# Patient Record
Sex: MALE | Race: BLACK OR AFRICAN AMERICAN | NOT HISPANIC OR LATINO
[De-identification: names, ages, dates, MRNs, and addresses within clinical notes are randomized per-mention and may not be internally consistent; named-entity substitution may affect disease eponyms.]

---

## 2017-02-03 ENCOUNTER — APPOINTMENT (OUTPATIENT)
Dept: PODIATRY | Facility: CLINIC | Age: 47
End: 2017-02-03

## 2017-02-03 PROBLEM — Z00.00 ENCOUNTER FOR PREVENTIVE HEALTH EXAMINATION: Status: ACTIVE | Noted: 2017-02-03

## 2017-03-07 ENCOUNTER — APPOINTMENT (OUTPATIENT)
Dept: PODIATRY | Facility: CLINIC | Age: 47
End: 2017-03-07

## 2017-03-07 VITALS — HEIGHT: 77 IN | WEIGHT: 225 LBS | BODY MASS INDEX: 26.57 KG/M2

## 2017-03-23 ENCOUNTER — APPOINTMENT (OUTPATIENT)
Dept: PODIATRY | Facility: CLINIC | Age: 47
End: 2017-03-23

## 2017-03-23 VITALS — HEIGHT: 77 IN | WEIGHT: 130 LBS | BODY MASS INDEX: 15.35 KG/M2

## 2017-03-23 VITALS — HEART RATE: 72 BPM | DIASTOLIC BLOOD PRESSURE: 72 MMHG | SYSTOLIC BLOOD PRESSURE: 150 MMHG

## 2017-03-29 ENCOUNTER — APPOINTMENT (OUTPATIENT)
Dept: PODIATRY | Facility: CLINIC | Age: 47
End: 2017-03-29

## 2017-03-29 VITALS
BODY MASS INDEX: 27.75 KG/M2 | HEIGHT: 77 IN | HEART RATE: 88 BPM | SYSTOLIC BLOOD PRESSURE: 140 MMHG | DIASTOLIC BLOOD PRESSURE: 70 MMHG | WEIGHT: 235 LBS

## 2017-04-27 ENCOUNTER — APPOINTMENT (OUTPATIENT)
Dept: PODIATRY | Facility: CLINIC | Age: 47
End: 2017-04-27

## 2017-04-27 VITALS
HEART RATE: 78 BPM | DIASTOLIC BLOOD PRESSURE: 80 MMHG | WEIGHT: 230 LBS | SYSTOLIC BLOOD PRESSURE: 120 MMHG | HEIGHT: 77 IN | BODY MASS INDEX: 27.16 KG/M2

## 2017-05-04 ENCOUNTER — APPOINTMENT (OUTPATIENT)
Dept: PODIATRY | Facility: CLINIC | Age: 47
End: 2017-05-04

## 2017-05-04 VITALS
DIASTOLIC BLOOD PRESSURE: 80 MMHG | HEIGHT: 77 IN | BODY MASS INDEX: 27.16 KG/M2 | HEART RATE: 78 BPM | WEIGHT: 230 LBS | SYSTOLIC BLOOD PRESSURE: 128 MMHG

## 2017-05-11 ENCOUNTER — APPOINTMENT (OUTPATIENT)
Dept: PODIATRY | Facility: CLINIC | Age: 47
End: 2017-05-11

## 2017-05-18 ENCOUNTER — APPOINTMENT (OUTPATIENT)
Dept: PODIATRY | Facility: CLINIC | Age: 47
End: 2017-05-18

## 2017-05-18 VITALS
BODY MASS INDEX: 27.75 KG/M2 | SYSTOLIC BLOOD PRESSURE: 132 MMHG | DIASTOLIC BLOOD PRESSURE: 64 MMHG | HEART RATE: 70 BPM | WEIGHT: 235 LBS | HEIGHT: 77 IN

## 2017-05-25 ENCOUNTER — APPOINTMENT (OUTPATIENT)
Dept: PODIATRY | Facility: CLINIC | Age: 47
End: 2017-05-25

## 2017-06-01 ENCOUNTER — APPOINTMENT (OUTPATIENT)
Dept: PODIATRY | Facility: CLINIC | Age: 47
End: 2017-06-01

## 2017-06-08 ENCOUNTER — APPOINTMENT (OUTPATIENT)
Dept: PODIATRY | Facility: CLINIC | Age: 47
End: 2017-06-08

## 2017-06-15 ENCOUNTER — OUTPATIENT (OUTPATIENT)
Dept: OUTPATIENT SERVICES | Facility: HOSPITAL | Age: 47
LOS: 1 days | Discharge: HOME | End: 2017-06-15

## 2017-06-15 ENCOUNTER — APPOINTMENT (OUTPATIENT)
Dept: PODIATRY | Facility: CLINIC | Age: 47
End: 2017-06-15

## 2017-06-15 VITALS
BODY MASS INDEX: 27.16 KG/M2 | HEART RATE: 80 BPM | DIASTOLIC BLOOD PRESSURE: 84 MMHG | HEIGHT: 77 IN | WEIGHT: 230 LBS | SYSTOLIC BLOOD PRESSURE: 122 MMHG

## 2017-06-15 DIAGNOSIS — K80.21 CALCULUS OF GALLBLADDER WITHOUT CHOLECYSTITIS WITH OBSTRUCTION: ICD-10-CM

## 2017-06-22 ENCOUNTER — APPOINTMENT (OUTPATIENT)
Dept: PODIATRY | Facility: CLINIC | Age: 47
End: 2017-06-22

## 2017-06-22 ENCOUNTER — OUTPATIENT (OUTPATIENT)
Dept: OUTPATIENT SERVICES | Facility: HOSPITAL | Age: 47
LOS: 1 days | Discharge: HOME | End: 2017-06-22

## 2017-06-22 DIAGNOSIS — K80.21 CALCULUS OF GALLBLADDER WITHOUT CHOLECYSTITIS WITH OBSTRUCTION: ICD-10-CM

## 2017-07-06 ENCOUNTER — OUTPATIENT (OUTPATIENT)
Dept: OUTPATIENT SERVICES | Facility: HOSPITAL | Age: 47
LOS: 1 days | Discharge: HOME | End: 2017-07-06

## 2017-07-06 ENCOUNTER — APPOINTMENT (OUTPATIENT)
Dept: PODIATRY | Facility: CLINIC | Age: 47
End: 2017-07-06

## 2017-07-06 DIAGNOSIS — K80.21 CALCULUS OF GALLBLADDER WITHOUT CHOLECYSTITIS WITH OBSTRUCTION: ICD-10-CM

## 2017-07-12 DIAGNOSIS — E11.8 TYPE 2 DIABETES MELLITUS WITH UNSPECIFIED COMPLICATIONS: ICD-10-CM

## 2017-07-12 DIAGNOSIS — M79.671 PAIN IN RIGHT FOOT: ICD-10-CM

## 2017-07-12 DIAGNOSIS — L97.519 NON-PRESSURE CHRONIC ULCER OF OTHER PART OF RIGHT FOOT WITH UNSPECIFIED SEVERITY: ICD-10-CM

## 2017-07-20 ENCOUNTER — APPOINTMENT (OUTPATIENT)
Dept: PODIATRY | Facility: CLINIC | Age: 47
End: 2017-07-20

## 2017-07-20 ENCOUNTER — OUTPATIENT (OUTPATIENT)
Dept: OUTPATIENT SERVICES | Facility: HOSPITAL | Age: 47
LOS: 1 days | Discharge: HOME | End: 2017-07-20

## 2017-07-20 DIAGNOSIS — K80.21 CALCULUS OF GALLBLADDER WITHOUT CHOLECYSTITIS WITH OBSTRUCTION: ICD-10-CM

## 2017-07-31 DIAGNOSIS — M79.671 PAIN IN RIGHT FOOT: ICD-10-CM

## 2017-07-31 DIAGNOSIS — L97.519 NON-PRESSURE CHRONIC ULCER OF OTHER PART OF RIGHT FOOT WITH UNSPECIFIED SEVERITY: ICD-10-CM

## 2017-07-31 DIAGNOSIS — E11.8 TYPE 2 DIABETES MELLITUS WITH UNSPECIFIED COMPLICATIONS: ICD-10-CM

## 2017-08-03 ENCOUNTER — APPOINTMENT (OUTPATIENT)
Dept: PODIATRY | Facility: CLINIC | Age: 47
End: 2017-08-03

## 2017-08-03 ENCOUNTER — OUTPATIENT (OUTPATIENT)
Dept: OUTPATIENT SERVICES | Facility: HOSPITAL | Age: 47
LOS: 1 days | Discharge: HOME | End: 2017-08-03

## 2017-08-03 DIAGNOSIS — K80.21 CALCULUS OF GALLBLADDER WITHOUT CHOLECYSTITIS WITH OBSTRUCTION: ICD-10-CM

## 2017-08-10 ENCOUNTER — APPOINTMENT (OUTPATIENT)
Dept: PODIATRY | Facility: CLINIC | Age: 47
End: 2017-08-10

## 2017-08-10 ENCOUNTER — OUTPATIENT (OUTPATIENT)
Dept: OUTPATIENT SERVICES | Facility: HOSPITAL | Age: 47
LOS: 1 days | Discharge: HOME | End: 2017-08-10

## 2017-08-10 DIAGNOSIS — K80.21 CALCULUS OF GALLBLADDER WITHOUT CHOLECYSTITIS WITH OBSTRUCTION: ICD-10-CM

## 2017-08-11 DIAGNOSIS — M79.671 PAIN IN RIGHT FOOT: ICD-10-CM

## 2017-08-11 DIAGNOSIS — E11.8 TYPE 2 DIABETES MELLITUS WITH UNSPECIFIED COMPLICATIONS: ICD-10-CM

## 2017-08-11 DIAGNOSIS — L97.519 NON-PRESSURE CHRONIC ULCER OF OTHER PART OF RIGHT FOOT WITH UNSPECIFIED SEVERITY: ICD-10-CM

## 2017-08-24 ENCOUNTER — OUTPATIENT (OUTPATIENT)
Dept: OUTPATIENT SERVICES | Facility: HOSPITAL | Age: 47
LOS: 1 days | Discharge: HOME | End: 2017-08-24

## 2017-08-24 ENCOUNTER — APPOINTMENT (OUTPATIENT)
Dept: PODIATRY | Facility: CLINIC | Age: 47
End: 2017-08-24

## 2017-08-24 DIAGNOSIS — K80.21 CALCULUS OF GALLBLADDER WITHOUT CHOLECYSTITIS WITH OBSTRUCTION: ICD-10-CM

## 2017-08-25 DIAGNOSIS — M79.671 PAIN IN RIGHT FOOT: ICD-10-CM

## 2017-08-25 DIAGNOSIS — E11.8 TYPE 2 DIABETES MELLITUS WITH UNSPECIFIED COMPLICATIONS: ICD-10-CM

## 2017-08-25 DIAGNOSIS — L97.519 NON-PRESSURE CHRONIC ULCER OF OTHER PART OF RIGHT FOOT WITH UNSPECIFIED SEVERITY: ICD-10-CM

## 2017-09-07 ENCOUNTER — OUTPATIENT (OUTPATIENT)
Dept: OUTPATIENT SERVICES | Facility: HOSPITAL | Age: 47
LOS: 1 days | Discharge: HOME | End: 2017-09-07

## 2017-09-07 ENCOUNTER — APPOINTMENT (OUTPATIENT)
Dept: PODIATRY | Facility: CLINIC | Age: 47
End: 2017-09-07

## 2017-09-07 DIAGNOSIS — K80.21 CALCULUS OF GALLBLADDER WITHOUT CHOLECYSTITIS WITH OBSTRUCTION: ICD-10-CM

## 2017-09-21 ENCOUNTER — OUTPATIENT (OUTPATIENT)
Dept: OUTPATIENT SERVICES | Facility: HOSPITAL | Age: 47
LOS: 1 days | Discharge: HOME | End: 2017-09-21

## 2017-09-21 ENCOUNTER — APPOINTMENT (OUTPATIENT)
Dept: PODIATRY | Facility: CLINIC | Age: 47
End: 2017-09-21

## 2017-09-21 DIAGNOSIS — K80.21 CALCULUS OF GALLBLADDER WITHOUT CHOLECYSTITIS WITH OBSTRUCTION: ICD-10-CM

## 2017-09-28 ENCOUNTER — APPOINTMENT (OUTPATIENT)
Dept: PODIATRY | Facility: CLINIC | Age: 47
End: 2017-09-28

## 2017-12-21 ENCOUNTER — APPOINTMENT (OUTPATIENT)
Dept: PODIATRY | Facility: CLINIC | Age: 47
End: 2017-12-21

## 2017-12-21 ENCOUNTER — OUTPATIENT (OUTPATIENT)
Dept: OUTPATIENT SERVICES | Facility: HOSPITAL | Age: 47
LOS: 1 days | Discharge: HOME | End: 2017-12-21

## 2017-12-21 VITALS
DIASTOLIC BLOOD PRESSURE: 80 MMHG | WEIGHT: 220 LBS | SYSTOLIC BLOOD PRESSURE: 134 MMHG | HEART RATE: 82 BPM | HEIGHT: 77 IN | BODY MASS INDEX: 25.98 KG/M2

## 2017-12-21 DIAGNOSIS — K80.21 CALCULUS OF GALLBLADDER WITHOUT CHOLECYSTITIS WITH OBSTRUCTION: ICD-10-CM

## 2017-12-28 ENCOUNTER — APPOINTMENT (OUTPATIENT)
Dept: PODIATRY | Facility: CLINIC | Age: 47
End: 2017-12-28

## 2017-12-29 ENCOUNTER — OUTPATIENT (OUTPATIENT)
Dept: OUTPATIENT SERVICES | Facility: HOSPITAL | Age: 47
LOS: 1 days | Discharge: HOME | End: 2017-12-29

## 2017-12-29 DIAGNOSIS — K80.21 CALCULUS OF GALLBLADDER WITHOUT CHOLECYSTITIS WITH OBSTRUCTION: ICD-10-CM

## 2018-04-19 ENCOUNTER — OUTPATIENT (OUTPATIENT)
Dept: OUTPATIENT SERVICES | Facility: HOSPITAL | Age: 48
LOS: 1 days | Discharge: HOME | End: 2018-04-19

## 2018-04-19 ENCOUNTER — APPOINTMENT (OUTPATIENT)
Dept: PODIATRY | Facility: CLINIC | Age: 48
End: 2018-04-19
Payer: MEDICAID

## 2018-04-19 VITALS — HEART RATE: 94 BPM | SYSTOLIC BLOOD PRESSURE: 123 MMHG | DIASTOLIC BLOOD PRESSURE: 85 MMHG

## 2018-04-19 VITALS — WEIGHT: 220 LBS | HEIGHT: 77 IN | BODY MASS INDEX: 25.98 KG/M2

## 2018-04-19 PROCEDURE — 97597 DBRDMT OPN WND 1ST 20 CM/<: CPT

## 2018-04-19 PROCEDURE — 99212 OFFICE O/P EST SF 10 MIN: CPT | Mod: 25

## 2018-04-20 RX ORDER — METFORMIN HYDROCHLORIDE 1000 MG/1
1000 TABLET, COATED ORAL
Refills: 0 | Status: COMPLETED | COMMUNITY

## 2018-04-25 ENCOUNTER — OUTPATIENT (OUTPATIENT)
Dept: OUTPATIENT SERVICES | Facility: HOSPITAL | Age: 48
LOS: 1 days | Discharge: HOME | End: 2018-04-25

## 2018-04-25 ENCOUNTER — APPOINTMENT (OUTPATIENT)
Dept: PODIATRY | Facility: CLINIC | Age: 48
End: 2018-04-25
Payer: MEDICAID

## 2018-04-25 VITALS
BODY MASS INDEX: 26.33 KG/M2 | HEART RATE: 98 BPM | SYSTOLIC BLOOD PRESSURE: 124 MMHG | DIASTOLIC BLOOD PRESSURE: 89 MMHG | WEIGHT: 223 LBS | HEIGHT: 77 IN

## 2018-04-25 DIAGNOSIS — L97.519 NON-PRESSURE CHRONIC ULCER OF OTHER PART OF RIGHT FOOT WITH UNSPECIFIED SEVERITY: ICD-10-CM

## 2018-04-25 PROCEDURE — 11042 DBRDMT SUBQ TIS 1ST 20SQCM/<: CPT

## 2018-04-30 ENCOUNTER — INPATIENT (INPATIENT)
Facility: HOSPITAL | Age: 48
LOS: 3 days | Discharge: HOME HEALTH | End: 2018-05-04
Attending: INTERNAL MEDICINE | Admitting: INTERNAL MEDICINE
Payer: MEDICAID

## 2018-04-30 VITALS
HEART RATE: 89 BPM | TEMPERATURE: 96 F | SYSTOLIC BLOOD PRESSURE: 184 MMHG | OXYGEN SATURATION: 98 % | RESPIRATION RATE: 16 BRPM | DIASTOLIC BLOOD PRESSURE: 93 MMHG

## 2018-04-30 NOTE — ED PROVIDER NOTE - CARE PLAN
Principal Discharge DX:	Diabetic foot ulcer associated with diabetes mellitus due to underlying condition  Secondary Diagnosis:	Toe dislocation, right, sequela

## 2018-04-30 NOTE — ED PROVIDER NOTE - PROGRESS NOTE DETAILS
Dr. Carl Podiatry at bedside. Dr. Carl Podiatry at bedside recommends IV antibiotics and admission. Case signed out to TAI Cowart.

## 2018-04-30 NOTE — ED PROVIDER NOTE - ATTENDING CONTRIBUTION TO CARE
Patient with history of DM II, chronic foot/toe ulceration, here for atraumatic dislocation of PIP of 2nd toe with proximal phalynx protruding through ulcer. Will treat as open fracture, podiatry, IV abx and admission.

## 2018-04-30 NOTE — ED PROVIDER NOTE - OBJECTIVE STATEMENT
46 y/o male with hx DM, Foot ulcer presents to the ED c/o "I was at work all day standing and when I went home I noticed my right 2nd toe was deformed and I think it's broken." no hx trauma/ fever/ chills/ weakness. Patient states was been under care at Podiatry clinic.

## 2018-05-01 LAB
ANION GAP SERPL CALC-SCNC: 11 MMOL/L — SIGNIFICANT CHANGE UP (ref 7–14)
ANION GAP SERPL CALC-SCNC: 11 MMOL/L — SIGNIFICANT CHANGE UP (ref 7–14)
BUN SERPL-MCNC: 19 MG/DL — SIGNIFICANT CHANGE UP (ref 10–20)
BUN SERPL-MCNC: 22 MG/DL — HIGH (ref 10–20)
CALCIUM SERPL-MCNC: 9 MG/DL — SIGNIFICANT CHANGE UP (ref 8.5–10.1)
CALCIUM SERPL-MCNC: 9.7 MG/DL — SIGNIFICANT CHANGE UP (ref 8.5–10.1)
CHLORIDE SERPL-SCNC: 101 MMOL/L — SIGNIFICANT CHANGE UP (ref 98–110)
CHLORIDE SERPL-SCNC: 99 MMOL/L — SIGNIFICANT CHANGE UP (ref 98–110)
CO2 SERPL-SCNC: 29 MMOL/L — SIGNIFICANT CHANGE UP (ref 17–32)
CO2 SERPL-SCNC: 29 MMOL/L — SIGNIFICANT CHANGE UP (ref 17–32)
CREAT SERPL-MCNC: 1 MG/DL — SIGNIFICANT CHANGE UP (ref 0.7–1.5)
CREAT SERPL-MCNC: 1.1 MG/DL — SIGNIFICANT CHANGE UP (ref 0.7–1.5)
ESTIMATED AVERAGE GLUCOSE: 192 MG/DL — HIGH (ref 68–114)
ESTIMATED AVERAGE GLUCOSE: 194 MG/DL — HIGH (ref 68–114)
GLUCOSE SERPL-MCNC: 169 MG/DL — HIGH (ref 70–99)
GLUCOSE SERPL-MCNC: 200 MG/DL — HIGH (ref 70–99)
HBA1C BLD-MCNC: 8.3 % — HIGH (ref 4–5.6)
HBA1C BLD-MCNC: 8.4 % — HIGH (ref 4–5.6)
HCT VFR BLD CALC: 39.6 % — LOW (ref 42–52)
HGB BLD-MCNC: 12.1 G/DL — LOW (ref 14–18)
INR BLD: 1.12 RATIO — SIGNIFICANT CHANGE UP (ref 0.65–1.3)
LACTATE SERPL-SCNC: 1.2 MMOL/L — SIGNIFICANT CHANGE UP (ref 0.5–2.2)
MCHC RBC-ENTMCNC: 23 PG — LOW (ref 27–31)
MCHC RBC-ENTMCNC: 30.6 G/DL — LOW (ref 32–37)
MCV RBC AUTO: 75.4 FL — LOW (ref 80–94)
NRBC # BLD: 0 /100 WBCS — SIGNIFICANT CHANGE UP (ref 0–0)
PLATELET # BLD AUTO: 238 K/UL — SIGNIFICANT CHANGE UP (ref 130–400)
POTASSIUM SERPL-MCNC: 4.1 MMOL/L — SIGNIFICANT CHANGE UP (ref 3.5–5)
POTASSIUM SERPL-MCNC: 4.2 MMOL/L — SIGNIFICANT CHANGE UP (ref 3.5–5)
POTASSIUM SERPL-SCNC: 4.1 MMOL/L — SIGNIFICANT CHANGE UP (ref 3.5–5)
POTASSIUM SERPL-SCNC: 4.2 MMOL/L — SIGNIFICANT CHANGE UP (ref 3.5–5)
PROTHROM AB SERPL-ACNC: 12.1 SEC — SIGNIFICANT CHANGE UP (ref 9.95–12.87)
RBC # BLD: 5.25 M/UL — SIGNIFICANT CHANGE UP (ref 4.7–6.1)
RBC # FLD: 12.6 % — SIGNIFICANT CHANGE UP (ref 11.5–14.5)
SODIUM SERPL-SCNC: 139 MMOL/L — SIGNIFICANT CHANGE UP (ref 135–146)
SODIUM SERPL-SCNC: 141 MMOL/L — SIGNIFICANT CHANGE UP (ref 135–146)
WBC # BLD: 6.08 K/UL — SIGNIFICANT CHANGE UP (ref 4.8–10.8)
WBC # FLD AUTO: 6.08 K/UL — SIGNIFICANT CHANGE UP (ref 4.8–10.8)

## 2018-05-01 RX ORDER — ACETAMINOPHEN 500 MG
650 TABLET ORAL EVERY 6 HOURS
Qty: 0 | Refills: 0 | Status: DISCONTINUED | OUTPATIENT
Start: 2018-05-01 | End: 2018-05-04

## 2018-05-01 RX ORDER — SODIUM CHLORIDE 9 MG/ML
3 INJECTION INTRAMUSCULAR; INTRAVENOUS; SUBCUTANEOUS EVERY 8 HOURS
Qty: 0 | Refills: 0 | Status: DISCONTINUED | OUTPATIENT
Start: 2018-05-01 | End: 2018-05-04

## 2018-05-01 RX ORDER — VANCOMYCIN HCL 1 G
1500 VIAL (EA) INTRAVENOUS EVERY 8 HOURS
Qty: 0 | Refills: 0 | Status: DISCONTINUED | OUTPATIENT
Start: 2018-05-01 | End: 2018-05-03

## 2018-05-01 RX ORDER — VANCOMYCIN HCL 1 G
1000 VIAL (EA) INTRAVENOUS ONCE
Qty: 0 | Refills: 0 | Status: COMPLETED | OUTPATIENT
Start: 2018-05-01 | End: 2018-05-01

## 2018-05-01 RX ORDER — LANOLIN ALCOHOL/MO/W.PET/CERES
5 CREAM (GRAM) TOPICAL AT BEDTIME
Qty: 0 | Refills: 0 | Status: DISCONTINUED | OUTPATIENT
Start: 2018-05-01 | End: 2018-05-04

## 2018-05-01 RX ORDER — ENOXAPARIN SODIUM 100 MG/ML
40 INJECTION SUBCUTANEOUS DAILY
Qty: 0 | Refills: 0 | Status: DISCONTINUED | OUTPATIENT
Start: 2018-05-01 | End: 2018-05-04

## 2018-05-01 RX ORDER — INSULIN LISPRO 100/ML
10 VIAL (ML) SUBCUTANEOUS ONCE
Qty: 0 | Refills: 0 | Status: COMPLETED | OUTPATIENT
Start: 2018-05-01 | End: 2018-05-01

## 2018-05-01 RX ADMIN — Medication 10 UNIT(S): at 22:19

## 2018-05-01 RX ADMIN — Medication 300 MILLIGRAM(S): at 22:19

## 2018-05-01 RX ADMIN — Medication 650 MILLIGRAM(S): at 22:19

## 2018-05-01 RX ADMIN — Medication 300 MILLIGRAM(S): at 06:42

## 2018-05-01 RX ADMIN — Medication 5 MILLIGRAM(S): at 22:19

## 2018-05-01 RX ADMIN — SODIUM CHLORIDE 3 MILLILITER(S): 9 INJECTION INTRAMUSCULAR; INTRAVENOUS; SUBCUTANEOUS at 22:20

## 2018-05-01 RX ADMIN — Medication 650 MILLIGRAM(S): at 22:49

## 2018-05-01 RX ADMIN — SODIUM CHLORIDE 3 MILLILITER(S): 9 INJECTION INTRAMUSCULAR; INTRAVENOUS; SUBCUTANEOUS at 05:39

## 2018-05-01 RX ADMIN — SODIUM CHLORIDE 3 MILLILITER(S): 9 INJECTION INTRAMUSCULAR; INTRAVENOUS; SUBCUTANEOUS at 15:37

## 2018-05-01 RX ADMIN — Medication 300 MILLIGRAM(S): at 18:41

## 2018-05-01 RX ADMIN — Medication 250 MILLIGRAM(S): at 00:51

## 2018-05-01 NOTE — CONSULT NOTE ADULT - SUBJECTIVE AND OBJECTIVE BOX
PROGRESS NOTE   Patient is a 47y old  Male who presents with a chief complaint of septic arthritis. osteomyelitis (01 May 2018 01:55). Podiatry was consulted for left 2nd digit ulceration.      HPI:  47 M with PMH of DM presents with 2 week history of open ulcer over medial aspect of right second toe, he has been following with podiatry every week and was seen last thursday in the clinic and dressing was applied.   he reports that today he over exerted and ran also, works as , he noticed that his toe was deformed and saw bone protruding from ulcer, came to ED, got xray which showed -There is erosion across the second PIP joint consistent with  septic arthritis and adjacent osteomyelitis. There is second toe swelling with overlap between the first and second toe soft tissues which limits  assessment.     denies fever/n/v/c/d/chills/CP/SOB or any other symptoms,  reports chronic numbness in base of feet and toes.    non compliant with metformin, has not taken in many days, reports that he watches his diet. (01 May 2018 01:55)      Vital Signs Last 24 Hrs  T(C): 36.1 (01 May 2018 07:40), Max: 36.1 (01 May 2018 01:48)  T(F): 97 (01 May 2018 07:40), Max: 97 (01 May 2018 01:48)  HR: 88 (01 May 2018 07:40) (81 - 89)  BP: 160/105 (01 May 2018 07:40) (135/92 - 184/93)  BP(mean): --  RR: 18 (01 May 2018 07:40) (16 - 18)  SpO2: 99% (01 May 2018 07:40) (98% - 99%)                          12.1   6.08  )-----------( 238      ( 01 May 2018 00:21 )             39.6               05-01    141  |  101  |  22<H>  ----------------------------<  200<H>  4.1   |  29  |  1.1    Ca    9.7      01 May 2018 00:21        PHYSICAL EXAM  GEN: MAUREEN CASTAÑEDA is a pleasant well-nourished, well developed 47y Male in no acute distress, alert awake, and oriented to person, place and time.         LE Focused:      Vasc:   - DP/PT pulses 1/4 b/l  - CFT < 3 sec b/l  - skin temp warm to warm, proximal to distal b/l    Neuro:  - gross sensation diminished b/l    Derm:  - left lateral 2nd digit ulceration - malodor, - purulence, - drainage    MSK:  - muscular strength 4/5 in all quadrants b/l    Assessment:  - left lateral 2nd digit ulceration/dislocation    Plan:  - pt seen/evaluated @ bedside  - xray reviewed and showed displacement of medial and distal phalanx of left 2nd digit prior to reduction in the ED   - dressed w/betadine/DSD  - Consult ID  - continue IV abx and LWC  - findings discussed w/attending   - podiatry will f/u

## 2018-05-01 NOTE — H&P ADULT - HISTORY OF PRESENT ILLNESS
47 M with PMH of DM presents with 2 week history of open ulcer over medial aspect of right second toe, he has been following with podiatry every week and was seen last thursday in the clinic and dressing was applied.   he reports that today he over exerted and ran also, works as , he noticed that his toe was deformed and saw bone protruding from ulcer, came to ED, got xray which showed -There is erosion across the second PIP joint consistent with  septic arthritis and adjacent osteomyelitis. There is second toe swelling with overlap between the first and second toe soft tissues which limits  assessment.     denies fever/n/v/c/d/chills/CP/SOB or any other symptoms,  reports chronic numbness in base of feet and toes.    non compliant with metformin, has not taken in many days, reports that he watches his diet. 47 M with PMH of DM presents with 2 week history of open ulcer over medial aspect of right second toe, he has been following with podiatry every week and was seen last thursday in the clinic and dressing was applied.   he reports that today he over exerted and ran also, works as , he noticed that his toe was deformed and saw bone protruding from ulcer, came to ED, got xray which showed -There is erosion across the second PIP joint consistent with septic arthritis and adjacent osteomyelitis. There is second toe swelling with overlap between the first and second toe soft tissues which limits  assessment.     denies fever/n/v/c/d/chills/CP/SOB or any other symptoms,  reports chronic numbness in base of feet and toes.    non compliant with metformin, has not taken in many days, reports that he watches his diet.

## 2018-05-01 NOTE — H&P ADULT - ATTENDING COMMENTS
Patient seen and examined at the bed side. not in distress.   Agree with above note.   Necessary correction made   Podiatry evaluation and follow ups  Infectious disease evaluation. to adjust the antibiotics

## 2018-05-01 NOTE — H&P ADULT - ASSESSMENT
47 M with h/o DM admitted for osteomyelitis/septic arthritis    1- OM right second toe  patient had dislocation of proximal phalynx of 2nd R toe which was repositioned in ED, and betadiene dressing was applied  received vancomycin in ED  c/w vanco for MRSA coverage and added levoquin for g-ve, pseudomonas and MSSA 47 M with h/o DM admitted for osteomyelitis/septic arthritis    1- OM right second toe  patient had dislocation of proximal phalynx of 2nd R toe which was repositioned in ED, and betadiene dressing was applied  received vancomycin 1 gm in ED  c/w vanco 1500 q12 for MRSA coverage and added levoquin for g-ve, pseudomonas and MSSA  will need PICC line for long term abx  consider ID eval after discussing with PMD  will place podiatry consult    2- DM2- get hba1c  fs ac hs  insulin as needed  hold meformin    3- DVT ppx- lovenox 40 q24    ambulate as tolerated   full code 47 M with h/o DM admitted for osteomyelitis/septic arthritis    1- OM right second toe  patient had dislocation of proximal phalynx of 2nd R toe which was repositioned in ED, and betadiene dressing was applied  received vancomycin 1 gm in ED  c/w vanco 1500 q8h for MRSA coverage and added levoquin for g-ve, pseudomonas and MSSA  will need PICC line for long term abx  consider ID eval after discussing with PMD  will place podiatry consult    2- DM2- get hba1c  fs ac hs  insulin as needed  hold meformin    3- DVT ppx- lovenox 40 q24    ambulate as tolerated   full code

## 2018-05-01 NOTE — H&P ADULT - NSHPPHYSICALEXAM_GEN_ALL_CORE
PHYSICAL EXAM:  GENERAL: NAD, speaks in full sentences, no signs of respiratory distress  HEAD:  Atraumatic, Normocephalic  EYES: conjunctiva and sclera clear  NECK: Supple, No JVD  CHEST/LUNG: Clear to auscultation bilaterally; No wheeze; No crackles; No accessory muscles used  HEART: Regular rate and rhythm; No murmurs;   ABDOMEN: Soft, Nontender, Nondistended; Bowel sounds present; No guarding  EXTREMITIES:  2+ Peripheral Pulses, No cyanosis or edema    right foot- second toe- medial side 2 cm ulcer with visible bone , no pus,   dry scaly skin over toes and base of foot, hyperpigmented skin over dorsum of foot    left foot- dry scaly skin, old scar from ulcer over lateral part of first toe    PSYCH: AAOx3  NEUROLOGY: non-focal PHYSICAL EXAM:  GENERAL: NAD, speaks in full sentences, no signs of respiratory distress  HEAD:  Atraumatic, Normocephalic  EYES: conjunctiva and sclera clear  NECK / HEENT: Supple, No JVD  CHEST/LUNG: Clear to auscultation bilaterally; No wheeze; No crackles; No accessory muscles used  HEART / CVS : Regular rate and rhythm; No murmurs;   ABDOMEN: Soft, Nontender, Nondistended; Bowel sounds present; No guarding  EXTREMITIES:  2+ Peripheral Pulses, No cyanosis or edema    right foot- second toe- medial side 2 cm ulcer with visible bone , no pus,   dry scaly skin over toes and base of foot, hyperpigmented skin over dorsum of foot    left foot- dry scaly skin, old scar from ulcer over lateral part of first toe    PSYCH: AAOx3  NEUROLOGY: non-focal

## 2018-05-01 NOTE — ED ADULT NURSE NOTE - OBJECTIVE STATEMENT
Pt reports right 2nd toe pain. Pt presents with ulceration to medial aspect of right second toe with obvious deformity. Denies trauma or fever.

## 2018-05-02 DIAGNOSIS — E08.621 DIABETES MELLITUS DUE TO UNDERLYING CONDITION WITH FOOT ULCER: ICD-10-CM

## 2018-05-02 PROCEDURE — 93925 LOWER EXTREMITY STUDY: CPT | Mod: 26

## 2018-05-02 PROCEDURE — 99222 1ST HOSP IP/OBS MODERATE 55: CPT

## 2018-05-02 RX ORDER — INSULIN GLARGINE 100 [IU]/ML
8 INJECTION, SOLUTION SUBCUTANEOUS EVERY MORNING
Qty: 0 | Refills: 0 | Status: DISCONTINUED | OUTPATIENT
Start: 2018-05-02 | End: 2018-05-04

## 2018-05-02 RX ORDER — SODIUM CHLORIDE 9 MG/ML
1000 INJECTION, SOLUTION INTRAVENOUS
Qty: 0 | Refills: 0 | Status: DISCONTINUED | OUTPATIENT
Start: 2018-05-02 | End: 2018-05-04

## 2018-05-02 RX ORDER — DEXTROSE 50 % IN WATER 50 %
12.5 SYRINGE (ML) INTRAVENOUS ONCE
Qty: 0 | Refills: 0 | Status: DISCONTINUED | OUTPATIENT
Start: 2018-05-02 | End: 2018-05-04

## 2018-05-02 RX ORDER — DEXTROSE 50 % IN WATER 50 %
25 SYRINGE (ML) INTRAVENOUS ONCE
Qty: 0 | Refills: 0 | Status: DISCONTINUED | OUTPATIENT
Start: 2018-05-02 | End: 2018-05-04

## 2018-05-02 RX ORDER — INSULIN LISPRO 100/ML
4 VIAL (ML) SUBCUTANEOUS
Qty: 0 | Refills: 0 | Status: DISCONTINUED | OUTPATIENT
Start: 2018-05-02 | End: 2018-05-04

## 2018-05-02 RX ORDER — DEXTROSE 50 % IN WATER 50 %
1 SYRINGE (ML) INTRAVENOUS ONCE
Qty: 0 | Refills: 0 | Status: DISCONTINUED | OUTPATIENT
Start: 2018-05-02 | End: 2018-05-04

## 2018-05-02 RX ORDER — INSULIN LISPRO 100/ML
VIAL (ML) SUBCUTANEOUS
Qty: 0 | Refills: 0 | Status: DISCONTINUED | OUTPATIENT
Start: 2018-05-02 | End: 2018-05-04

## 2018-05-02 RX ORDER — GLUCAGON INJECTION, SOLUTION 0.5 MG/.1ML
1 INJECTION, SOLUTION SUBCUTANEOUS ONCE
Qty: 0 | Refills: 0 | Status: DISCONTINUED | OUTPATIENT
Start: 2018-05-02 | End: 2018-05-04

## 2018-05-02 RX ADMIN — Medication 2: at 12:28

## 2018-05-02 RX ADMIN — SODIUM CHLORIDE 3 MILLILITER(S): 9 INJECTION INTRAMUSCULAR; INTRAVENOUS; SUBCUTANEOUS at 13:01

## 2018-05-02 RX ADMIN — SODIUM CHLORIDE 3 MILLILITER(S): 9 INJECTION INTRAMUSCULAR; INTRAVENOUS; SUBCUTANEOUS at 06:17

## 2018-05-02 RX ADMIN — SODIUM CHLORIDE 3 MILLILITER(S): 9 INJECTION INTRAMUSCULAR; INTRAVENOUS; SUBCUTANEOUS at 21:44

## 2018-05-02 RX ADMIN — Medication 4 UNIT(S): at 12:28

## 2018-05-02 RX ADMIN — INSULIN GLARGINE 8 UNIT(S): 100 INJECTION, SOLUTION SUBCUTANEOUS at 12:28

## 2018-05-02 RX ADMIN — Medication 300 MILLIGRAM(S): at 13:01

## 2018-05-02 RX ADMIN — Medication 4 UNIT(S): at 17:35

## 2018-05-02 RX ADMIN — Medication 300 MILLIGRAM(S): at 21:51

## 2018-05-02 RX ADMIN — Medication 300 MILLIGRAM(S): at 06:36

## 2018-05-02 NOTE — PROGRESS NOTE ADULT - PROBLEM SELECTOR PLAN 1
Patient examined and evaluated.   Patient dressed with betadine/DSD/Kerlix  MRI ordered  A Duplex Ordered  Recommend vascular and ID consults  Patient will nee medical clearance with stratification for possible OR procedure pending results of MRI  Will continue to follow Patient examined and evaluated.   Patient dressed with betadine/DSD/Kerlix  MRI ordered  A Duplex Ordered  Recommend vascular and ID consults  Patient will need medical clearance with stratification for possible OR procedure pending results of MRI  Will continue to follow

## 2018-05-02 NOTE — PROGRESS NOTE ADULT - ATTENDING COMMENTS
Pt is well known to Podiatry. I d/w patient the possible need for surgery. Please obtain MRI to evaluate proximal extension of osteomyelitis.     Thank you for allowing me to participate in your patient care.

## 2018-05-03 ENCOUNTER — APPOINTMENT (OUTPATIENT)
Dept: PODIATRY | Facility: CLINIC | Age: 48
End: 2018-05-03

## 2018-05-03 ENCOUNTER — RESULT REVIEW (OUTPATIENT)
Age: 48
End: 2018-05-03

## 2018-05-03 LAB
24R-OH-CALCIDIOL SERPL-MCNC: 15 NG/ML — LOW (ref 30–80)
ANION GAP SERPL CALC-SCNC: 12 MMOL/L — SIGNIFICANT CHANGE UP (ref 7–14)
APPEARANCE UR: CLEAR — SIGNIFICANT CHANGE UP
BILIRUB UR-MCNC: NEGATIVE — SIGNIFICANT CHANGE UP
BUN SERPL-MCNC: 14 MG/DL — SIGNIFICANT CHANGE UP (ref 10–20)
CALCIUM SERPL-MCNC: 8.4 MG/DL — LOW (ref 8.5–10.1)
CHLORIDE SERPL-SCNC: 105 MMOL/L — SIGNIFICANT CHANGE UP (ref 98–110)
CO2 SERPL-SCNC: 24 MMOL/L — SIGNIFICANT CHANGE UP (ref 17–32)
COLOR SPEC: YELLOW — SIGNIFICANT CHANGE UP
CREAT SERPL-MCNC: 0.9 MG/DL — SIGNIFICANT CHANGE UP (ref 0.7–1.5)
DIFF PNL FLD: NEGATIVE — SIGNIFICANT CHANGE UP
EPI CELLS # UR: (no result) /HPF
ESTIMATED AVERAGE GLUCOSE: 186 MG/DL — HIGH (ref 68–114)
GLUCOSE SERPL-MCNC: 157 MG/DL — HIGH (ref 70–99)
GLUCOSE UR QL: NEGATIVE MG/DL — SIGNIFICANT CHANGE UP
HBA1C BLD-MCNC: 8.1 % — HIGH (ref 4–5.6)
HCT VFR BLD CALC: 37.6 % — LOW (ref 42–52)
HGB BLD-MCNC: 11.4 G/DL — LOW (ref 14–18)
KETONES UR-MCNC: NEGATIVE — SIGNIFICANT CHANGE UP
LEUKOCYTE ESTERASE UR-ACNC: NEGATIVE — SIGNIFICANT CHANGE UP
MCHC RBC-ENTMCNC: 22.8 PG — LOW (ref 27–31)
MCHC RBC-ENTMCNC: 30.3 G/DL — LOW (ref 32–37)
MCV RBC AUTO: 75.4 FL — LOW (ref 80–94)
NITRITE UR-MCNC: NEGATIVE — SIGNIFICANT CHANGE UP
NRBC # BLD: 0 /100 WBCS — SIGNIFICANT CHANGE UP (ref 0–0)
PH UR: 5.5 — SIGNIFICANT CHANGE UP (ref 5–8)
PLATELET # BLD AUTO: 215 K/UL — SIGNIFICANT CHANGE UP (ref 130–400)
POTASSIUM SERPL-MCNC: 4.3 MMOL/L — SIGNIFICANT CHANGE UP (ref 3.5–5)
POTASSIUM SERPL-SCNC: 4.3 MMOL/L — SIGNIFICANT CHANGE UP (ref 3.5–5)
PROT UR-MCNC: 30 MG/DL
RBC # BLD: 4.99 M/UL — SIGNIFICANT CHANGE UP (ref 4.7–6.1)
RBC # FLD: 12.5 % — SIGNIFICANT CHANGE UP (ref 11.5–14.5)
SODIUM SERPL-SCNC: 141 MMOL/L — SIGNIFICANT CHANGE UP (ref 135–146)
SP GR SPEC: 1.02 — SIGNIFICANT CHANGE UP (ref 1.01–1.03)
UROBILINOGEN FLD QL: 1 MG/DL (ref 0.2–0.2)
VIT D25+D1,25 OH+D1,25 PNL SERPL-MCNC: 33.8 PG/ML — SIGNIFICANT CHANGE UP (ref 19.9–79.3)
WBC # BLD: 5.47 K/UL — SIGNIFICANT CHANGE UP (ref 4.8–10.8)
WBC # FLD AUTO: 5.47 K/UL — SIGNIFICANT CHANGE UP (ref 4.8–10.8)

## 2018-05-03 PROCEDURE — 20240 BONE BIOPSY OPEN SUPERFICIAL: CPT | Mod: 59

## 2018-05-03 PROCEDURE — 28124 PARTIAL REMOVAL OF TOE: CPT | Mod: T6

## 2018-05-03 RX ORDER — CEFOTETAN DISODIUM 1 G
1 VIAL (EA) INJECTION ONCE
Qty: 0 | Refills: 0 | Status: COMPLETED | OUTPATIENT
Start: 2018-05-03 | End: 2018-05-03

## 2018-05-03 RX ORDER — CEFOTETAN DISODIUM 1 G
1 VIAL (EA) INJECTION EVERY 12 HOURS
Qty: 0 | Refills: 0 | Status: DISCONTINUED | OUTPATIENT
Start: 2018-05-03 | End: 2018-05-03

## 2018-05-03 RX ORDER — CEFOTETAN DISODIUM 1 G
VIAL (EA) INJECTION
Qty: 0 | Refills: 0 | Status: DISCONTINUED | OUTPATIENT
Start: 2018-05-03 | End: 2018-05-03

## 2018-05-03 RX ORDER — CEFOTETAN DISODIUM 1 G
2 VIAL (EA) INJECTION EVERY 12 HOURS
Qty: 0 | Refills: 0 | Status: DISCONTINUED | OUTPATIENT
Start: 2018-05-03 | End: 2018-05-04

## 2018-05-03 RX ADMIN — INSULIN GLARGINE 8 UNIT(S): 100 INJECTION, SOLUTION SUBCUTANEOUS at 09:48

## 2018-05-03 RX ADMIN — Medication 1: at 11:54

## 2018-05-03 RX ADMIN — Medication 4 UNIT(S): at 11:54

## 2018-05-03 RX ADMIN — SODIUM CHLORIDE 3 MILLILITER(S): 9 INJECTION INTRAMUSCULAR; INTRAVENOUS; SUBCUTANEOUS at 05:28

## 2018-05-03 RX ADMIN — Medication 100 GRAM(S): at 17:27

## 2018-05-03 RX ADMIN — SODIUM CHLORIDE 3 MILLILITER(S): 9 INJECTION INTRAMUSCULAR; INTRAVENOUS; SUBCUTANEOUS at 11:01

## 2018-05-03 RX ADMIN — Medication 4 UNIT(S): at 17:26

## 2018-05-03 RX ADMIN — SODIUM CHLORIDE 3 MILLILITER(S): 9 INJECTION INTRAMUSCULAR; INTRAVENOUS; SUBCUTANEOUS at 22:26

## 2018-05-03 RX ADMIN — Medication 4 UNIT(S): at 09:47

## 2018-05-03 RX ADMIN — Medication 100 GRAM(S): at 10:20

## 2018-05-03 RX ADMIN — Medication 2: at 17:26

## 2018-05-03 RX ADMIN — Medication 300 MILLIGRAM(S): at 05:21

## 2018-05-03 NOTE — PROCEDURE NOTE - NSPROCDETAILS_GEN_ALL_CORE
supine position/location identified, draped/prepped, sterile technique used/sterile technique, catheter placed/sterile dressing applied
incision left open, packing placed

## 2018-05-03 NOTE — PROCEDURE NOTE - PROCEDURE
<<-----Click on this checkbox to enter Procedure Debridement, bone, foot  05/03/2018  Excisional debridement of Soft tissue and bone right foot  Active  [id number]

## 2018-05-03 NOTE — CONSULT NOTE ADULT - ASSESSMENT
IMPRESSION  Pt with DM, septic arthritis & osteomyelitis    Right foot xray:  There is erosion across the second PIP joint consistent with   septic arthritis and adjacent osteomyelitis. There is second toe swelling   with overlap between the first and second toe soft tissues which limits   assessment.   Otherwise there is hallux valgus deformity with bipartite enlarged tibial   sesamoid and moderate degenerative changes. Chronic cortical thickening   present along the second third and fourth metatarsals.     On: levoFLOXacin 750 mg IV Intermittent every 24 hours  vancomycin 1500 mg IV Intermittent every 8 hours    NKDA; No hx MRSA      SUGGESTIONs  ESR, CRP  Switch to IMPRESSION  Pt with DM, septic arthritis & osteomyelitis    Right foot xray:  There is erosion across the second PIP joint consistent with   septic arthritis and adjacent osteomyelitis. There is second toe swelling   with overlap between the first and second toe soft tissues which limits   assessment.   Otherwise there is hallux valgus deformity with bipartite enlarged tibial   sesamoid and moderate degenerative changes. Chronic cortical thickening   present along the second third and fourth metatarsals.     On: levoFLOXacin 750 mg IV Intermittent every 24 hours  vancomycin 1500 mg IV Intermittent every 8 hours    NKDA; No hx MRSA      SUGGESTIONs  ESR, CRP  Wound C&S  Vascular W/U  Await MRI  Podiatry F/U  D/C Vancomycin  Start Cefotetan 2gm q12h IVPB IMPRESSION  Pt with DM, septic arthritis & osteomyelitis    Right foot xray:  There is erosion across the second PIP joint consistent with   septic arthritis and adjacent osteomyelitis. There is second toe swelling   with overlap between the first and second toe soft tissues which limits   assessment.   Otherwise there is hallux valgus deformity with bipartite enlarged tibial   sesamoid and moderate degenerative changes. Chronic cortical thickening   present along the second third and fourth metatarsals.     On: levoFLOXacin 750 mg IV Intermittent every 24 hours  vancomycin 1500 mg IV Intermittent every 8 hours  NKDA; No hx MRSA    SUGGESTIONs  ESR, CRP  Wound C&S  Vascular W/U  Await MRI  Podiatry F/U  D/C Vancomycin  Start Cefotetan 2gm q12h IVPB   Ultimate PICC line

## 2018-05-03 NOTE — PROCEDURE NOTE - NSEQUIPUSED_SKIN_A_CORE
forceps/antiseptic cleaning solution/suture scissors/gloves/gauze pads/normal saline solution/povidone-iodine pads/adhesive tape

## 2018-05-03 NOTE — CONSULT NOTE ADULT - SUBJECTIVE AND OBJECTIVE BOX
MAUREEN CASTAÑEDA 47yMalePatient is a 47y old  Male who presents with a chief complaint of septic arthritis. osteomyelitis  Chief complaint - Pain foot (01 May 2018 01:55)      Patient has history of:  No Known Allergies      DIABETIC FOOT ULCER ASSOCIATED WITH DIABETES MELLITUS;TOE DISLOCATION,RIGH  ^R FOOT TOE PAIN  No h/o HF  No pertinent family history in first degree relatives  Handoff  MEWS Score  Diabetes  Diabetic foot ulcer associated with diabetes mellitus due to underlying condition  Diabetic foot ulcer associated with diabetes mellitus due to underlying condition  No significant past surgical history  R FOOT TOE PAIN  Toe dislocation, right, sequela        Patient treated with:  levoFLOXacin IVPB 750 milliGRAM(s) IV Intermittent every 24 hours  vancomycin  IVPB 1500 milliGRAM(s) IV Intermittent every 8 hours        PHYSICAL EXAM  T(F): 97.2 (18 @ 05:50), Max: 98.9 (18 @ 20:58)  HR: 82 (18 @ 05:50) (82 - 95)  BP: 132/88 (18 @ 05:50) (132/88 - 149/86)  RR: 17 (18 @ 05:50) (17 - 19)  SpO2: --  Daily     Daily   HEENT: normal, no nuchal rigidity  Cor: RSR Nl S1 S2  Lungs: clear  Decreased breath sounds at bases    Abdomen: Nontender, Nl BS,     Ext: No clubbing,cyanosis or edema    LAB & RADIOLOGIC RESULTS:            139  |  99  |  19  ----------------------------<  169<H>  4.2   |  29  |  1.0        Urinalysis Basic - ( 02 May 2018 22:50 )    Color: Yellow / Appearance: Clear / S.025 / pH: x  Gluc: x / Ketone: Negative  / Bili: Negative / Urobili: 1.0 mg/dL   Blood: x / Protein: 30 mg/dL / Nitrite: Negative   Leuk Esterase: Negative / RBC: x / WBC x   Sq Epi: x / Non Sq Epi: Occasional /HPF / Bacteria: x          Culture - Blood (collected 01 May 2018 00:21)  Source: .Blood Blood  Preliminary Report (02 May 2018 06:01):    No growth to date.

## 2018-05-04 ENCOUNTER — TRANSCRIPTION ENCOUNTER (OUTPATIENT)
Age: 48
End: 2018-05-04

## 2018-05-04 VITALS
RESPIRATION RATE: 19 BRPM | TEMPERATURE: 96 F | DIASTOLIC BLOOD PRESSURE: 86 MMHG | HEART RATE: 83 BPM | SYSTOLIC BLOOD PRESSURE: 151 MMHG

## 2018-05-04 LAB
GRAM STN FLD: SIGNIFICANT CHANGE UP
SPECIMEN SOURCE: SIGNIFICANT CHANGE UP

## 2018-05-04 PROCEDURE — 13160 SEC CLSR SURG WND/DEHSN XTN: CPT | Mod: 58

## 2018-05-04 RX ORDER — CEFOTETAN DISODIUM 1 G
2 VIAL (EA) INJECTION
Qty: 0 | Refills: 0 | DISCHARGE
Start: 2018-05-04

## 2018-05-04 RX ADMIN — Medication 4 UNIT(S): at 08:46

## 2018-05-04 RX ADMIN — Medication 1: at 12:23

## 2018-05-04 RX ADMIN — Medication 4 UNIT(S): at 12:23

## 2018-05-04 RX ADMIN — Medication 100 GRAM(S): at 06:30

## 2018-05-04 RX ADMIN — INSULIN GLARGINE 8 UNIT(S): 100 INJECTION, SOLUTION SUBCUTANEOUS at 08:45

## 2018-05-04 RX ADMIN — SODIUM CHLORIDE 3 MILLILITER(S): 9 INJECTION INTRAMUSCULAR; INTRAVENOUS; SUBCUTANEOUS at 13:35

## 2018-05-04 RX ADMIN — SODIUM CHLORIDE 3 MILLILITER(S): 9 INJECTION INTRAMUSCULAR; INTRAVENOUS; SUBCUTANEOUS at 06:30

## 2018-05-04 NOTE — DISCHARGE NOTE ADULT - MEDICATION SUMMARY - MEDICATIONS TO TAKE
I will START or STAY ON the medications listed below when I get home from the hospital:    metFORMIN  -- Non-compliant  -- Indication: For Diabetes    cefoTEtan 2 g intravenous injection  -- 2 gram(s) intravenous every 12 hours  -- Indication: For Septic arthritis

## 2018-05-04 NOTE — PROGRESS NOTE ADULT - SUBJECTIVE AND OBJECTIVE BOX
MAUREEN CASTAÑEDA  47y  Male      Patient is a 47y old  Male who presents with a chief complaint of   foot pain. (01 May 2018 01:55)      INTERVAL HPI/OVERNIGHT EVENTS:      ******************************* REVIEW OF SYSTEMS:**********************************************      All other review of systems negative    *********************** VITALS ******************************************    T(F): 97.7 (05-02-18 @ 12:33)  HR: 95 (05-02-18 @ 12:33) (73 - 95)  BP: 149/86 (05-02-18 @ 12:33) (123/69 - 149/86)  RR: 19 (05-02-18 @ 12:33) (18 - 19)  SpO2: --            ******************************** PHYSICAL EXAM:**************************************************  GENERAL: NAD    PSYCH:   HEENT:     NERVOUS SYSTEM:  Alert & Oriented X3, MS  5/5 B/L  UE and LE ; Sensory intact    PULMONARY: SOTO, CTA    CARDIOVASCULAR: S1S2 RRR    GI: Soft, NT, ND; BS present.    EXTREMITIES: right 2nd toe wound with dressing.    LYMPH: No lymphadenopathy noted    SKIN: No rashes or lesions    ******************************************************************************************    Consultant(s) Notes Reviewed:  [x ] YES  [ ] NO    Discussed with Consultants/Other Providers [ x] YES     **************************** LABS *******************************************************                          12.1   6.08  )-----------( 238      ( 01 May 2018 00:21 )             39.6     05-01    139  |  99  |  19  ----------------------------<  169<H>  4.2   |  29  |  1.0    Ca    9.0      01 May 2018 08:41          PT/INR - ( 01 May 2018 00:21 )   PT: 12.10 sec;   INR: 1.12 ratio           Lactate Trend  05-01 @ 00:21 Lactate:1.2         CAPILLARY BLOOD GLUCOSE  126 (02 May 2018 16:58)              **************************Active Medications *******************************************  No Known Allergies      acetaminophen   Tablet. 650 milliGRAM(s) Oral every 6 hours PRN  dextrose 5%. 1000 milliLiter(s) IV Continuous <Continuous>  dextrose 50% Injectable 12.5 Gram(s) IV Push once  dextrose 50% Injectable 25 Gram(s) IV Push once  dextrose 50% Injectable 25 Gram(s) IV Push once  dextrose Gel 1 Dose(s) Oral once PRN  enoxaparin Injectable 40 milliGRAM(s) SubCutaneous daily  glucagon  Injectable 1 milliGRAM(s) IntraMuscular once PRN  insulin glargine Injectable (LANTUS) 8 Unit(s) SubCutaneous every morning  insulin lispro (HumaLOG) corrective regimen sliding scale   SubCutaneous three times a day before meals  insulin lispro Injectable (HumaLOG) 4 Unit(s) SubCutaneous three times a day before meals  levoFLOXacin IVPB 750 milliGRAM(s) IV Intermittent every 24 hours  melatonin 5 milliGRAM(s) Oral at bedtime PRN  sodium chloride 0.9% lock flush 3 milliLiter(s) IV Push every 8 hours  vancomycin  IVPB 1500 milliGRAM(s) IV Intermittent every 8 hours      ***************************************************  RADIOLOGY & ADDITIONAL TESTS:    Imaging Personally Reviewed:  [ ] YES  [ ] NO    HEALTH ISSUES - PROBLEM Dx:  Diabetic foot ulcer associated with diabetes mellitus due to underlying condition: Diabetic foot ulcer associated with diabetes mellitus due to underlying condition
CASTAÑEDA MAUREEN  47y  Male      Patient is a 47y old  Male who presents with a chief complaint of septic arthritis. osteomyelitis  Chief complaint - Pain foot (01 May 2018 01:55)      INTERVAL HPI/OVERNIGHT EVENTS:      ******************************* REVIEW OF SYSTEMS:**********************************************      All other review of systems negative    *********************** VITALS ******************************************    T(F): 96.9 (18 @ 05:54)  HR: 75 (18 @ 05:54) (75 - 86)  BP: 156/85 (18 @ 05:54) (122/75 - 156/85)  RR: 17 (18 @ 05:54) (17 - 20)  SpO2: 100% (18 @ 21:18) (100% - 100%)    18 @ 07:01  -  18 @ 07:00  --------------------------------------------------------  IN: 0 mL / OUT: 900 mL / NET: -900 mL            18 @ 07:01  -  18 @ 07:00  --------------------------------------------------------  IN: 0 mL / OUT: 900 mL / NET: -900 mL        ******************************** PHYSICAL EXAM:**************************************************  GENERAL: NAD    PSYCH:   HEENT:     NERVOUS SYSTEM:  Alert & Oriented X3, MS  5/5 B/L  UE and LE ; Sensory intact    PULMONARY: SOTO, CTA    CARDIOVASCULAR: S1S2 RRR    GI: Soft, NT, ND; BS present.    EXTREMITIES:  Right foot wound with dressing.    LYMPH: No lymphadenopathy noted    SKIN: No rashes or lesions    ******************************************************************************************    Consultant(s) Notes Reviewed:  [x ] YES  [ ] NO    Discussed with Consultants/Other Providers [ x] YES     **************************** LABS *******************************************************                          11.4   5.47  )-----------( 215      ( 03 May 2018 05:59 )             37.6     -    141  |  105  |  14  ----------------------------<  157<H>  4.3   |  24  |  0.9    Ca    8.4<L>      03 May 2018 05:59        Urinalysis Basic - ( 02 May 2018 22:50 )    Color: Yellow / Appearance: Clear / S.025 / pH: x  Gluc: x / Ketone: Negative  / Bili: Negative / Urobili: 1.0 mg/dL   Blood: x / Protein: 30 mg/dL / Nitrite: Negative   Leuk Esterase: Negative / RBC: x / WBC x   Sq Epi: x / Non Sq Epi: Occasional /HPF / Bacteria: x        Lactate Trend   @ 00:21 Lactate:1.2         CAPILLARY BLOOD GLUCOSE  167 (04 May 2018 11:05)          Culture - Tissue with Gram Stain (collected 18 @ 13:31)  Source: .Tissue Other, right 2nd digit middle phalanx  Gram Stain (18 @ 06:41):    No polymorphonuclear cells seen    No organisms seen        Culture - Tissue with Gram Stain (collected 18 @ 13:31)  Source: .Tissue Other, right 2nd digit middle phalanx  Gram Stain (18 @ 06:41):    No polymorphonuclear cells seen    No organisms seen        **************************Active Medications *******************************************  No Known Allergies      acetaminophen   Tablet. 650 milliGRAM(s) Oral every 6 hours PRN  cefoTEtan  IVPB 2 Gram(s) IV Intermittent every 12 hours  dextrose 5%. 1000 milliLiter(s) IV Continuous <Continuous>  dextrose 50% Injectable 12.5 Gram(s) IV Push once  dextrose 50% Injectable 25 Gram(s) IV Push once  dextrose 50% Injectable 25 Gram(s) IV Push once  dextrose Gel 1 Dose(s) Oral once PRN  enoxaparin Injectable 40 milliGRAM(s) SubCutaneous daily  glucagon  Injectable 1 milliGRAM(s) IntraMuscular once PRN  insulin glargine Injectable (LANTUS) 8 Unit(s) SubCutaneous every morning  insulin lispro (HumaLOG) corrective regimen sliding scale   SubCutaneous three times a day before meals  insulin lispro Injectable (HumaLOG) 4 Unit(s) SubCutaneous three times a day before meals  melatonin 5 milliGRAM(s) Oral at bedtime PRN  sodium chloride 0.9% lock flush 3 milliLiter(s) IV Push every 8 hours      ***************************************************  RADIOLOGY & ADDITIONAL TESTS:    Imaging Personally Reviewed:  [ ] YES  [ ] NO    HEALTH ISSUES - PROBLEM Dx:  Diabetic foot ulcer associated with diabetes mellitus due to underlying condition: Diabetic foot ulcer associated with diabetes mellitus due to underlying condition
CASTAÑEDAMAUREEN SHINE  47y  Male      Patient is a 47y old  Male who presents with a chief complaint of septic arthritis. osteomyelitis  Chief complaint - Pain foot (01 May 2018 01:55)      INTERVAL HPI/OVERNIGHT EVENTS:      ******************************* REVIEW OF SYSTEMS:**********************************************      All other review of systems negative    *********************** VITALS ******************************************    T(F): 96.8 (18 @ 12:39)  HR: 81 (18 @ 12:39) (81 - 83)  BP: 150/95 (18 @ 12:39) (132/88 - 150/95)  RR: 20 (18 @ 12:39) (17 - 20)  SpO2: --    18 @ 07:  -  18 @ 13:14  --------------------------------------------------------  IN: 0 mL / OUT: 900 mL / NET: -900 mL            18 @ 07:  -  18 @ 13:14  --------------------------------------------------------  IN: 0 mL / OUT: 900 mL / NET: -900 mL        ******************************** PHYSICAL EXAM:**************************************************  GENERAL: NAD    PSYCH:   HEENT:     NERVOUS SYSTEM:  Alert & Oriented X3, MS  5/5 B/L  UE and LE ; Sensory intact    PULMONARY: SOTO, CTA    CARDIOVASCULAR: S1S2 RRR    GI: Soft, NT, ND; BS present.    EXTREMITIES:  Right foot wound with dressing    LYMPH: No lymphadenopathy noted    SKIN: No rashes or lesions    ******************************************************************************************    Consultant(s) Notes Reviewed:  [x ] YES  [ ] NO    Discussed with Consultants/Other Providers [ x] YES     **************************** LABS *******************************************************                          11.4   5.47  )-----------( 215      ( 03 May 2018 05:59 )             37.6         141  |  105  |  14  ----------------------------<  157<H>  4.3   |  24  |  0.9    Ca    8.4<L>      03 May 2018 05:59        Urinalysis Basic - ( 02 May 2018 22:50 )    Color: Yellow / Appearance: Clear / S.025 / pH: x  Gluc: x / Ketone: Negative  / Bili: Negative / Urobili: 1.0 mg/dL   Blood: x / Protein: 30 mg/dL / Nitrite: Negative   Leuk Esterase: Negative / RBC: x / WBC x   Sq Epi: x / Non Sq Epi: Occasional /HPF / Bacteria: x        Lactate Trend  05-01 @ 00:21 Lactate:1.2         CAPILLARY BLOOD GLUCOSE  172 (03 May 2018 11:04)              **************************Active Medications *******************************************  No Known Allergies      acetaminophen   Tablet. 650 milliGRAM(s) Oral every 6 hours PRN  cefoTEtan  IVPB 2 Gram(s) IV Intermittent every 12 hours  dextrose 5%. 1000 milliLiter(s) IV Continuous <Continuous>  dextrose 50% Injectable 12.5 Gram(s) IV Push once  dextrose 50% Injectable 25 Gram(s) IV Push once  dextrose 50% Injectable 25 Gram(s) IV Push once  dextrose Gel 1 Dose(s) Oral once PRN  enoxaparin Injectable 40 milliGRAM(s) SubCutaneous daily  glucagon  Injectable 1 milliGRAM(s) IntraMuscular once PRN  insulin glargine Injectable (LANTUS) 8 Unit(s) SubCutaneous every morning  insulin lispro (HumaLOG) corrective regimen sliding scale   SubCutaneous three times a day before meals  insulin lispro Injectable (HumaLOG) 4 Unit(s) SubCutaneous three times a day before meals  melatonin 5 milliGRAM(s) Oral at bedtime PRN  sodium chloride 0.9% lock flush 3 milliLiter(s) IV Push every 8 hours      ***************************************************  RADIOLOGY & ADDITIONAL TESTS:    Imaging Personally Reviewed:  [ ] YES  [ ] NO    HEALTH ISSUES - PROBLEM Dx:  Diabetic foot ulcer associated with diabetes mellitus due to underlying condition: Diabetic foot ulcer associated with diabetes mellitus due to underlying condition
PROGRESS NOTE   Patient is a 47y old  Male who presents with a chief complaint of septic arthritis. osteomyelitis  Chief complaint - Pain foot (01 May 2018 01:55)    Patient seen at bedside with attending. Patient relates that he is worried about his toe but wishes to avoid amputation. Patient informed that amputation may be a possibility but also just removing affected bone is another option. Patient has no other complaints at this time.     HPI:  47 M with PMH of DM presents with 2 week history of open ulcer over medial aspect of right second toe, he has been following with podiatry every week and was seen last thursday in the clinic and dressing was applied.   he reports that today he over exerted and ran also, works as , he noticed that his toe was deformed and saw bone protruding from ulcer, came to ED, got xray which showed -There is erosion across the second PIP joint consistent with septic arthritis and adjacent osteomyelitis. There is second toe swelling with overlap between the first and second toe soft tissues which limits  assessment.     denies fever/n/v/c/d/chills/CP/SOB or any other symptoms,  reports chronic numbness in base of feet and toes.    non compliant with metformin, has not taken in many days, reports that he watches his diet. (01 May 2018 01:55)      Vital Signs Last 24 Hrs  T(C): 36.3 (02 May 2018 04:48), Max: 36.6 (01 May 2018 20:14)  T(F): 97.3 (02 May 2018 04:48), Max: 97.8 (01 May 2018 20:14)  HR: 73 (02 May 2018 04:48) (71 - 77)  BP: 139/71 (02 May 2018 04:48) (123/69 - 139/71)  BP(mean): --  RR: 18 (02 May 2018 04:48) (18 - 18)  SpO2: --                          12.1   6.08  )-----------( 238      ( 01 May 2018 00:21 )             39.6               05-01    139  |  99  |  19  ----------------------------<  169<H>  4.2   |  29  |  1.0    Ca    9.0      01 May 2018 08:41        PHYSICAL EXAM  GEN: MAUREEN CASTAÑEDA is a pleasant well-nourished, well developed 47y Male in no acute distress, alert awake, and oriented to person, place and time.   LE Focused:  right 2nd digit medial aspect ulceration +PTB
SUBJECTIVE:    Patient is a 47y old Male who presents with a chief complaint of septic arthritis. osteomyelitis  Chief complaint - Pain foot (01 May 2018 01:55)    Currently admitted to medicine with the primary diagnosis of Diabetic foot ulcer  Today is hospital day 1d. No events overnight.     PAST MEDICAL & SURGICAL HISTORY  Diabetes  No significant past surgical history      ALLERGIES:  No Known Allergies    MEDICATIONS:  STANDING MEDICATIONS  dextrose 5%. 1000 milliLiter(s) IV Continuous <Continuous>  dextrose 50% Injectable 12.5 Gram(s) IV Push once  dextrose 50% Injectable 25 Gram(s) IV Push once  dextrose 50% Injectable 25 Gram(s) IV Push once  enoxaparin Injectable 40 milliGRAM(s) SubCutaneous daily  insulin glargine Injectable (LANTUS) 8 Unit(s) SubCutaneous every morning  insulin lispro (HumaLOG) corrective regimen sliding scale   SubCutaneous three times a day before meals  insulin lispro Injectable (HumaLOG) 4 Unit(s) SubCutaneous three times a day before meals  levoFLOXacin IVPB 750 milliGRAM(s) IV Intermittent every 24 hours  sodium chloride 0.9% lock flush 3 milliLiter(s) IV Push every 8 hours  vancomycin  IVPB 1500 milliGRAM(s) IV Intermittent every 8 hours    PRN MEDICATIONS  acetaminophen   Tablet. 650 milliGRAM(s) Oral every 6 hours PRN  dextrose Gel 1 Dose(s) Oral once PRN  glucagon  Injectable 1 milliGRAM(s) IntraMuscular once PRN  melatonin 5 milliGRAM(s) Oral at bedtime PRN    VITALS:   T(F): 97.3  HR: 73  BP: 139/71  RR: 18  SpO2: --    LABS:                        12.1   6.08  )-----------( 238      ( 01 May 2018 00:21 )             39.6     05-01    139  |  99  |  19  ----------------------------<  169<H>  4.2   |  29  |  1.0    Ca    9.0      01 May 2018 08:41      PT/INR - ( 01 May 2018 00:21 )   PT: 12.10 sec;   INR: 1.12 ratio      Culture - Blood (collected 01 May 2018 00:21)  Source: .Blood Blood  Preliminary Report (02 May 2018 06:01):    No growth to date.    RADIOLOGY:    PHYSICAL EXAM:  GEN: No acute distress  LUNGS: Clear to auscultation bilaterally   HEART: S1/S2 present. RRR.   ABD: Soft, non-tender, non-distended. Bowel sounds present  EXT: r4ight second toe ulcer covered with dressing   NEURO: AAOX3
SUBJECTIVE:    Patient is a 47y old Male who presents with a chief complaint of septic arthritis. osteomyelitis  Chief complaint - Pain foot (01 May 2018 01:55)    Currently admitted to medicine with the primary diagnosis of Diabetic foot ulcer associated with diabetes mellitus due to underlying condition     Today is hospital day 2d. No events overnight. S/p I&D at bedside.     PAST MEDICAL & SURGICAL HISTORY  Diabetes  No significant past surgical history      ALLERGIES:  No Known Allergies    MEDICATIONS:  STANDING MEDICATIONS  cefoTEtan  IVPB 2 Gram(s) IV Intermittent every 12 hours  dextrose 5%. 1000 milliLiter(s) IV Continuous <Continuous>  dextrose 50% Injectable 12.5 Gram(s) IV Push once  dextrose 50% Injectable 25 Gram(s) IV Push once  dextrose 50% Injectable 25 Gram(s) IV Push once  enoxaparin Injectable 40 milliGRAM(s) SubCutaneous daily  insulin glargine Injectable (LANTUS) 8 Unit(s) SubCutaneous every morning  insulin lispro (HumaLOG) corrective regimen sliding scale   SubCutaneous three times a day before meals  insulin lispro Injectable (HumaLOG) 4 Unit(s) SubCutaneous three times a day before meals  sodium chloride 0.9% lock flush 3 milliLiter(s) IV Push every 8 hours    PRN MEDICATIONS  acetaminophen   Tablet. 650 milliGRAM(s) Oral every 6 hours PRN  dextrose Gel 1 Dose(s) Oral once PRN  glucagon  Injectable 1 milliGRAM(s) IntraMuscular once PRN  melatonin 5 milliGRAM(s) Oral at bedtime PRN    VITALS:   T(F): 96.8  HR: 81  BP: 150/95  RR: 20  SpO2: --    LABS:                        11.4   5.47  )-----------( 215      ( 03 May 2018 05:59 )             37.6     05-03    141  |  105  |  14  ----------------------------<  157<H>  4.3   |  24  |  0.9    Ca    8.4<L>      03 May 2018 05:59        Urinalysis Basic - ( 02 May 2018 22:50 )    Color: Yellow / Appearance: Clear / S.025 / pH: x  Gluc: x / Ketone: Negative  / Bili: Negative / Urobili: 1.0 mg/dL   Blood: x / Protein: 30 mg/dL / Nitrite: Negative   Leuk Esterase: Negative / RBC: x / WBC x   Sq Epi: x / Non Sq Epi: Occasional /HPF / Bacteria: x            Culture - Blood (collected 01 May 2018 00:21)  Source: .Blood Blood  Preliminary Report (02 May 2018 06:01):    No growth to date.          RADIOLOGY:  < from: MR Foot No Cont, Right (18 @ 16:29) >  Impression:    Medial second toe ulcer at the level of the second PIP, with septic   arthritis/osteomyelitis of the second PIP, and pathologic dorsolateral   dislocation of the joint, demonstrating 6 mm bony overlap.      < end of copied text >    PHYSICAL EXAM:  GEN: No acute distress  LUNGS: Clear to auscultation bilaterally   HEART: S1/S2 present. RRR.   ABD: Soft, non-tender, non-distended. Bowel sounds present  EXT: Wound over the right foot  NEURO: AAOX3

## 2018-05-04 NOTE — DISCHARGE NOTE ADULT - CARE PLAN
Principal Discharge DX:	Diabetic foot ulcer associated with diabetes mellitus due to underlying condition  Goal:	Medical management  Assessment and plan of treatment:	Take antibiotics as tolerated and follow up with podiatry; Wound care  Secondary Diagnosis:	Diabetes  Goal:	Control blood sugars  Assessment and plan of treatment:	Diet and medications,

## 2018-05-04 NOTE — DISCHARGE NOTE ADULT - PLAN OF CARE
Medical management Take antibiotics as tolerated and follow up with podiatry; Wound care Control blood sugars Diet and medications,

## 2018-05-04 NOTE — PROCEDURE NOTE - ADDITIONAL PROCEDURE DETAILS
Patient to f/u in podiatry clinic next Thursday 5/10/18
Tip in SVC, 43cm, ready to use
Plan for delayed primary closure in 24 hours

## 2018-05-04 NOTE — PROCEDURE NOTE - GENERAL PROCEDURE DETAILS
Wound margins were refreshed by excising small amount of ST using #15 blade. Wound edges were reapproximated with 2-0 nylon using simple suture technique

## 2018-05-04 NOTE — DISCHARGE NOTE ADULT - PATIENT PORTAL LINK FT
You can access the [facility] Patient Portal, offered by [facility], by registering with the following website: [email or web address]

## 2018-05-04 NOTE — DISCHARGE NOTE ADULT - HOSPITAL COURSE
47 M with h/o DM admitted for osteomyelitis/ septic arthritis. Patietn had bedside debridement and wound closure by podiatry. patient needs 6 week of cefotetan via picc line. Patient needs outpatient podiatry follow up.

## 2018-05-04 NOTE — PROCEDURE NOTE - NSPOSTCAREGUIDE_GEN_A_CORE
Verbal/written post procedure instructions were given to patient/caregiver
Care for catheter as per unit/ICU protocols
Keep the cast/splint/dressing clean and dry

## 2018-05-04 NOTE — DISCHARGE NOTE ADULT - CARE PROVIDER_API CALL
Ethan Diallo (MODE), Podiatric Medicine  [address] [facility]  1st [address]  [place] [address]  Phone: [phone]  Fax: [phone]    Jocelynn Norman), Internal Medicine  [address] [place] [id number]  Phone: [phone]  Fax: [phone]

## 2018-05-04 NOTE — PROGRESS NOTE ADULT - ASSESSMENT
47 M with h/o DM admitted for osteomyelitis    1. Right second toe DFU, r/o acute OM  patient had dislocation of proximal phalynx of 2nd R toe which was repositioned in ED  - Currently on IV vancomycin and levoquin  - ID consult pending .  - MRI pending     2- DM2, uncontrolled.  HbA1C 8.3   - Started on insulin sliding scale   hold meformin     3- DVT ppx- lovenox 40 q24    ambulate as tolerated   full code    WILL FOLLOW.
47 M with h/o DM admitted for osteomyelitis    1- OM right second toe  patient had dislocation of proximal phalynx of 2nd R toe which was repositioned in ED  - Currently on IV vancomycin and levoquin  - ID consult pending   will need PICC line for long term abx  - Podiatry follow up requires medical clearance for possible OR   - MRI pending   2- DM2  HbA1C 8.3   - Started on insulin sliding scale   hold meformin     3- DVT ppx- lovenox 40 q24    ambulate as tolerated   full code
47 M with h/o DM admitted for osteomyelitis    1- OM/ Septic arthritis right second toe s/p debridement   - PICC line and Cefotetan for 6 weeks as recommended by ID   - Wound care   - Podiatry following the patient     2- DM2  HbA1C 8.3   - Insulin sliding scale   hold metformin     3- DVT ppx- lovenox 40 q24    ambulate as tolerated   full code
47 M with h/o DM admitted for osteomyelitis    1. Right second toe DFU, complicated by  acute OM/ septic arthritis  s/p I&D at bedside today.  - Currently on IV vancomycin and levoquin  - awaiting PICC line.    2- DM2, uncontrolled.  HbA1C 8.3   - Started on insulin sliding scale   hold meformin     3- DVT ppx- lovenox 40 q24    ambulate as tolerated   full code    D/C planning after PICC LINE.
47 M with h/o DM admitted for osteomyelitis    1. Right second toe DFU, complicated by  acute OM/ septic arthritis  s/p I&D at bedside yesterday.  - Currently on IV vancomycin and levoquin  - s/p PICC line.    2- DM2, uncontrolled.  HbA1C 8.3   - Started on insulin sliding scale   - can resume Metformin at home.  - would  f/u with Endocrine as outpt.    3- DVT ppx- lovenox 40 q24    ambulate as tolerated   full code    D/C planning with PICC LINE.
right 2nd toe DFU

## 2018-05-04 NOTE — PROCEDURE NOTE - NSSITEPREP_SKIN_A_CORE
chlorhexidine
povidone iodine (if allergic to chlorhexidine)
povidone iodine (if allergic to chlorhexidine)

## 2018-05-06 LAB
CULTURE RESULTS: SIGNIFICANT CHANGE UP
SPECIMEN SOURCE: SIGNIFICANT CHANGE UP

## 2018-05-07 LAB
-  AMPICILLIN/SULBACTAM: SIGNIFICANT CHANGE UP
-  CEFAZOLIN: SIGNIFICANT CHANGE UP
-  CIPROFLOXACIN: SIGNIFICANT CHANGE UP
-  CLINDAMYCIN: SIGNIFICANT CHANGE UP
-  ERYTHROMYCIN: SIGNIFICANT CHANGE UP
-  GENTAMICIN: SIGNIFICANT CHANGE UP
-  LEVOFLOXACIN: SIGNIFICANT CHANGE UP
-  MOXIFLOXACIN(AEROBIC): SIGNIFICANT CHANGE UP
-  OXACILLIN: SIGNIFICANT CHANGE UP
-  PENICILLIN: SIGNIFICANT CHANGE UP
-  RIFAMPIN: SIGNIFICANT CHANGE UP
-  TETRACYCLINE: SIGNIFICANT CHANGE UP
-  TRIMETHOPRIM/SULFAMETHOXAZOLE: SIGNIFICANT CHANGE UP
-  VANCOMYCIN: SIGNIFICANT CHANGE UP
METHOD TYPE: SIGNIFICANT CHANGE UP

## 2018-05-08 DIAGNOSIS — E11.69 TYPE 2 DIABETES MELLITUS WITH OTHER SPECIFIED COMPLICATION: ICD-10-CM

## 2018-05-08 DIAGNOSIS — E11.65 TYPE 2 DIABETES MELLITUS WITH HYPERGLYCEMIA: ICD-10-CM

## 2018-05-08 DIAGNOSIS — L97.516 NON-PRESSURE CHRONIC ULCER OF OTHER PART OF RIGHT FOOT WITH BONE INVOLVEMENT WITHOUT EVIDENCE OF NECROSIS: ICD-10-CM

## 2018-05-08 DIAGNOSIS — M86.171 OTHER ACUTE OSTEOMYELITIS, RIGHT ANKLE AND FOOT: ICD-10-CM

## 2018-05-08 DIAGNOSIS — M21.071 VALGUS DEFORMITY, NOT ELSEWHERE CLASSIFIED, RIGHT ANKLE: ICD-10-CM

## 2018-05-08 DIAGNOSIS — T38.3X5A ADVERSE EFFECT OF INSULIN AND ORAL HYPOGLYCEMIC [ANTIDIABETIC] DRUGS, INITIAL ENCOUNTER: ICD-10-CM

## 2018-05-08 DIAGNOSIS — Z79.84 LONG TERM (CURRENT) USE OF ORAL HYPOGLYCEMIC DRUGS: ICD-10-CM

## 2018-05-08 DIAGNOSIS — E11.621 TYPE 2 DIABETES MELLITUS WITH FOOT ULCER: ICD-10-CM

## 2018-05-08 DIAGNOSIS — M00.871 ARTHRITIS DUE TO OTHER BACTERIA, RIGHT ANKLE AND FOOT: ICD-10-CM

## 2018-05-08 PROBLEM — E11.9 TYPE 2 DIABETES MELLITUS WITHOUT COMPLICATIONS: Chronic | Status: ACTIVE | Noted: 2018-04-30

## 2018-05-08 LAB
CULTURE RESULTS: SIGNIFICANT CHANGE UP
ORGANISM # SPEC MICROSCOPIC CNT: SIGNIFICANT CHANGE UP
ORGANISM # SPEC MICROSCOPIC CNT: SIGNIFICANT CHANGE UP
SPECIMEN SOURCE: SIGNIFICANT CHANGE UP
SURGICAL PATHOLOGY STUDY: SIGNIFICANT CHANGE UP

## 2018-05-10 ENCOUNTER — APPOINTMENT (OUTPATIENT)
Dept: PODIATRY | Facility: CLINIC | Age: 48
End: 2018-05-10
Payer: MEDICAID

## 2018-05-10 ENCOUNTER — OUTPATIENT (OUTPATIENT)
Dept: OUTPATIENT SERVICES | Facility: HOSPITAL | Age: 48
LOS: 1 days | Discharge: HOME | End: 2018-05-10

## 2018-05-10 VITALS
DIASTOLIC BLOOD PRESSURE: 85 MMHG | HEIGHT: 77 IN | WEIGHT: 223 LBS | BODY MASS INDEX: 26.33 KG/M2 | SYSTOLIC BLOOD PRESSURE: 135 MMHG | HEART RATE: 86 BPM

## 2018-05-10 PROCEDURE — 99024 POSTOP FOLLOW-UP VISIT: CPT

## 2018-05-17 ENCOUNTER — OUTPATIENT (OUTPATIENT)
Dept: OUTPATIENT SERVICES | Facility: HOSPITAL | Age: 48
LOS: 1 days | Discharge: HOME | End: 2018-05-17

## 2018-05-17 ENCOUNTER — APPOINTMENT (OUTPATIENT)
Dept: PODIATRY | Facility: CLINIC | Age: 48
End: 2018-05-17
Payer: MEDICAID

## 2018-05-17 VITALS — SYSTOLIC BLOOD PRESSURE: 154 MMHG | DIASTOLIC BLOOD PRESSURE: 92 MMHG | HEART RATE: 98 BPM

## 2018-05-17 VITALS — BODY MASS INDEX: 26.33 KG/M2 | WEIGHT: 223 LBS | HEIGHT: 77 IN

## 2018-05-17 PROCEDURE — 99024 POSTOP FOLLOW-UP VISIT: CPT

## 2018-05-24 ENCOUNTER — OUTPATIENT (OUTPATIENT)
Dept: OUTPATIENT SERVICES | Facility: HOSPITAL | Age: 48
LOS: 1 days | Discharge: HOME | End: 2018-05-24

## 2018-05-24 ENCOUNTER — APPOINTMENT (OUTPATIENT)
Dept: PODIATRY | Facility: CLINIC | Age: 48
End: 2018-05-24
Payer: MEDICAID

## 2018-05-24 VITALS
HEIGHT: 77 IN | SYSTOLIC BLOOD PRESSURE: 145 MMHG | HEART RATE: 98 BPM | DIASTOLIC BLOOD PRESSURE: 96 MMHG | BODY MASS INDEX: 26.21 KG/M2 | WEIGHT: 222 LBS

## 2018-05-24 PROCEDURE — 99024 POSTOP FOLLOW-UP VISIT: CPT

## 2018-05-31 ENCOUNTER — OUTPATIENT (OUTPATIENT)
Dept: OUTPATIENT SERVICES | Facility: HOSPITAL | Age: 48
LOS: 1 days | Discharge: HOME | End: 2018-05-31

## 2018-05-31 ENCOUNTER — APPOINTMENT (OUTPATIENT)
Dept: PODIATRY | Facility: CLINIC | Age: 48
End: 2018-05-31
Payer: MEDICAID

## 2018-05-31 VITALS
DIASTOLIC BLOOD PRESSURE: 95 MMHG | HEIGHT: 77 IN | WEIGHT: 222 LBS | BODY MASS INDEX: 26.21 KG/M2 | HEART RATE: 90 BPM | SYSTOLIC BLOOD PRESSURE: 135 MMHG

## 2018-05-31 PROCEDURE — 99024 POSTOP FOLLOW-UP VISIT: CPT

## 2018-06-05 DIAGNOSIS — E11.42 TYPE 2 DIABETES MELLITUS WITH DIABETIC POLYNEUROPATHY: ICD-10-CM

## 2018-06-05 DIAGNOSIS — L97.519 NON-PRESSURE CHRONIC ULCER OF OTHER PART OF RIGHT FOOT WITH UNSPECIFIED SEVERITY: ICD-10-CM

## 2018-06-05 DIAGNOSIS — M79.671 PAIN IN RIGHT FOOT: ICD-10-CM

## 2018-06-07 ENCOUNTER — OUTPATIENT (OUTPATIENT)
Dept: OUTPATIENT SERVICES | Facility: HOSPITAL | Age: 48
LOS: 1 days | Discharge: HOME | End: 2018-06-07

## 2018-06-07 ENCOUNTER — APPOINTMENT (OUTPATIENT)
Dept: PODIATRY | Facility: CLINIC | Age: 48
End: 2018-06-07
Payer: MEDICAID

## 2018-06-07 VITALS
WEIGHT: 220 LBS | SYSTOLIC BLOOD PRESSURE: 137 MMHG | DIASTOLIC BLOOD PRESSURE: 93 MMHG | BODY MASS INDEX: 25.98 KG/M2 | HEART RATE: 88 BPM | HEIGHT: 77 IN

## 2018-06-07 PROCEDURE — 99213 OFFICE O/P EST LOW 20 MIN: CPT | Mod: 24

## 2018-06-20 ENCOUNTER — OUTPATIENT (OUTPATIENT)
Dept: OUTPATIENT SERVICES | Facility: HOSPITAL | Age: 48
LOS: 1 days | Discharge: HOME | End: 2018-06-20

## 2018-06-20 DIAGNOSIS — M06.9 RHEUMATOID ARTHRITIS, UNSPECIFIED: ICD-10-CM

## 2018-06-20 DIAGNOSIS — I10 ESSENTIAL (PRIMARY) HYPERTENSION: ICD-10-CM

## 2018-10-02 ENCOUNTER — EMERGENCY (EMERGENCY)
Facility: HOSPITAL | Age: 48
LOS: 0 days | Discharge: HOME | End: 2018-10-02
Admitting: PHYSICIAN ASSISTANT

## 2018-10-02 VITALS
SYSTOLIC BLOOD PRESSURE: 165 MMHG | HEART RATE: 100 BPM | DIASTOLIC BLOOD PRESSURE: 95 MMHG | RESPIRATION RATE: 18 BRPM | OXYGEN SATURATION: 100 % | TEMPERATURE: 97 F

## 2018-10-02 DIAGNOSIS — Z79.899 OTHER LONG TERM (CURRENT) DRUG THERAPY: ICD-10-CM

## 2018-10-02 DIAGNOSIS — J34.0 ABSCESS, FURUNCLE AND CARBUNCLE OF NOSE: ICD-10-CM

## 2018-10-02 DIAGNOSIS — J34.89 OTHER SPECIFIED DISORDERS OF NOSE AND NASAL SINUSES: ICD-10-CM

## 2018-10-02 RX ORDER — MUPIROCIN 20 MG/G
1 OINTMENT TOPICAL
Qty: 15 | Refills: 0 | OUTPATIENT
Start: 2018-10-02 | End: 2018-10-08

## 2018-10-02 RX ORDER — MUPIROCIN 20 MG/G
1 OINTMENT TOPICAL
Qty: 30 | Refills: 0
Start: 2018-10-02 | End: 2019-08-25

## 2018-10-02 RX ORDER — MUPIROCIN 20 MG/G
1 OINTMENT TOPICAL
Qty: 30 | Refills: 0
Start: 2018-10-02 | End: 2018-10-08

## 2018-10-02 NOTE — ED PROVIDER NOTE - PHYSICAL EXAMINATION
CONST: Well appearing in NAD  EYES: PERRL, EOMI, Sclera and conjunctiva clear. No proptosis  ENT: No nasal discharge. There is inflamed tissue to right inner nare with swelling and tenderness to lateral nasal wall and tip of nose. No prespetal swelling.  NECK: Non-tender, no meningeal signs  SKIN: Warm, dry, no acute rashes. Good turgor  NEURO: A&Ox3, No focal deficits. Strength 5/5 with no sensory deficits. Steady gait

## 2018-11-01 ENCOUNTER — OUTPATIENT (OUTPATIENT)
Dept: OUTPATIENT SERVICES | Facility: HOSPITAL | Age: 48
LOS: 1 days | End: 2018-11-01
Payer: COMMERCIAL

## 2018-11-01 PROCEDURE — G9001: CPT

## 2018-11-19 ENCOUNTER — EMERGENCY (EMERGENCY)
Facility: HOSPITAL | Age: 48
LOS: 0 days | Discharge: HOME | End: 2018-11-19
Attending: STUDENT IN AN ORGANIZED HEALTH CARE EDUCATION/TRAINING PROGRAM | Admitting: STUDENT IN AN ORGANIZED HEALTH CARE EDUCATION/TRAINING PROGRAM

## 2018-11-19 VITALS
DIASTOLIC BLOOD PRESSURE: 100 MMHG | TEMPERATURE: 97 F | OXYGEN SATURATION: 100 % | RESPIRATION RATE: 18 BRPM | SYSTOLIC BLOOD PRESSURE: 185 MMHG | HEART RATE: 104 BPM

## 2018-11-19 DIAGNOSIS — E11.9 TYPE 2 DIABETES MELLITUS WITHOUT COMPLICATIONS: ICD-10-CM

## 2018-11-19 DIAGNOSIS — Z98.890 OTHER SPECIFIED POSTPROCEDURAL STATES: Chronic | ICD-10-CM

## 2018-11-19 DIAGNOSIS — Z79.84 LONG TERM (CURRENT) USE OF ORAL HYPOGLYCEMIC DRUGS: ICD-10-CM

## 2018-11-19 DIAGNOSIS — Z79.2 LONG TERM (CURRENT) USE OF ANTIBIOTICS: ICD-10-CM

## 2018-11-19 DIAGNOSIS — H57.10 OCULAR PAIN, UNSPECIFIED EYE: ICD-10-CM

## 2018-11-19 DIAGNOSIS — Z79.899 OTHER LONG TERM (CURRENT) DRUG THERAPY: ICD-10-CM

## 2018-11-19 DIAGNOSIS — H57.13 OCULAR PAIN, BILATERAL: ICD-10-CM

## 2018-11-19 RX ORDER — PREDNISOLONE SODIUM PHOSPHATE 1 %
1 DROPS OPHTHALMIC (EYE)
Qty: 5 | Refills: 0
Start: 2018-11-19 | End: 2018-11-23

## 2018-11-19 RX ORDER — FLUORESCEIN SODIUM 9 MG
2 STRIP OPHTHALMIC (EYE) ONCE
Qty: 0 | Refills: 0 | Status: COMPLETED | OUTPATIENT
Start: 2018-11-19 | End: 2018-11-19

## 2018-11-19 RX ORDER — CYCLOPENTOLATE HYDROCHLORIDE 10 MG/ML
1 SOLUTION/ DROPS OPHTHALMIC
Qty: 5 | Refills: 0
Start: 2018-11-19 | End: 2018-11-23

## 2018-11-19 RX ADMIN — Medication 1 DROP(S): at 14:09

## 2018-11-19 RX ADMIN — Medication 2 APPLICATION(S): at 14:09

## 2018-11-19 NOTE — ED PROVIDER NOTE - NS ED ROS FT
Constitutional: (-) fever  Eyes: +photophobia, conj injection  Cardiovascular: (-) syncope  Integumentary: (-) rash  Neurological: (-) altered mental status

## 2018-11-19 NOTE — CONSULT NOTE ADULT - ASSESSMENT
1) Bilateral Acute Anterior Uveitis OU  - START prednisolone acetate 1% oph susp q6H OU  - Pt advised to f/u with current eye doctors at [facility] in 1 day  - if unable, Pt to f/u at [facility] eye clinic in 1 day. 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 HERNANDO Rios  - Pt advised to schedule f/u with current eye care providers at [facility] for longterm follow up

## 2018-11-19 NOTE — ED PROVIDER NOTE - OBJECTIVE STATEMENT
49 yo m hx dm here for b/l eye pain. pt had laser eye procedure 1 month ago. pt had been on topical nsaids and steroids. this morning, pt endorses b/l moderate eye pain w/ photophobia. no trauma. no discharge. eyes watery. no change in vision, however, light irritating eyes. no fever, chills.

## 2018-11-19 NOTE — CONSULT NOTE ADULT - SUBJECTIVE AND OBJECTIVE BOX
MAUREEN CASTAÑEDA  [id number]  48y Male      Patient is a 48y old  Male who presents with a chief complaint of eye pain OU  HEALTH ISSUES - R/O PROBLEM Dx:    HPI: Pt reports sudden eye pain in both eyes for the last 24 hours. Pt reports that he is very sensitive to light. Pt reports watery discharge, denies eye pain. Denies blurry vision  (-)burning, itchingOD/OS  (-)flashes, floaters OD/OS    PAST MEDICAL & SURGICAL HISTORY:  Diabetes  History of eye surgery    MEDICATIONS  (STANDING):    MEDICATIONS  (PRN):    Allergies    No Known Allergies    Intolerances        POHx:  CORI: 1 month c [facility]  Diabetic Retinopathy OU    (-)injuries/surgeries    Ocular Medications: none    FOHx: Non-contributory    VISUAL ACUITY  OD: 20/40 PH: 20/30-2  OS 20/50  PH: 20/30+2    NEURO TESTING  Pupils: 	PERRL, (-) APD OD/OS  EOMS: 	FULL OD/OS  CVF: 	Full to red light OD/OS    ANTERIOR SEGMENT EVALUATION:   lids/lashes: 	clear OD/OS  conjunctiva: 	1+ diffuse injection OD/OS  cornea: 	clear OD/OS  anterior chamber: OD: trace flare; OS: 1+ flare  iris: 	flat and even OD/OS    INTRAOCULAR PRESSURE  Goldmann Applanation Tonometry  OD: 16mmHg  OS: 16 mmHg  @4:01pm    POSTERIOR SEGMENT EVALUATION  Dilated: Tropicamide 1%, Phenylephrine 2.5% OD/OS  Lens: 		clear OD/OS  Vitreous: 	clear OD/OS  Optic nerve:	distinct, pink and healthy OD/OS  Macula: 	flat, even OD/OS  Vessels: 	2:3 OD/OS  Periphery: 	flat, (-)holes/breaks/tears 360 OD/OS    SUPPLEMENTARY TESTING: MAUREEN CASTAÑEDA  [id number]  48y Male      Patient is a 48y old  Male who presents with a chief complaint of eye pain OU    HPI: Pt reports sudden eye pain in both eyes for the last 24 hours. Pt reports that he is very sensitive to light. Pt reports watery discharge, denies eye pain. Denies blurry vision. Pt denies recent cold/flu or sick contacts. Pt reports history of having retinal lasers for patches of blood in both retinas - was performed at [facility] 1 month ago. Pt has follow up scheduled with them in 2 months. Pt denies having these symptoms previously.   (-)burning, itching OD/OS  (-)flashes, floaters OD/OS    PAST MEDICAL & SURGICAL HISTORY:  Diabetes  History of eye surgery    MEDICATIONS  (STANDING):  metformin  trulicity    Allergies  No Known Allergies    Intolerances  None      POHx:  CORI: 1 month c [facility]  Diabetic Retinopathy OU    (-)injuries/surgeries    Ocular Medications:  ketorolac q12H OU  prednisolone acetate 1% oph susp q12H OU    FOHx: Non-contributory    VISUAL ACUITY  OD: 20/40 PH: 20/30-2  OS 20/50  PH: 20/30+2    NEURO TESTING  Pupils: 	PERRL, (-) APD OD/OS, miotic pupils   EOMS: 	FULL OD/OS  CVF: 	Full to red light OD/OS    ANTERIOR SEGMENT EVALUATION:   lids/lashes: 	clear OD/OS  conjunctiva: 	1+ diffuse injection OD/OS  cornea: 	clear OD/OS  anterior chamber: OD: trace flare; OS: 1+ flare  iris: 	flat and even OD/OS    INTRAOCULAR PRESSURE  Goldmann Applanation Tonometry  OD: 16mmHg  OS: 16 mmHg  @4:01pm    POSTERIOR SEGMENT EVALUATION - very poor dilation with 2 rounds of gtts  Dilated: Tropicamide 1%, Phenylephrine 2.5% OD/OS  Lens: 		clear OD/OS  Vitreous: 	clear OD/OS  Optic nerve:	distinct, 0.4/0.4 pink and healthy OD/OS  Macula: 	flat, even OD/OS  Vessels: 	OD: weiss spot along superior arcades, hemorrhage, sheathed vessels inferior; OS: hemes, sheathed vessels superior   Periphery: 	flat, (-)holes/breaks/tears 360 OD/OS

## 2018-11-19 NOTE — ED PROVIDER NOTE - NSFOLLOWUPCLINICS_GEN_ALL_ED_FT
[facility]  Ophthalmolgy  [address], [address]  [place] [address]  Phone: [phone]  Fax:   Follow Up Time:

## 2018-11-19 NOTE — ED PROVIDER NOTE - PROGRESS NOTE DETAILS
d/w Optho ALEKS Godoy who discussed case w/ her attending. pt can be d/c from ED and transferred to eye clinic in [facility], 2nd floor discussed with patient conversation w/ ophto. pt comfortable w/ dc and transfer to [facility].  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

## 2018-11-19 NOTE — ED PROVIDER NOTE - NSFOLLOWUPINSTRUCTIONS_ED_ALL_ED_FT
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 HERNANDO Rios

## 2018-11-19 NOTE — ED ADULT NURSE NOTE - NSIMPLEMENTINTERV_GEN_ALL_ED
Implemented All Universal Safety Interventions:  [place] to call system. Call bell, personal items and telephone within reach. Instruct patient to call for assistance. Room bathroom lighting operational. Non-slip footwear when patient is off stretcher. Physically safe environment: no spills, clutter or unnecessary equipment. Stretcher in lowest position, wheels locked, appropriate side rails in place.

## 2018-11-19 NOTE — ED PROVIDER NOTE - PHYSICAL EXAMINATION
PHYSICAL EXAM:    Constitutional: awake, alert, NAD  Eyes: EOMI, + b/l conj injection to limbus,   OD: 20/50, IOP 11, fluorescein w/o uptake, pupils 4mm and reactive  OS: 20/50, IOP 11, fluorescein w/o uptake, pupils 4mm and reactive  Back: no c/t/l spine ttp  Respiratory: no respiratory distress, breath sounds equal b/l, no wheezing, rhonchi or stridor.   Cardiovascular: RRR nml S1S2  Neurological: AAOx3, CN II-XII grossly intact, no focal numbness or weakness

## 2018-11-20 DIAGNOSIS — Z71.89 OTHER SPECIFIED COUNSELING: ICD-10-CM

## 2018-12-04 ENCOUNTER — OUTPATIENT (OUTPATIENT)
Dept: OUTPATIENT SERVICES | Facility: HOSPITAL | Age: 48
LOS: 1 days | Discharge: HOME | End: 2018-12-04

## 2018-12-04 DIAGNOSIS — Z98.890 OTHER SPECIFIED POSTPROCEDURAL STATES: Chronic | ICD-10-CM

## 2018-12-12 ENCOUNTER — OUTPATIENT (OUTPATIENT)
Dept: OUTPATIENT SERVICES | Facility: HOSPITAL | Age: 48
LOS: 1 days | Discharge: HOME | End: 2018-12-12

## 2018-12-12 DIAGNOSIS — Z01.21 ENCOUNTER FOR DENTAL EXAMINATION AND CLEANING WITH ABNORMAL FINDINGS: ICD-10-CM

## 2018-12-12 DIAGNOSIS — Z98.890 OTHER SPECIFIED POSTPROCEDURAL STATES: Chronic | ICD-10-CM

## 2018-12-20 ENCOUNTER — APPOINTMENT (OUTPATIENT)
Dept: PODIATRY | Facility: CLINIC | Age: 48
End: 2018-12-20

## 2019-01-04 ENCOUNTER — OUTPATIENT (OUTPATIENT)
Dept: OUTPATIENT SERVICES | Facility: HOSPITAL | Age: 49
LOS: 1 days | Discharge: HOME | End: 2019-01-04

## 2019-01-04 DIAGNOSIS — Z98.890 OTHER SPECIFIED POSTPROCEDURAL STATES: Chronic | ICD-10-CM

## 2019-01-04 DIAGNOSIS — K02.62 DENTAL CARIES ON SMOOTH SURFACE PENETRATING INTO DENTIN: ICD-10-CM

## 2019-01-11 ENCOUNTER — OUTPATIENT (OUTPATIENT)
Dept: OUTPATIENT SERVICES | Facility: HOSPITAL | Age: 49
LOS: 1 days | Discharge: HOME | End: 2019-01-11

## 2019-01-11 DIAGNOSIS — Z98.890 OTHER SPECIFIED POSTPROCEDURAL STATES: Chronic | ICD-10-CM

## 2019-01-18 ENCOUNTER — OUTPATIENT (OUTPATIENT)
Dept: OUTPATIENT SERVICES | Facility: HOSPITAL | Age: 49
LOS: 1 days | Discharge: HOME | End: 2019-01-18

## 2019-01-18 DIAGNOSIS — Z98.890 OTHER SPECIFIED POSTPROCEDURAL STATES: Chronic | ICD-10-CM

## 2019-01-22 DIAGNOSIS — K02.62 DENTAL CARIES ON SMOOTH SURFACE PENETRATING INTO DENTIN: ICD-10-CM

## 2019-01-25 ENCOUNTER — OUTPATIENT (OUTPATIENT)
Dept: OUTPATIENT SERVICES | Facility: HOSPITAL | Age: 49
LOS: 1 days | Discharge: HOME | End: 2019-01-25

## 2019-01-25 DIAGNOSIS — Z98.890 OTHER SPECIFIED POSTPROCEDURAL STATES: Chronic | ICD-10-CM

## 2019-01-29 DIAGNOSIS — K02.62 DENTAL CARIES ON SMOOTH SURFACE PENETRATING INTO DENTIN: ICD-10-CM

## 2019-02-15 ENCOUNTER — OUTPATIENT (OUTPATIENT)
Dept: OUTPATIENT SERVICES | Facility: HOSPITAL | Age: 49
LOS: 1 days | Discharge: HOME | End: 2019-02-15

## 2019-02-15 DIAGNOSIS — Z98.890 OTHER SPECIFIED POSTPROCEDURAL STATES: Chronic | ICD-10-CM

## 2019-02-15 DIAGNOSIS — K02.62 DENTAL CARIES ON SMOOTH SURFACE PENETRATING INTO DENTIN: ICD-10-CM

## 2019-06-19 NOTE — ED PROVIDER NOTE - OBJECTIVE STATEMENT
Pt with DM c/o one week of right nostril pain and swelling. No fever. no relief with neosporin. Denies trauma, vision change
no distress/obese/well-developed/well-nourished/well-groomed

## 2019-08-19 ENCOUNTER — EMERGENCY (EMERGENCY)
Facility: HOSPITAL | Age: 49
LOS: 0 days | Discharge: HOME | End: 2019-08-19
Admitting: EMERGENCY MEDICINE
Payer: MEDICAID

## 2019-08-19 VITALS
DIASTOLIC BLOOD PRESSURE: 100 MMHG | HEART RATE: 100 BPM | SYSTOLIC BLOOD PRESSURE: 174 MMHG | RESPIRATION RATE: 18 BRPM | OXYGEN SATURATION: 100 % | TEMPERATURE: 98 F

## 2019-08-19 DIAGNOSIS — S89.90XA UNSPECIFIED INJURY OF UNSPECIFIED LOWER LEG, INITIAL ENCOUNTER: ICD-10-CM

## 2019-08-19 DIAGNOSIS — Z23 ENCOUNTER FOR IMMUNIZATION: ICD-10-CM

## 2019-08-19 DIAGNOSIS — Z98.890 OTHER SPECIFIED POSTPROCEDURAL STATES: Chronic | ICD-10-CM
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
## 2019-08-19 DIAGNOSIS — S91.312A LACERATION WITHOUT FOREIGN BODY, LEFT FOOT, INITIAL ENCOUNTER: ICD-10-CM
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
## 2019-08-19 DIAGNOSIS — Z98.890 OTHER SPECIFIED POSTPROCEDURAL STATES: ICD-10-CM
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
## 2019-08-19 PROCEDURE — 99283 EMERGENCY DEPT VISIT LOW MDM: CPT

## 2019-08-19 PROCEDURE — 73630 X-RAY EXAM OF FOOT: CPT | Mod: 26,LT

## 2019-08-19 RX ORDER — CEPHALEXIN 500 MG
1 CAPSULE ORAL
Qty: 28 | Refills: 0
Start: 2019-08-19 | End: 2019-08-25

## 2019-08-19 RX ORDER — BACITRACIN ZINC 500 UNIT/G
1 OINTMENT IN PACKET (EA) TOPICAL
Qty: 30 | Refills: 0
Start: 2019-08-19 | End: 2019-08-28

## 2019-08-19 RX ORDER — TETANUS TOXOID, REDUCED DIPHTHERIA TOXOID AND ACELLULAR PERTUSSIS VACCINE, ADSORBED 5; 2.5; 8; 8; 2.5 [IU]/.5ML; [IU]/.5ML; UG/.5ML; UG/.5ML; UG/.5ML
0.5 SUSPENSION INTRAMUSCULAR ONCE
Refills: 0 | Status: COMPLETED | OUTPATIENT
Start: 2019-08-19 | End: 2019-08-19

## 2019-08-19 RX ADMIN — TETANUS TOXOID, REDUCED DIPHTHERIA TOXOID AND ACELLULAR PERTUSSIS VACCINE, ADSORBED 0.5 MILLILITER(S): 5; 2.5; 8; 8; 2.5 SUSPENSION INTRAMUSCULAR at 09:38

## 2019-08-19 NOTE — ED PROVIDER NOTE - PROGRESS NOTE DETAILS
Wound open >24 hours will close by 2nd intent. Xray no fb. Wound dressed. Tdap and Keflex. F/U Podiatry. Patient BP high hasn't taken medication will take when he gets home. no HA/ dizziness/ nausea/ vomit/ weakness

## 2019-08-19 NOTE — ED PROVIDER NOTE - OBJECTIVE STATEMENT
50 y/o male with hx DM, HTN presents to the ED c/o "I cut my left heel on rocks in the [facility] 3 days ago. I want to get it checked out -I'm worried about getting an infection." no fever/ chills/ weakness

## 2019-08-19 NOTE — ED ADULT NURSE NOTE - OBJECTIVE STATEMENT
pt was in [facility] 3 days ago, stepped on rock and cut left heel. pt has history of DM c/o discomfort.

## 2019-08-19 NOTE — ED ADULT NURSE NOTE - NSIMPLEMENTINTERV_GEN_ALL_ED
Implemented All Universal Safety Interventions:  [place] to call system. Call bell, personal items and telephone within reach. Instruct patient to call for assistance. Room bathroom lighting operational. Non-slip footwear when patient is off stretcher. Physically safe environment: no spills, clutter or unnecessary equipment. Stretcher in lowest position, wheels locked, appropriate side rails in place.

## 2019-12-06 PROBLEM — I10 ESSENTIAL (PRIMARY) HYPERTENSION: Chronic | Status: ACTIVE | Noted: 2019-08-19

## 2019-12-18 ENCOUNTER — APPOINTMENT (OUTPATIENT)
Dept: UROLOGY | Facility: CLINIC | Age: 49
End: 2019-12-18

## 2020-01-28 ENCOUNTER — APPOINTMENT (OUTPATIENT)
Dept: UROLOGY | Facility: CLINIC | Age: 50
End: 2020-01-28
Payer: MEDICAID

## 2020-01-28 PROCEDURE — 99204 OFFICE O/P NEW MOD 45 MIN: CPT

## 2020-01-28 NOTE — HISTORY OF PRESENT ILLNESS
[FreeTextEntry1] : MAUREEN CASTAÑEDA is a 49 year old male who presents with erectile dysfunction. Previously had poorly controlled DM w DM ulcer; now states it is controlled.  Has been having ED for the last year. Tried cialis to no avail.\par Good libido. Denies LUTS.\par Exercises, can walk > 2 flights of stairs no CP. No cardiac hx\par Denies gross hematuria, dysuria or associated symptoms. Denies pelvic pain.\par \par Denies  PMH including previous kidney stones, recurrent UTIs. \par Family History: No  malignancies\par Soc hx on disability \par

## 2020-01-28 NOTE — ASSESSMENT
[FreeTextEntry1] : MAUREEN CASTAÑEAD is a 49 year old male who presents with erectile dysfunction. \par Pt would like to try viagra even though low likelihood of success given prior failure.\par Refer to Dr Young for ICI vs IPP\par Testosterone\par PSA\par UA\par \par \par Based on the patient's history, he was prescribed a PDE5 inhibior. The patient understands that the risks of this medication include facial redness, flushing, nasal congestion, GERD, back pain, priapism, chest pain/MI, dizziness, drop in BP, changes in vision. ER precautions were given. The patient has been screened for potential medication interactions. He is not on any nitrates, po antifungals or HIV meds. He is not on alpha blockers.\par  I recommended that the patient take the first dose by himself. He knows that the medication requires approximately 45 minutes to have effect and works best on an empty stomach. He is aware sexual stimulation is required.\par \par \par

## 2020-01-28 NOTE — PHYSICAL EXAM
[General Appearance - Well Nourished] : well nourished [General Appearance - Well Developed] : well developed [Well Groomed] : well groomed [Normal Appearance] : normal appearance [Edema] : no peripheral edema [General Appearance - In No Acute Distress] : no acute distress [Exaggerated Use Of Accessory Muscles For Inspiration] : no accessory muscle use [Respiration, Rhythm And Depth] : normal respiratory rhythm and effort [Costovertebral Angle Tenderness] : no ~M costovertebral angle tenderness [Abdomen Soft] : soft [Abdomen Tenderness] : non-tender [Urethral Meatus] : meatus normal [Urinary Bladder Findings] : the bladder was normal on palpation [Scrotum] : the scrotum was normal [Testes Mass (___cm)] : there were no testicular masses [] : no rash [Normal Station and Gait] : the gait and station were normal for the patient's age [No Focal Deficits] : no focal deficits [Oriented To Time, Place, And Person] : oriented to person, place, and time [Affect] : the affect was normal [Mood] : the mood was normal [Not Anxious] : not anxious [No Palpable Adenopathy] : no palpable adenopathy

## 2020-01-30 ENCOUNTER — LABORATORY RESULT (OUTPATIENT)
Age: 50
End: 2020-01-30

## 2020-01-31 LAB
PSA FREE FLD-MCNC: 17 %
PSA FREE SERPL-MCNC: 0.2 NG/ML
PSA SERPL-MCNC: 1.15 NG/ML

## 2020-02-03 LAB
APPEARANCE: CLEAR
BILIRUBIN URINE: NEGATIVE
BLOOD URINE: NEGATIVE
COLOR: YELLOW
GLUCOSE QUALITATIVE U: NEGATIVE
KETONES URINE: NEGATIVE
LEUKOCYTE ESTERASE URINE: NEGATIVE
NITRITE URINE: NEGATIVE
PH URINE: 6.5
PROTEIN URINE: ABNORMAL
SPECIFIC GRAVITY URINE: 1.03
UROBILINOGEN URINE: NORMAL

## 2020-02-06 LAB
TESTOST BND SERPL-MCNC: 9.39 NG/DL
TESTOSTERONE BIOAVAILABLE: 97 NG/DL
TESTOSTERONE TOTAL S: 361 NG/DL

## 2020-02-24 ENCOUNTER — APPOINTMENT (OUTPATIENT)
Dept: UROLOGY | Facility: CLINIC | Age: 50
End: 2020-02-24
Payer: MEDICAID

## 2020-02-24 PROCEDURE — 99214 OFFICE O/P EST MOD 30 MIN: CPT

## 2020-02-24 NOTE — PHYSICAL EXAM
[Normal Appearance] : normal appearance [General Appearance - Well Nourished] : well nourished [General Appearance - Well Developed] : well developed [General Appearance - In No Acute Distress] : no acute distress [Well Groomed] : well groomed [Edema] : no peripheral edema [] : no rash [Respiration, Rhythm And Depth] : normal respiratory rhythm and effort [Exaggerated Use Of Accessory Muscles For Inspiration] : no accessory muscle use [Mood] : the mood was normal [Oriented To Time, Place, And Person] : oriented to person, place, and time [Affect] : the affect was normal [Not Anxious] : not anxious [Normal Station and Gait] : the gait and station were normal for the patient's age [No Focal Deficits] : no focal deficits [No Palpable Adenopathy] : no palpable adenopathy

## 2020-02-24 NOTE — HISTORY OF PRESENT ILLNESS
[FreeTextEntry1] : MAUREEN CASTAÑEDA is a 49 year old male hx DM who presents with erectile dysfunction. \par \par Last visit we prescribed sildenafil and pt has not taken the medicine.\par Testosterone 360\par UA neg for microheme or pyuria\par PSA 1.15\par \par Previously had poorly controlled DM w DM ulcer; now states it is controlled.  Has been having ED for the last year. Tried cialis to no avail.\par Good libido. Denies LUTS.\par Exercises, can walk > 2 flights of stairs no CP. No cardiac hx\par Denies gross hematuria, dysuria or associated symptoms. Denies pelvic pain.\par \par Denies  PMH including previous kidney stones, recurrent UTIs. \par Family History: No  malignancies\par Soc hx on disability \par

## 2020-02-24 NOTE — ASSESSMENT
[FreeTextEntry1] : MAUREEN CASTAÑEDA is a 49 year old male hx DM who presents with erectile dysfunction. \par Refer to Dr Young for ICI vs IPP\par

## 2020-03-01 ENCOUNTER — OUTPATIENT (OUTPATIENT)
Dept: OUTPATIENT SERVICES | Facility: HOSPITAL | Age: 50
LOS: 1 days | End: 2020-03-01
Payer: COMMERCIAL

## 2020-03-01 DIAGNOSIS — Z98.890 OTHER SPECIFIED POSTPROCEDURAL STATES: Chronic | ICD-10-CM

## 2020-03-06 ENCOUNTER — APPOINTMENT (OUTPATIENT)
Dept: UROLOGY | Facility: CLINIC | Age: 50
End: 2020-03-06
Payer: MEDICAID

## 2020-03-06 DIAGNOSIS — Z78.9 OTHER SPECIFIED HEALTH STATUS: ICD-10-CM

## 2020-03-06 PROCEDURE — 99214 OFFICE O/P EST MOD 30 MIN: CPT

## 2020-03-10 NOTE — HISTORY OF PRESENT ILLNESS
[FreeTextEntry1] : MAUREEN CASTAÑEDA is a 49 year old male hx DM who presents with erectile dysfunction. Referred by Dr Jimenez\par \par was prescribed sidlenafil recently \par Testosterone 360\par UA neg for microheme or pyuria\par PSA 1.15\par \par Previously had poorly controlled DM w DM ulcer; now states it is controlled. Has been having ED for the last year. Tried cialis to no avail.\par Good libido. Denies LUTS.\par Exercises, can walk > 2 flights of stairs no CP. No cardiac hx\par Denies gross hematuria, dysuria or associated symptoms. Denies pelvic pain.\par \par Denies  PMH including previous kidney stones, recurrent UTIs. \par Family History: No  malignancies\par Soc hx on disability

## 2020-03-26 DIAGNOSIS — Z71.89 OTHER SPECIFIED COUNSELING: ICD-10-CM

## 2020-06-01 ENCOUNTER — APPOINTMENT (OUTPATIENT)
Dept: UROLOGY | Facility: CLINIC | Age: 50
End: 2020-06-01
Payer: MEDICAID

## 2020-06-01 PROCEDURE — 99213 OFFICE O/P EST LOW 20 MIN: CPT

## 2020-06-01 NOTE — PHYSICAL EXAM
[General Appearance - Well Developed] : well developed [Normal Appearance] : normal appearance [General Appearance - Well Nourished] : well nourished [General Appearance - In No Acute Distress] : no acute distress [Well Groomed] : well groomed [Abdomen Soft] : soft [Abdomen Tenderness] : non-tender [Costovertebral Angle Tenderness] : no ~M costovertebral angle tenderness [Edema] : no peripheral edema [] : no respiratory distress [Respiration, Rhythm And Depth] : normal respiratory rhythm and effort [Exaggerated Use Of Accessory Muscles For Inspiration] : no accessory muscle use [Oriented To Time, Place, And Person] : oriented to person, place, and time [Affect] : the affect was normal [Mood] : the mood was normal [Not Anxious] : not anxious [No Focal Deficits] : no focal deficits [Normal Station and Gait] : the gait and station were normal for the patient's age

## 2020-06-01 NOTE — HISTORY OF PRESENT ILLNESS
[Currently Experiencing ___] :  [unfilled] [Erectile Dysfunction] : Erectile Dysfunction [FreeTextEntry1] : MAUREEN CASTAÑEDA is a 50 year old male with a past medical history of DM and ED. Presents to the office today for a follow up, last seen on 3/6/2020. He has tried Sildenafil with no good results and Cialis with minimal results. Good libido. He is here for Trimix trial how ever just took cialis so will be best to wait\par \par since our last visit tried to vacuum device but was not able to maintain a satisfactory erection\par \par 1/2020  PSA 1.15 ng/ml\par

## 2020-06-01 NOTE — ASSESSMENT
[FreeTextEntry1] : MAUREEN CASTAÑEDA is a 50 year old male with a past medical history of DM and ED. Presents to the office today for a follow up, last seen on 3/6/2020. He has tried Sildenafil with no good results and Cialis with minimal results. Good libido. He is here for Trimix trial how ever just took cialis so will be best to wait\par \par since our last visit tried to vacuum device but was not able to maintain a satisfactory erection\par \par 1/2020  PSA 1.15 ng/ml\par

## 2020-06-05 ENCOUNTER — APPOINTMENT (OUTPATIENT)
Dept: UROLOGY | Facility: CLINIC | Age: 50
End: 2020-06-05
Payer: MEDICAID

## 2020-06-05 VITALS
SYSTOLIC BLOOD PRESSURE: 140 MMHG | HEIGHT: 77 IN | HEART RATE: 70 BPM | WEIGHT: 220 LBS | BODY MASS INDEX: 25.98 KG/M2 | DIASTOLIC BLOOD PRESSURE: 85 MMHG | TEMPERATURE: 95.7 F

## 2020-06-05 PROCEDURE — 99215 OFFICE O/P EST HI 40 MIN: CPT

## 2020-06-05 NOTE — HISTORY OF PRESENT ILLNESS
[FreeTextEntry1] : MAUREEN CASTAÑEDA is a 50 year old male with a past medical history of DM and ED. Presents to the office today for a follow up, last seen on 3/6/2020. He has tried Sildenafil with no good results and Cialis with minimal results. Good libido. He is here for Trimix trial \par \par since our last visit tried to vacuum device but was not able to maintain a satisfactory erection\par \par 1/2020 PSA 1.15 ng/ml\par trimix inj with 0.2ml #5 today showed 20% erection\par

## 2020-07-01 ENCOUNTER — APPOINTMENT (OUTPATIENT)
Dept: UROLOGY | Facility: CLINIC | Age: 50
End: 2020-07-01
Payer: MEDICAID

## 2020-07-01 VITALS — WEIGHT: 220 LBS | TEMPERATURE: 98.3 F | BODY MASS INDEX: 25.98 KG/M2 | HEIGHT: 77 IN

## 2020-07-01 PROCEDURE — 99213 OFFICE O/P EST LOW 20 MIN: CPT

## 2020-07-01 RX ORDER — SILDENAFIL CITRATE 100 MG/1
100 TABLET, FILM COATED ORAL
Qty: 3 | Refills: 3 | Status: DISCONTINUED | COMMUNITY
Start: 2020-03-06 | End: 2020-07-01

## 2020-07-01 RX ORDER — SILDENAFIL 100 MG/1
100 TABLET, FILM COATED ORAL
Qty: 30 | Refills: 11 | Status: DISCONTINUED | COMMUNITY
Start: 2020-01-28 | End: 2020-07-01

## 2020-07-01 NOTE — HISTORY OF PRESENT ILLNESS
[FreeTextEntry1] : MAUREEN CASTAÑEDA is a 50 year old male with a past medical history of DM and ED.\par \par  He has tried Sildenafil with no good results and Cialis with minimal results. Good libido. \par \par since our last visit tried to vacuum device but was not able to maintain a satisfactory erection\par \par 1/2020 PSA 1.15 ng/ml\par trimix inj with 0.2ml #5 last visit showed 20% erection\par \par has increase dose to 0.7ml and is able to have 70% erection -- still wishes for firmer erection

## 2020-07-01 NOTE — PHYSICAL EXAM
[General Appearance - Well Nourished] : well nourished [General Appearance - Well Developed] : well developed [Well Groomed] : well groomed [Normal Appearance] : normal appearance [Abdomen Soft] : soft [General Appearance - In No Acute Distress] : no acute distress [Abdomen Tenderness] : non-tender [Costovertebral Angle Tenderness] : no ~M costovertebral angle tenderness [Skin Color & Pigmentation] : normal skin color and pigmentation [Edema] : no peripheral edema [] : no respiratory distress [Respiration, Rhythm And Depth] : normal respiratory rhythm and effort [Exaggerated Use Of Accessory Muscles For Inspiration] : no accessory muscle use [Oriented To Time, Place, And Person] : oriented to person, place, and time [Affect] : the affect was normal [Mood] : the mood was normal [Not Anxious] : not anxious [Normal Station and Gait] : the gait and station were normal for the patient's age [No Focal Deficits] : no focal deficits

## 2020-07-19 ENCOUNTER — APPOINTMENT (OUTPATIENT)
Dept: UROLOGY | Facility: CLINIC | Age: 50
End: 2020-07-19
Payer: MEDICAID

## 2020-07-19 PROCEDURE — 99212 OFFICE O/P EST SF 10 MIN: CPT | Mod: 95

## 2020-07-19 NOTE — ASSESSMENT
[FreeTextEntry1] : \par MAUREEN CASTAÑEDA is a 50 year old male with a past medical history of DM and ED.\par \par  He has tried Sildenafil with no good results and Cialis with minimal results. Good libido. \par \par since our last visit tried to vacuum device but was not able to maintain a satisfactory erection\par \par 1/2020 PSA 1.15 ng/ml\par trimix inj with 0.2ml #5 last visit showed 20% erection\par \par has increase dose to 0.7ml and is able to have 70% erection -- still wishes for firmer erection. \par was supposed to increase the dosage but has not gotten a chance to

## 2020-07-19 NOTE — HISTORY OF PRESENT ILLNESS
[Home] : at home, [unfilled] , at the time of the visit. [Other Location: e.g. Home (Enter Location, City,State)___] : at [unfilled] [Verbal consent obtained from patient] : the patient, [unfilled] [FreeTextEntry1] : TELEMEDICINE -- HPI FOR FOLLOW UP APPOINTMENT\par \par The patient-doctor relationship has been established in a face to face fashion via real time video/audio HIPAA compliant communication using telemedicine software. The patient's identity has been confirmed. The patient was previously emailed a copy of the telemedicine consent. They have had a chance to review and has now given verbal consent and has requested care to be assessed and treated through telemedicine and understands there maybe limitations in this process and they may need further follow up care in the office and or hospital settings.\par \par The patient denies fevers, chills, nausea and or vomiting and no unexplained weight loss.\par \par All pertinent parts of the patient PFSH (past medical, family and social histories), laboratory, radiological studies and physician notes were reviewed prior to starting the face to face portion of the telemedicine visit. Questionnaire results were discussed with patient.\par \par ==================================================================================================== \par \par MAUREEN CASTAÑEDA is a 50 year old male with a past medical history of DM and ED.\par \par  He has tried Sildenafil with no good results and Cialis with minimal results. Good libido. \par \par since our last visit tried to vacuum device but was not able to maintain a satisfactory erection\par \par 1/2020 PSA 1.15 ng/ml\par trimix inj with 0.2ml #5 last visit showed 20% erection\par \par has increase dose to 0.7ml and is able to have 70% erection -- still wishes for firmer erection. \par was supposed to increase the dosage but has not gotten a chance to\par \par

## 2020-07-19 NOTE — PHYSICAL EXAM
[General Appearance - Well Developed] : well developed [General Appearance - Well Nourished] : well nourished [Normal Appearance] : normal appearance [General Appearance - In No Acute Distress] : no acute distress [Well Groomed] : well groomed [Skin Color & Pigmentation] : normal skin color and pigmentation [] : no respiratory distress [Oriented To Time, Place, And Person] : oriented to person, place, and time [Exaggerated Use Of Accessory Muscles For Inspiration] : no accessory muscle use [Not Anxious] : not anxious [Mood] : the mood was normal [Affect] : the affect was normal

## 2020-10-12 ENCOUNTER — APPOINTMENT (OUTPATIENT)
Dept: UROLOGY | Facility: CLINIC | Age: 50
End: 2020-10-12
Payer: MEDICARE

## 2020-10-12 PROCEDURE — 99214 OFFICE O/P EST MOD 30 MIN: CPT

## 2020-10-12 RX ORDER — CEFADROXIL 500 MG/1
500 CAPSULE ORAL TWICE DAILY
Qty: 14 | Refills: 0 | Status: COMPLETED | COMMUNITY
Start: 2018-04-19 | End: 2020-10-12

## 2020-10-12 RX ORDER — MUPIROCIN 20 MG/G
2 OINTMENT TOPICAL
Qty: 60 | Refills: 6 | Status: COMPLETED | COMMUNITY
Start: 2017-06-15 | End: 2020-10-12

## 2020-10-12 RX ORDER — MUPIROCIN 20 MG/G
2 OINTMENT TOPICAL
Qty: 60 | Refills: 6 | Status: COMPLETED | COMMUNITY
Start: 2017-06-22 | End: 2020-10-12

## 2020-10-12 RX ORDER — PAPAVER/PHENTOLAMINE/ALPROSTAD 150-5-50
VIAL (EA) INTRACAVERNOSAL
Refills: 0 | Status: ACTIVE | COMMUNITY

## 2020-10-12 RX ORDER — SILVER SULFADIAZINE 10 MG/G
1 CREAM TOPICAL DAILY
Qty: 1 | Refills: 1 | Status: COMPLETED | COMMUNITY
Start: 2018-04-19 | End: 2020-10-12

## 2020-10-12 RX ORDER — TADALAFIL 20 MG/1
20 TABLET ORAL
Qty: 30 | Refills: 0 | Status: DISCONTINUED | COMMUNITY
Start: 2020-09-21

## 2020-10-12 RX ORDER — MUPIROCIN 20 MG/G
2 OINTMENT TOPICAL
Qty: 60 | Refills: 6 | Status: COMPLETED | COMMUNITY
Start: 2017-06-08 | End: 2020-10-12

## 2020-10-12 NOTE — ASSESSMENT
[FreeTextEntry1] : MAUREEN CASTAÑEDA is a 50 year old male with a past medical history of DM and ED.\par \par He has tried and failed both sildenafil and Cialis, as well as a vacuum device.\par \par He is currently managed on tri-mix formula #5. He is taken up to 1 cc which enabled him to obtain an erection with about 60% rigidity. He is currently not satisfied at this current dose.

## 2020-10-30 ENCOUNTER — APPOINTMENT (OUTPATIENT)
Dept: UROLOGY | Facility: CLINIC | Age: 50
End: 2020-10-30
Payer: MEDICARE

## 2020-10-30 VITALS — HEIGHT: 77 IN | TEMPERATURE: 94.4 F | BODY MASS INDEX: 25.98 KG/M2 | WEIGHT: 220 LBS

## 2020-10-30 PROCEDURE — 99213 OFFICE O/P EST LOW 20 MIN: CPT

## 2020-10-30 NOTE — ASSESSMENT
[FreeTextEntry1] : MAUREEN CASTAÑEDA is a 50 year old male with a past medical history of DM and ED.\par \par He has tried and failed both sildenafil and Cialis, as well as a vacuum device.\par \par He is currently managed on tri-mix formula #9. up to 40 units he is having a 80% erection which is better but he still wants to achieve better so will continue to increase the dosage\par \par We discussed priapism in detail and he understands present to the emergency room and/or contact the office it occurs. If he is not satisfied with the results he will consider IPP

## 2020-10-30 NOTE — PHYSICAL EXAM
[General Appearance - Well Developed] : well developed [General Appearance - Well Nourished] : well nourished [Normal Appearance] : normal appearance [Well Groomed] : well groomed [General Appearance - In No Acute Distress] : no acute distress [Skin Color & Pigmentation] : normal skin color and pigmentation [] : no respiratory distress [Exaggerated Use Of Accessory Muscles For Inspiration] : no accessory muscle use [Oriented To Time, Place, And Person] : oriented to person, place, and time [Affect] : the affect was normal [Mood] : the mood was normal [Not Anxious] : not anxious

## 2020-10-30 NOTE — HISTORY OF PRESENT ILLNESS
[FreeTextEntry1] : MAUREEN CASTAÑEDA is a 50 year old male with a past medical history of DM and ED.\par \par He has tried and failed both sildenafil and Cialis, as well as a vacuum device.\par \par He is currently managed on tri-mix formula #9. up to 40 units he is having a 80% erection which is better but he still wants to achieve better so will continue to increase the dosage\par \par We discussed priapism in detail and he understands present to the emergency room and/or contact the office it occurs. If he is not satisfied with the results he will consider IPP\par

## 2020-11-27 ENCOUNTER — APPOINTMENT (OUTPATIENT)
Dept: UROLOGY | Facility: CLINIC | Age: 50
End: 2020-11-27
Payer: MEDICARE

## 2020-11-27 PROCEDURE — 99441: CPT | Mod: 95

## 2020-11-27 NOTE — HISTORY OF PRESENT ILLNESS
[Medical Office: ([facility])___] : at the medical office located in  [Home] : at home, [unfilled] , at the time of the visit. [Other Location: e.g. Home (Enter Location, City,State)___] : at [unfilled] [Verbal consent obtained from patient] : the patient, [unfilled] [FreeTextEntry1] : Telephonic visit -- [facility] FOR FOLLOW UP APPOINTMENT\par \par The patient-doctor relationship has been established in an audio HIPAA compliant communication using telemedicine software. The patient's identity has been confirmed. The patient was previously emailed a copy of the consent. They have had a chance to review and has now given verbal consent and has requested care to be assessed and treated through telephone and understands there maybe limitations in this process and they may need further follow up care in the office and or hospital settings.\par \par The patient denies fevers, chills, nausea and or vomiting and no unexplained weight loss.\par \par All pertinent parts of the patient PFSH (past medical, family and social histories), laboratory, radiological studies and physician notes were reviewed prior to starting the visit. Questionnaire results were discussed with patient.\par \par ==================================================================================================== \par \par MAUREEN CASTAÑEDA is a 50 year old male with a past medical history of DM and ED.\par \par He has tried and failed both sildenafil and Cialis, as well as a vacuum device.\par \par He is currently managed on tri-mix formula #9. up to 40 units he is having a 80% erection which is better but he still wants to achieve better so will continue to increase the dosage\par \par We discussed priapism in detail and he understands present to the emergency room and/or contact the office it occurs. If he is not satisfied with the results he will consider IPP. \par \par \par phone number [phone]\par email of [email or web address]

## 2020-11-27 NOTE — ASSESSMENT
[FreeTextEntry1] : MAUREEN CASTAÑEDA is a 50 year old male with a past medical history of DM and ED.\par \par He has tried and failed both sildenafil and Cialis, as well as a vacuum device.\par \par He is currently managed on tri-mix formula #9. up to 40 units he is having a 80% erection which is better but he still wants to achieve better so will continue to increase the dosage\par \par We discussed priapism in detail and he understands present to the emergency room and/or contact the office it occurs. If he is not satisfied with the results he will consider IPP.

## 2020-12-01 PROCEDURE — G9005: CPT

## 2020-12-21 ENCOUNTER — EMERGENCY (EMERGENCY)
Facility: HOSPITAL | Age: 50
LOS: 0 days | Discharge: HOME | End: 2020-12-21
Attending: EMERGENCY MEDICINE | Admitting: EMERGENCY MEDICINE
Payer: MEDICARE

## 2020-12-21 VITALS
SYSTOLIC BLOOD PRESSURE: 205 MMHG | WEIGHT: 240.08 LBS | DIASTOLIC BLOOD PRESSURE: 108 MMHG | HEIGHT: 77 IN | RESPIRATION RATE: 18 BRPM | TEMPERATURE: 99 F | HEART RATE: 78 BPM | OXYGEN SATURATION: 100 %

## 2020-12-21 DIAGNOSIS — Z98.890 OTHER SPECIFIED POSTPROCEDURAL STATES: Chronic | ICD-10-CM

## 2020-12-21 DIAGNOSIS — E11.9 TYPE 2 DIABETES MELLITUS WITHOUT COMPLICATIONS: ICD-10-CM

## 2020-12-21 DIAGNOSIS — M25.521 PAIN IN RIGHT ELBOW: ICD-10-CM

## 2020-12-21 DIAGNOSIS — I10 ESSENTIAL (PRIMARY) HYPERTENSION: ICD-10-CM

## 2020-12-21 DIAGNOSIS — M25.529 PAIN IN UNSPECIFIED ELBOW: ICD-10-CM

## 2020-12-21 PROCEDURE — 99283 EMERGENCY DEPT VISIT LOW MDM: CPT

## 2020-12-21 PROCEDURE — 73080 X-RAY EXAM OF ELBOW: CPT | Mod: 26,RT

## 2020-12-21 RX ORDER — KETOROLAC TROMETHAMINE 30 MG/ML
30 SYRINGE (ML) INJECTION ONCE
Refills: 0 | Status: DISCONTINUED | OUTPATIENT
Start: 2020-12-21 | End: 2020-12-21

## 2020-12-21 NOTE — ED PROVIDER NOTE - PHYSICAL EXAMINATION
Physical Exam    Vital Signs: I have reviewed the initial vital signs.  Constitutional: well-nourished, appears stated age, no acute distress  Eyes: Conjunctiva pink, Sclera clear,  Cardiovascular: S1 and S2, regular rate, regular rhythm, well-perfused extremities, radial pulses equal and 2+  Musculoskeletal: supple neck, no lower extremity edema, no midline tenderness, right elbow ttp mild rom intact  Integumentary: warm, dry, no rash  Neurologic: awake, alert, nvi

## 2020-12-21 NOTE — ED PROVIDER NOTE - OBJECTIVE STATEMENT
49 yo male, pmh of htn and dm not complient with meds, presents to ed for right elbow pain, x 4 weeks, mild, aching, no radiation, hurt while in gym. denies fever chills, cp, numbness., tingling.

## 2020-12-21 NOTE — ED PROVIDER NOTE - CARE PROVIDER_API CALL
Serjio Cortes  ORTHOPAEDIC SURGERY  [address] [place] [address]  Phone: [phone]  Fax: [phone]  Follow Up Time: 1-3 Days

## 2020-12-21 NOTE — ED PROVIDER NOTE - PATIENT PORTAL LINK FT
You can access the FollowMyHealth Patient Portal offered by [facility] by registering at the following website: [email or web address]. By joining [employer]’s FollowMyHealth portal, you will also be able to view your health information using other applications (apps) compatible with our system.

## 2020-12-21 NOTE — ED PROVIDER NOTE - NSFOLLOWUPINSTRUCTIONS_ED_ALL_ED_FT
Elbow Sprain    WHAT YOU NEED TO KNOW:    An elbow sprain is caused by a stretched or torn ligament in the elbow joint. Ligaments are the strong tissues that connect bones.    DISCHARGE INSTRUCTIONS:    Return to the emergency department if:     The skin of your injured arm looks bluish or pale (less color than normal).      You have new or increased numbness in your injured arm.    Contact your healthcare provider if:     You have increased swelling and pain in your elbow.      You have new or increased stiffness or trouble moving your injured arm.      You have questions or concerns about your condition or care.    Medicines:     Prescription pain medicine may be given. Ask how to take this medicine safely.      Take your medicine as directed. Contact your healthcare provider if you think your medicine is not helping or if you have side effects. Tell him or her if you are allergic to any medicine. Keep a list of the medicines, vitamins, and herbs you take. Include the amounts, and when and why you take them. Bring the list or the pill bottles to follow-up visits. Carry your medicine list with you in case of an emergency.    Rest your elbow: You will need to rest your elbow for 1 to 2 days after your injury. This will help decrease the risk of more damage to your elbow.    Ice your elbow: Apply ice on your elbow for 15 to 20 minutes every hour or as directed. Use an ice pack, or put crushed ice in a plastic bag. Cover it with a towel. Ice helps prevent tissue damage and decreases swelling and pain.    Compress your elbow: Compression provides support and helps decrease swelling and movement so your elbow can heal. You may be told to keep your elbow wrapped with a tight elastic bandage. Follow instructions about how to apply your bandage.    Elevate your elbow: Elevate your elbow above the level of your heart as often as you can. This will help decrease swelling and pain. Prop your elbow on pillows or blankets to keep it elevated comfortably.     Exercise your elbow: You should begin to exercise your arm in a few days, once you are able to move your elbow without pain. Exercises will help decrease stiffness and improve the strength of your arm. Ask your healthcare provider what kind of exercises you should do.    Prevent another elbow sprain:     Make sure you warm up and stretch before you exercise.      Do not exercise when you feel pain or you are tired.      Wear equipment to protect yourself when you play sports.      Stop exercising and playing sports if your symptoms from a past injury return.    Follow up with your healthcare provider within 1 week: Write down any questions you have so you remember to ask them in your follow-up visits.

## 2020-12-21 NOTE — ED ADULT NURSE NOTE - NSIMPLEMENTINTERV_GEN_ALL_ED
Implemented All Universal Safety Interventions:  [place] to call system. Call bell, personal items and telephone within reach. Instruct patient to call for assistance. Room bathroom lighting operational. Non-slip footwear when patient is off stretcher. Physically safe environment: no spills, clutter or unnecessary equipment. Stretcher in lowest position, wheels locked, appropriate side rails in place.

## 2020-12-21 NOTE — ED PROVIDER NOTE - ATTENDING CONTRIBUTION TO CARE
57 y/o male h/o HTN, DM c/o rt elbow pain x approx 1 month, sx are constant, worse with certain movements and position, better with rest, denies fever, tactile temp, chills, paresthesias, focal weakness, or other associated complaints at present. Pt denies recent heavy lifting or trauma.     Old chart reviewed.  I have reviewed and agree with the nursing note, except as documented in my note.    VSS, awake, alert, in NAD, no skin lacerations or abrasions. RUE; no shoulder, wrist or hand tenderness, elbow; mild swelling over lateral elbow with ttp same region, otherwise appears symmetrical, no gross deformity, crepitus, tenderness or snuffbox tenderness, no warmth, erythema, induration, fluctuance, lymphangitis or purulence, able to actively fully flex, extend supinate and pronate, no pain elicited over the lateral epicondyle with wrist extension against resistance or pain elicited over the medial epicondyle with wrist flexion against resistance, strength 5/5 on flexion and extension, distal biceps tendon palpable, no joint instability appreciated, subjective normal median, ulnar, radial and axillary sensation, 2+ radial pulse. 55 y/o male h/o HTN (non-compliant with diet / medication), DM c/o rt elbow pain x approx 1 month, sx are constant, worse with certain movements and position, better with rest, denies fever, tactile temp, chills, paresthesias, focal weakness, or other associated complaints at present. Pt denies recent heavy lifting or trauma.     Old chart reviewed.  I have reviewed and agree with the nursing note, except as documented in my note.    VSS, awake, alert, in NAD, no skin lacerations or abrasions. RUE; no shoulder, wrist or hand tenderness, elbow; mild swelling over lateral elbow with ttp same region, otherwise appears symmetrical, no gross deformity, crepitus, tenderness or snuffbox tenderness, no warmth, erythema, induration, fluctuance, lymphangitis or purulence, able to actively fully flex, extend supinate and pronate, no pain elicited over the lateral epicondyle with wrist extension against resistance or pain elicited over the medial epicondyle with wrist flexion against resistance, strength 5/5 on flexion and extension, distal biceps tendon palpable, no joint instability appreciated, subjective normal median, ulnar, radial and axillary sensation, 2+ radial pulse.    IMP: XR neg for fx, unlikely infectious or vascular etiology, recommend supportive care measures including RICE, outpt f/u next available appt for further evaluation and management. Elevated BP, asymptomatic, will f/u PMD.    The patient was given detailed return precautions and advised to return to the emergency department if any new symptoms developed, symptoms worsened or for any concerns. The patient was offered the opportunity to ask questions and verbalized that they understand the diagnosis and discharge instructions.

## 2021-01-01 ENCOUNTER — OUTPATIENT (OUTPATIENT)
Dept: OUTPATIENT SERVICES | Facility: HOSPITAL | Age: 51
LOS: 1 days | End: 2021-01-01
Payer: MEDICARE

## 2021-01-01 DIAGNOSIS — Z98.890 OTHER SPECIFIED POSTPROCEDURAL STATES: Chronic | ICD-10-CM

## 2021-01-04 ENCOUNTER — NON-APPOINTMENT (OUTPATIENT)
Age: 51
End: 2021-01-04

## 2021-02-05 LAB
ESTIMATED AVERAGE GLUCOSE: 120 MG/DL
HBA1C MFR BLD HPLC: 5.8 %

## 2021-02-14 DIAGNOSIS — Z71.89 OTHER SPECIFIED COUNSELING: ICD-10-CM

## 2021-02-17 ENCOUNTER — OUTPATIENT (OUTPATIENT)
Dept: OUTPATIENT SERVICES | Facility: HOSPITAL | Age: 51
LOS: 1 days | Discharge: HOME | End: 2021-02-17
Payer: MEDICARE

## 2021-02-17 VITALS
HEIGHT: 77 IN | HEART RATE: 90 BPM | TEMPERATURE: 98 F | SYSTOLIC BLOOD PRESSURE: 194 MMHG | DIASTOLIC BLOOD PRESSURE: 113 MMHG | WEIGHT: 240.08 LBS | RESPIRATION RATE: 16 BRPM | OXYGEN SATURATION: 100 %

## 2021-02-17 DIAGNOSIS — Z01.818 ENCOUNTER FOR OTHER PREPROCEDURAL EXAMINATION: ICD-10-CM

## 2021-02-17 DIAGNOSIS — Z98.890 OTHER SPECIFIED POSTPROCEDURAL STATES: Chronic | ICD-10-CM

## 2021-02-17 DIAGNOSIS — N52.01 ERECTILE DYSFUNCTION DUE TO ARTERIAL INSUFFICIENCY: ICD-10-CM

## 2021-02-17 LAB
A1C WITH ESTIMATED AVERAGE GLUCOSE RESULT: 6 % — HIGH (ref 4–5.6)
ALBUMIN SERPL ELPH-MCNC: 4.7 G/DL — SIGNIFICANT CHANGE UP (ref 3.5–5.2)
ALP SERPL-CCNC: 83 U/L — SIGNIFICANT CHANGE UP (ref 30–115)
ALT FLD-CCNC: 24 U/L — SIGNIFICANT CHANGE UP (ref 0–41)
ANION GAP SERPL CALC-SCNC: 11 MMOL/L — SIGNIFICANT CHANGE UP (ref 7–14)
APPEARANCE UR: CLEAR — SIGNIFICANT CHANGE UP
APTT BLD: 36 SEC — SIGNIFICANT CHANGE UP (ref 27–39.2)
AST SERPL-CCNC: 20 U/L — SIGNIFICANT CHANGE UP (ref 0–41)
BACTERIA # UR AUTO: NEGATIVE — SIGNIFICANT CHANGE UP
BASOPHILS # BLD AUTO: 0.04 K/UL — SIGNIFICANT CHANGE UP (ref 0–0.2)
BASOPHILS NFR BLD AUTO: 0.7 % — SIGNIFICANT CHANGE UP (ref 0–1)
BILIRUB SERPL-MCNC: 0.4 MG/DL — SIGNIFICANT CHANGE UP (ref 0.2–1.2)
BILIRUB UR-MCNC: NEGATIVE — SIGNIFICANT CHANGE UP
BUN SERPL-MCNC: 22 MG/DL — HIGH (ref 10–20)
CALCIUM SERPL-MCNC: 9.3 MG/DL — SIGNIFICANT CHANGE UP (ref 8.5–10.1)
CHLORIDE SERPL-SCNC: 104 MMOL/L — SIGNIFICANT CHANGE UP (ref 98–110)
CO2 SERPL-SCNC: 26 MMOL/L — SIGNIFICANT CHANGE UP (ref 17–32)
COLOR SPEC: YELLOW — SIGNIFICANT CHANGE UP
CREAT SERPL-MCNC: 1.3 MG/DL — SIGNIFICANT CHANGE UP (ref 0.7–1.5)
DIFF PNL FLD: SIGNIFICANT CHANGE UP
EOSINOPHIL # BLD AUTO: 0.06 K/UL — SIGNIFICANT CHANGE UP (ref 0–0.7)
EOSINOPHIL NFR BLD AUTO: 1 % — SIGNIFICANT CHANGE UP (ref 0–8)
EPI CELLS # UR: 0 /HPF — SIGNIFICANT CHANGE UP (ref 0–5)
ESTIMATED AVERAGE GLUCOSE: 126 MG/DL — HIGH (ref 68–114)
GLUCOSE SERPL-MCNC: 99 MG/DL — SIGNIFICANT CHANGE UP (ref 70–99)
GLUCOSE UR QL: NEGATIVE — SIGNIFICANT CHANGE UP
HCT VFR BLD CALC: 42.6 % — SIGNIFICANT CHANGE UP (ref 42–52)
HGB BLD-MCNC: 12.7 G/DL — LOW (ref 14–18)
HYALINE CASTS # UR AUTO: 2 /LPF — SIGNIFICANT CHANGE UP (ref 0–7)
IMM GRANULOCYTES NFR BLD AUTO: 0.2 % — SIGNIFICANT CHANGE UP (ref 0.1–0.3)
INR BLD: 1.03 RATIO — SIGNIFICANT CHANGE UP (ref 0.65–1.3)
KETONES UR-MCNC: SIGNIFICANT CHANGE UP
LEUKOCYTE ESTERASE UR-ACNC: NEGATIVE — SIGNIFICANT CHANGE UP
LYMPHOCYTES # BLD AUTO: 2.22 K/UL — SIGNIFICANT CHANGE UP (ref 1.2–3.4)
LYMPHOCYTES # BLD AUTO: 38.5 % — SIGNIFICANT CHANGE UP (ref 20.5–51.1)
MCHC RBC-ENTMCNC: 23.1 PG — LOW (ref 27–31)
MCHC RBC-ENTMCNC: 29.8 G/DL — LOW (ref 32–37)
MCV RBC AUTO: 77.6 FL — LOW (ref 80–94)
MONOCYTES # BLD AUTO: 0.62 K/UL — HIGH (ref 0.1–0.6)
MONOCYTES NFR BLD AUTO: 10.7 % — HIGH (ref 1.7–9.3)
NEUTROPHILS # BLD AUTO: 2.82 K/UL — SIGNIFICANT CHANGE UP (ref 1.4–6.5)
NEUTROPHILS NFR BLD AUTO: 48.9 % — SIGNIFICANT CHANGE UP (ref 42.2–75.2)
NITRITE UR-MCNC: NEGATIVE — SIGNIFICANT CHANGE UP
NRBC # BLD: 0 /100 WBCS — SIGNIFICANT CHANGE UP (ref 0–0)
PH UR: 6 — SIGNIFICANT CHANGE UP (ref 5–8)
PLATELET # BLD AUTO: 243 K/UL — SIGNIFICANT CHANGE UP (ref 130–400)
POTASSIUM SERPL-MCNC: 4 MMOL/L — SIGNIFICANT CHANGE UP (ref 3.5–5)
POTASSIUM SERPL-SCNC: 4 MMOL/L — SIGNIFICANT CHANGE UP (ref 3.5–5)
PROT SERPL-MCNC: 7.3 G/DL — SIGNIFICANT CHANGE UP (ref 6–8)
PROT UR-MCNC: ABNORMAL
PROTHROM AB SERPL-ACNC: 11.8 SEC — SIGNIFICANT CHANGE UP (ref 9.95–12.87)
RBC # BLD: 5.49 M/UL — SIGNIFICANT CHANGE UP (ref 4.7–6.1)
RBC # FLD: 13.1 % — SIGNIFICANT CHANGE UP (ref 11.5–14.5)
RBC CASTS # UR COMP ASSIST: 6 /HPF — HIGH (ref 0–4)
SODIUM SERPL-SCNC: 141 MMOL/L — SIGNIFICANT CHANGE UP (ref 135–146)
SP GR SPEC: 1.03 — SIGNIFICANT CHANGE UP (ref 1.01–1.03)
UROBILINOGEN FLD QL: SIGNIFICANT CHANGE UP
WBC # BLD: 5.77 K/UL — SIGNIFICANT CHANGE UP (ref 4.8–10.8)
WBC # FLD AUTO: 5.77 K/UL — SIGNIFICANT CHANGE UP (ref 4.8–10.8)
WBC UR QL: 1 /HPF — SIGNIFICANT CHANGE UP (ref 0–5)

## 2021-02-17 PROCEDURE — 93010 ELECTROCARDIOGRAM REPORT: CPT

## 2021-02-17 RX ORDER — METFORMIN HYDROCHLORIDE 850 MG/1
0 TABLET ORAL
Qty: 0 | Refills: 0 | DISCHARGE

## 2021-02-17 NOTE — H&P PST ADULT - NSANTHOSAYNRD_GEN_A_CORE
No. MOLLY screening performed.  STOP BANG Legend: 0-2 = LOW Risk; 3-4 = INTERMEDIATE Risk; 5-8 = HIGH Risk

## 2021-02-17 NOTE — H&P PST ADULT - CENTRAL VENOUS CATHETER
Called pt back and she will wear short sleeves and bring her epi-pen to appt.  
Patient called would like to speak with the nurse regarding her challenge she has this afternoon, she is wondering where the injections will take place and does she need to wear special clothing for it. She would like a call back at [phone]  
no

## 2021-02-17 NOTE — H&P PST ADULT - NSICDXPASTMEDICALHX_GEN_ALL_CORE_FT
PAST MEDICAL HISTORY:  Diabetes     Hypertension      PAST MEDICAL HISTORY:  Diabetes     Gallstones     History of penile disorder     Hypertension

## 2021-02-17 NOTE — H&P PST ADULT - HISTORY OF PRESENT ILLNESS
Pt complains of    Denies any chest pain, difficulty breathing, SOB, palpitations, dysuria, URI, or any other infections in the last 2 weeks/1 month. Denies any recent travel, contact, or exposure to any persons with known or suspected COVID-19. Pt also denies COVID testing within the last 2 weeks. Pt advised to self quarantine until day of procedure. Exercise tolerance of 2-3 flights of stairs without dyspnea. MOLLY reviewed with patient.    Anesthesia Alert  NO--Difficult Airway  NO--History of neck surgery or radiation  NO--Limited ROM of neck  NO--History of Malignant hyperthermia  NO--Personal or family history of Pseudocholinesterase deficiency  NO--Prior Anesthesia Complication  NO--Latex Allergy  NO--Loose teeth  NO--History of Rheumatoid Arthritis  NO--MOLLY  NO--Other     written and verbal instructions with teach back on chlorhexidine shampoo provided,  pt verbalized understanding with returned demonstration   Pt complains of erectal dysfunction for three years.    Denies any chest pain, difficulty breathing, SOB, palpitations, dysuria, URI, or any other infections in the last 2 weeks/1 month. Denies any recent travel, contact, or exposure to any persons with known or suspected COVID-19. Pt also denies COVID testing within the last 2 weeks. Pt advised to self quarantine until day of procedure. Exercise tolerance of 2-3 flights of stairs without dyspnea. MOLLY reviewed with patient.    Anesthesia Alert  NO--Difficult Airway  NO--History of neck surgery or radiation  NO--Limited ROM of neck  NO--History of Malignant hyperthermia  NO--Personal or family history of Pseudocholinesterase deficiency  NO--Prior Anesthesia Complication  NO--Latex Allergy  NO--Loose teeth  NO--History of Rheumatoid Arthritis  NO--MOLLY  NO--Other     written and verbal instructions with teach back on chlorhexidine shampoo provided,  pt verbalized understanding with returned demonstration    Please note: Pt's Blood pressure in PAST was 189/105 (left arm)  194/113 (right arm), Pt advised to call dr Mcgill for follow up. Diet, exercise, medications discussed during visit. Pt denies any symptoms

## 2021-02-17 NOTE — H&P PST ADULT - ASSESSMENT
GEN: NAD  Neuro: A&Ox3.  No focal deficits.  Moving all extremities.   HEENT: No obvious abnormalities  CV: S1S2, regular, no murmurs appreciated.  No carotid bruits.  No JVD  Lungs: Clear B/L.  No wheezing, rales or rhonchi  ABD: Soft, non-tender, + distended.  +Bowel sounds  EXT: Warm and well perfused.  No peripheral edema noted  PV: Pedal pulses palpable

## 2021-02-19 LAB
CULTURE RESULTS: SIGNIFICANT CHANGE UP
SPECIMEN SOURCE: SIGNIFICANT CHANGE UP

## 2021-02-22 ENCOUNTER — OUTPATIENT (OUTPATIENT)
Dept: OUTPATIENT SERVICES | Facility: HOSPITAL | Age: 51
LOS: 1 days | Discharge: HOME | End: 2021-02-22

## 2021-02-22 ENCOUNTER — LABORATORY RESULT (OUTPATIENT)
Age: 51
End: 2021-02-22

## 2021-02-22 DIAGNOSIS — Z11.59 ENCOUNTER FOR SCREENING FOR OTHER VIRAL DISEASES: ICD-10-CM

## 2021-02-22 DIAGNOSIS — Z98.890 OTHER SPECIFIED POSTPROCEDURAL STATES: Chronic | ICD-10-CM

## 2021-02-22 PROBLEM — Z87.438 PERSONAL HISTORY OF OTHER DISEASES OF MALE GENITAL ORGANS: Chronic | Status: ACTIVE | Noted: 2021-02-17

## 2021-02-22 PROBLEM — K80.20 CALCULUS OF GALLBLADDER WITHOUT CHOLECYSTITIS WITHOUT OBSTRUCTION: Chronic | Status: ACTIVE | Noted: 2021-02-17

## 2021-02-25 ENCOUNTER — OUTPATIENT (OUTPATIENT)
Dept: OUTPATIENT SERVICES | Facility: HOSPITAL | Age: 51
LOS: 1 days | Discharge: HOME | End: 2021-02-25
Payer: MEDICARE

## 2021-02-25 ENCOUNTER — APPOINTMENT (OUTPATIENT)
Dept: UROLOGY | Facility: HOSPITAL | Age: 51
End: 2021-02-25

## 2021-02-25 VITALS — DIASTOLIC BLOOD PRESSURE: 117 MMHG | HEART RATE: 124 BPM | OXYGEN SATURATION: 99 % | SYSTOLIC BLOOD PRESSURE: 198 MMHG

## 2021-02-25 VITALS
SYSTOLIC BLOOD PRESSURE: 169 MMHG | DIASTOLIC BLOOD PRESSURE: 84 MMHG | HEART RATE: 98 BPM | TEMPERATURE: 98 F | RESPIRATION RATE: 17 BRPM | OXYGEN SATURATION: 98 %

## 2021-02-25 DIAGNOSIS — Z98.890 OTHER SPECIFIED POSTPROCEDURAL STATES: Chronic | ICD-10-CM

## 2021-02-25 LAB — GLUCOSE BLDC GLUCOMTR-MCNC: 159 MG/DL — HIGH (ref 70–99)

## 2021-02-25 PROCEDURE — 54405 INSERT MULTI-COMP PENIS PROS: CPT

## 2021-02-25 RX ORDER — ACETAMINOPHEN 325 MG/1
325 TABLET ORAL EVERY 6 HOURS
Qty: 168 | Refills: 0 | Status: ACTIVE | COMMUNITY
Start: 2021-02-25 | End: 1900-01-01

## 2021-02-25 RX ORDER — VANCOMYCIN HCL 1 G
1000 VIAL (EA) INTRAVENOUS ONCE
Refills: 0 | Status: COMPLETED | OUTPATIENT
Start: 2021-02-25 | End: 2021-02-25

## 2021-02-25 RX ORDER — CELECOXIB 200 MG/1
200 CAPSULE ORAL DAILY
Qty: 10 | Refills: 0 | Status: ACTIVE | COMMUNITY
Start: 2021-02-25 | End: 1900-01-01

## 2021-02-25 RX ORDER — HYDROCHLOROTHIAZIDE 25 MG
12.5 TABLET ORAL ONCE
Refills: 0 | Status: COMPLETED | OUTPATIENT
Start: 2021-02-25 | End: 2021-02-25

## 2021-02-25 RX ORDER — OXYCODONE AND ACETAMINOPHEN 5; 325 MG/1; MG/1
2 TABLET ORAL ONCE
Refills: 0 | Status: DISCONTINUED | OUTPATIENT
Start: 2021-02-25 | End: 2021-02-25

## 2021-02-25 RX ORDER — SULFAMETHOXAZOLE AND TRIMETHOPRIM 800; 160 MG/1; MG/1
800-160 TABLET ORAL
Qty: 14 | Refills: 0 | Status: ACTIVE | COMMUNITY
Start: 2021-02-25 | End: 1900-01-01

## 2021-02-25 RX ORDER — ACETAMINOPHEN 500 MG
975 TABLET ORAL ONCE
Refills: 0 | Status: COMPLETED | OUTPATIENT
Start: 2021-02-25 | End: 2021-02-25

## 2021-02-25 RX ORDER — CELECOXIB 200 MG/1
200 CAPSULE ORAL ONCE
Refills: 0 | Status: COMPLETED | OUTPATIENT
Start: 2021-02-25 | End: 2021-02-25

## 2021-02-25 RX ORDER — GABAPENTIN 300 MG/1
300 CAPSULE ORAL 3 TIMES DAILY
Qty: 30 | Refills: 0 | Status: ACTIVE | COMMUNITY
Start: 2021-02-25 | End: 1900-01-01

## 2021-02-25 RX ORDER — ONDANSETRON 8 MG/1
4 TABLET, FILM COATED ORAL ONCE
Refills: 0 | Status: DISCONTINUED | OUTPATIENT
Start: 2021-02-25 | End: 2021-02-25

## 2021-02-25 RX ORDER — OXYCODONE AND ACETAMINOPHEN 5; 325 MG/1; MG/1
1 TABLET ORAL ONCE
Refills: 0 | Status: DISCONTINUED | OUTPATIENT
Start: 2021-02-25 | End: 2021-02-25

## 2021-02-25 RX ORDER — OXYCODONE 5 MG/1
5 TABLET ORAL
Qty: 20 | Refills: 0 | Status: ACTIVE | COMMUNITY
Start: 2021-02-25 | End: 1900-01-01

## 2021-02-25 RX ORDER — MEPERIDINE HYDROCHLORIDE 50 MG/ML
12.5 INJECTION INTRAMUSCULAR; INTRAVENOUS; SUBCUTANEOUS
Refills: 0 | Status: DISCONTINUED | OUTPATIENT
Start: 2021-02-25 | End: 2021-02-25

## 2021-02-25 RX ORDER — HYDROMORPHONE HYDROCHLORIDE 2 MG/ML
1 INJECTION INTRAMUSCULAR; INTRAVENOUS; SUBCUTANEOUS
Refills: 0 | Status: DISCONTINUED | OUTPATIENT
Start: 2021-02-25 | End: 2021-02-25

## 2021-02-25 RX ORDER — LOSARTAN POTASSIUM 100 MG/1
50 TABLET, FILM COATED ORAL DAILY
Refills: 0 | Status: COMPLETED | OUTPATIENT
Start: 2021-02-25 | End: 2021-02-25

## 2021-02-25 RX ORDER — GABAPENTIN 400 MG/1
300 CAPSULE ORAL ONCE
Refills: 0 | Status: COMPLETED | OUTPATIENT
Start: 2021-02-25 | End: 2021-02-25

## 2021-02-25 RX ORDER — DOCUSATE SODIUM 100 MG/1
100 CAPSULE ORAL 3 TIMES DAILY
Qty: 90 | Refills: 0 | Status: ACTIVE | COMMUNITY
Start: 2021-02-25 | End: 1900-01-01

## 2021-02-25 RX ORDER — HYDROMORPHONE HYDROCHLORIDE 2 MG/ML
0.5 INJECTION INTRAMUSCULAR; INTRAVENOUS; SUBCUTANEOUS
Refills: 0 | Status: DISCONTINUED | OUTPATIENT
Start: 2021-02-25 | End: 2021-02-25

## 2021-02-25 RX ORDER — SODIUM CHLORIDE 9 MG/ML
1000 INJECTION, SOLUTION INTRAVENOUS
Refills: 0 | Status: DISCONTINUED | OUTPATIENT
Start: 2021-02-25 | End: 2021-02-25

## 2021-02-25 RX ADMIN — Medication 250 MILLIGRAM(S): at 13:07

## 2021-02-25 RX ADMIN — GABAPENTIN 300 MILLIGRAM(S): 400 CAPSULE ORAL at 12:27

## 2021-02-25 RX ADMIN — LOSARTAN POTASSIUM 50 MILLIGRAM(S): 100 TABLET, FILM COATED ORAL at 12:38

## 2021-02-25 RX ADMIN — Medication 12.5 MILLIGRAM(S): at 12:38

## 2021-02-25 RX ADMIN — SODIUM CHLORIDE 125 MILLILITER(S): 9 INJECTION, SOLUTION INTRAVENOUS at 16:40

## 2021-02-25 RX ADMIN — Medication 975 MILLIGRAM(S): at 12:27

## 2021-02-25 RX ADMIN — CELECOXIB 200 MILLIGRAM(S): 200 CAPSULE ORAL at 12:27

## 2021-02-25 NOTE — HISTORY OF PRESENT ILLNESS
[FreeTextEntry1] : 26cm coloplast titan device with 3cm bilateral RTE\par 125ml in right side reservoir\par plan for removal of JAVI tomorrow \par no valera catheter in place

## 2021-02-25 NOTE — ASU DISCHARGE PLAN (ADULT/PEDIATRIC) - ASU DC SPECIAL INSTRUCTIONSFT
Scripts already sent from office. Take your antibiotics as prescribed.    Follow up with Dr Young as scheduled tomorrow for drain removal.

## 2021-02-26 ENCOUNTER — APPOINTMENT (OUTPATIENT)
Dept: UROLOGY | Facility: CLINIC | Age: 51
End: 2021-02-26
Payer: MEDICARE

## 2021-02-26 PROCEDURE — 99024 POSTOP FOLLOW-UP VISIT: CPT

## 2021-02-26 NOTE — HISTORY OF PRESENT ILLNESS
[FreeTextEntry1] : IPP yesterday\par doing well\par JAVI removed\par voiding easily\par no signs of infection\par he will remove dressing tomorrow\par follow up in one week to deflate implant and wound check

## 2021-03-02 DIAGNOSIS — I10 ESSENTIAL (PRIMARY) HYPERTENSION: ICD-10-CM

## 2021-03-02 DIAGNOSIS — E11.69 TYPE 2 DIABETES MELLITUS WITH OTHER SPECIFIED COMPLICATION: ICD-10-CM

## 2021-03-02 DIAGNOSIS — N52.01 ERECTILE DYSFUNCTION DUE TO ARTERIAL INSUFFICIENCY: ICD-10-CM

## 2021-03-02 DIAGNOSIS — Z79.4 LONG TERM (CURRENT) USE OF INSULIN: ICD-10-CM

## 2021-03-03 ENCOUNTER — OUTPATIENT (OUTPATIENT)
Dept: OUTPATIENT SERVICES | Facility: HOSPITAL | Age: 51
LOS: 1 days | Discharge: HOME | End: 2021-03-03

## 2021-03-03 ENCOUNTER — LABORATORY RESULT (OUTPATIENT)
Age: 51
End: 2021-03-03

## 2021-03-03 ENCOUNTER — APPOINTMENT (OUTPATIENT)
Dept: PODIATRY | Facility: CLINIC | Age: 51
End: 2021-03-03
Payer: MEDICARE

## 2021-03-03 DIAGNOSIS — R23.4 CHANGES IN SKIN TEXTURE: ICD-10-CM

## 2021-03-03 DIAGNOSIS — Z98.890 OTHER SPECIFIED POSTPROCEDURAL STATES: Chronic | ICD-10-CM

## 2021-03-03 PROCEDURE — 11755 BIOPSY NAIL UNIT: CPT

## 2021-03-03 PROCEDURE — 99213 OFFICE O/P EST LOW 20 MIN: CPT | Mod: 25

## 2021-03-03 RX ORDER — AMMONIUM LACTATE 12 %
12 CREAM (GRAM) TOPICAL TWICE DAILY
Qty: 1 | Refills: 3 | Status: ACTIVE | COMMUNITY
Start: 2021-03-03 | End: 1900-01-01

## 2021-03-03 NOTE — HISTORY OF PRESENT ILLNESS
[FreeTextEntry1] : 50 year old M  presents for a diabetic foot evaluation. Mr. CASTAÑEDA relates intermittent stiffness in his toes. Last A1c was 6% 2/2020. Has secondary complaint of nail dystrophy. \par

## 2021-03-03 NOTE — ASSESSMENT
[FreeTextEntry1] : -Loss of protective sensation: yes  \par -Presence of foot deformity:  yes   \par -presence of pre-ulcerative lesion: yes\par   -hemorrhage into callus: yes \par -atrophy of heel or metatarsal fat pads: no\par \par -Diabetic neurovascular foot assessment  performed. \par -Discussed with patient diabetic foot hygiene.Patient instructed to regularly check the bottom of the feet\par -nail biopsy taken of the right hallux nail and sent for path\par -Rx ammonium lactate\par -patient would benefit from custom molded inserts to support the medial longitudinal arch and limit overpronation.\par -return 6 months\par \par

## 2021-03-03 NOTE — PHYSICAL EXAM
[General Appearance - Alert] : alert [General Appearance - In No Acute Distress] : in no acute distress [1+] : left foot dorsalis pedis 1+ [Pes Planus] : pes planus deformity [Vibration Dec.] : diminished vibratory sensation at the level of the toes [Oriented To Time, Place, And Person] : oriented to person, place, and time [Impaired Insight] : insight and judgment were intact [Ankle Swelling (On Exam)] : not present [Varicose Veins Of Lower Extremities] : not present [] : not present [de-identified] : b/l bunion deformity, right 2nd hyperextension of toe at the MPJ [FreeTextEntry1] : dry scaling skin, \par thick, dystrophic, discolored nails with subungual debris x 10\par deep skin fissuring b/l heels\par  [Diminished Throughout Right Foot] : normal sensation with monofilament testing throughout right foot [Diminished Throughout Left Foot] : normal sensation with monofilament testing throughout left foot

## 2021-03-03 NOTE — REVIEW OF SYSTEMS
[Joint Stiffness] : joint stiffness [Skin Lesions] : skin lesion [Dry Skin] : dry skin [Negative] : Constitutional

## 2021-03-05 ENCOUNTER — APPOINTMENT (OUTPATIENT)
Dept: UROLOGY | Facility: CLINIC | Age: 51
End: 2021-03-05
Payer: MEDICARE

## 2021-03-05 DIAGNOSIS — R30.0 DYSURIA: ICD-10-CM

## 2021-03-05 PROCEDURE — 99212 OFFICE O/P EST SF 10 MIN: CPT | Mod: 24

## 2021-03-05 RX ORDER — AMOXICILLIN AND CLAVULANATE POTASSIUM 875; 125 MG/1; MG/1
875-125 TABLET, COATED ORAL
Qty: 10 | Refills: 0 | Status: ACTIVE | COMMUNITY
Start: 2021-03-05 | End: 1900-01-01

## 2021-03-08 ENCOUNTER — APPOINTMENT (OUTPATIENT)
Dept: UROLOGY | Facility: CLINIC | Age: 51
End: 2021-03-08
Payer: MEDICARE

## 2021-03-08 DIAGNOSIS — Z45.89 ENCOUNTER FOR ADJUSTMENT AND MANAGEMENT OF OTHER IMPLANTED DEVICES: ICD-10-CM

## 2021-03-08 DIAGNOSIS — E11.42 TYPE 2 DIABETES MELLITUS WITH DIABETIC POLYNEUROPATHY: ICD-10-CM

## 2021-03-08 DIAGNOSIS — M21.611 BUNION OF RIGHT FOOT: ICD-10-CM

## 2021-03-08 DIAGNOSIS — R23.4 CHANGES IN SKIN TEXTURE: ICD-10-CM

## 2021-03-08 DIAGNOSIS — B35.1 TINEA UNGUIUM: ICD-10-CM

## 2021-03-08 PROCEDURE — 99213 OFFICE O/P EST LOW 20 MIN: CPT | Mod: 24

## 2021-03-08 NOTE — HISTORY OF PRESENT ILLNESS
[FreeTextEntry1] : IPP two weeks ago\par doing well\par \par voiding easily\par no signs of infection\par \par presents for adjustment of penile implant -- states that he feels it is inflated and denies inflating it himself\par \par on exam the penile implant was only partially inflated -- he didn’t want me to bring it down any more as it would be too uncomfortable \par

## 2021-03-12 PROBLEM — R30.0 DYSURIA: Status: ACTIVE | Noted: 2021-03-05

## 2021-03-12 NOTE — HISTORY OF PRESENT ILLNESS
[FreeTextEntry1] : This is a 50 year male who had IPP last week\par presents for wound check and to deflate the device -- adjustment and manipulation\par no signs of infection \par doing well\par \par follow up in 3 weeks to learn to inflate and deflate device

## 2021-03-19 RX ORDER — MUPIROCIN 20 MG/G
2 OINTMENT TOPICAL 3 TIMES DAILY
Qty: 1 | Refills: 1 | Status: ACTIVE | COMMUNITY
Start: 2021-03-19 | End: 1900-01-01

## 2021-03-26 ENCOUNTER — APPOINTMENT (OUTPATIENT)
Dept: UROLOGY | Facility: CLINIC | Age: 51
End: 2021-03-26
Payer: MEDICARE

## 2021-03-26 DIAGNOSIS — R21 RASH AND OTHER NONSPECIFIC SKIN ERUPTION: ICD-10-CM

## 2021-03-26 DIAGNOSIS — N48.89 OTHER SPECIFIED DISORDERS OF PENIS: ICD-10-CM

## 2021-03-26 PROCEDURE — 99024 POSTOP FOLLOW-UP VISIT: CPT

## 2021-03-26 RX ORDER — MICONAZOLE NITRATE 2 G/100G
2 CREAM TOPICAL TWICE DAILY
Qty: 1 | Refills: 2 | Status: ACTIVE | COMMUNITY
Start: 2021-03-26 | End: 1900-01-01

## 2021-03-26 RX ORDER — CEPHALEXIN 500 MG/1
500 TABLET ORAL 3 TIMES DAILY
Qty: 42 | Refills: 0 | Status: ACTIVE | COMMUNITY
Start: 2021-03-26 | End: 1900-01-01

## 2021-03-26 RX ORDER — IBUPROFEN 800 MG/1
800 TABLET, FILM COATED ORAL 3 TIMES DAILY
Qty: 30 | Refills: 0 | Status: ACTIVE | COMMUNITY
Start: 2021-03-26 | End: 1900-01-01

## 2021-03-26 NOTE — HISTORY OF PRESENT ILLNESS
[FreeTextEntry1] : IPP 4 weeks ago\par doing well\par overall better but continues to have some scrotal pump discomfort\par there is some fluid around the pump on palpation\par penis has not pain, testes without pain\par \par voiding easily\par \par no fevers or skin changes but will start antibiotics for two weeks with ibuprofen\par \par follow up in 2 weeks\par \par has penile rash asks for antifungal crean

## 2021-04-09 ENCOUNTER — APPOINTMENT (OUTPATIENT)
Dept: UROLOGY | Facility: CLINIC | Age: 51
End: 2021-04-09
Payer: MEDICARE

## 2021-04-09 DIAGNOSIS — Z45.89 ENCOUNTER FOR ADJUSTMENT AND MANAGEMENT OF OTHER IMPLANTED DEVICES: ICD-10-CM

## 2021-04-09 PROCEDURE — 99212 OFFICE O/P EST SF 10 MIN: CPT | Mod: 24

## 2021-04-21 PROBLEM — Z45.89 ENCOUNTER FOR ADJUSTMENT AND MANAGEMENT OF OTHER IMPLANTED DEVICES: Status: ACTIVE | Noted: 2021-03-08

## 2021-04-21 NOTE — ASSESSMENT
[FreeTextEntry1] : IPP 2 months ago\par Patient had rash last visit which cleared up with Miconazole antifungal cream.\par there is significant less fluid around pump. Patient has mild discomfort when attempting to deflate device despite antibiotics and ibuprofen. Discomfort is improved since last visit. \par penis has no pain, testes without pain\par \par voiding easily\par

## 2021-04-21 NOTE — PHYSICAL EXAM
[Normal Appearance] : normal appearance [Well Groomed] : well groomed [General Appearance - In No Acute Distress] : no acute distress [Penis Abnormality] : normal circumcised penis [Testes Tenderness] : no tenderness of the testes [Testes Mass (___cm)] : there were no testicular masses [Edema] : no peripheral edema [] : no respiratory distress [Oriented To Time, Place, And Person] : oriented to person, place, and time [Normal Station and Gait] : the gait and station were normal for the patient's age [FreeTextEntry1] : IPP palpated and is in right position. patient complains of pain in scrotum when attempting to deflate device

## 2021-04-21 NOTE — REVIEW OF SYSTEMS
[Fever] : no fever [Chills] : no chills [Chest Pain] : no chest pain [Shortness Of Breath] : no shortness of breath [Abdominal Pain] : no abdominal pain [Vomiting] : no vomiting [Constipation] : no constipation [Diarrhea] : no diarrhea [Confused] : no confusion [Dizziness] : no dizziness

## 2021-04-21 NOTE — HISTORY OF PRESENT ILLNESS
[FreeTextEntry1] : IPP 2 months ago\par Patient had rash last visit which cleared up with Miconazole antifungal cream.\par there is significant less fluid around pump. Patient has mild discomfort when attempting to deflate device despite antibiotics and ibuprofen. Discomfort is improved since last visit.  was shown how to manipulate and adjust device\par penis has no pain, testes without pain\par \par voiding easily\par \par

## 2021-04-29 NOTE — PROCEDURE NOTE - NSASSISTBY_GEN_A_CORE
Outreach attempt was made to schedule an Annual Wellness Visit. This was the first attempt. Contact was not made, left message.  
Attending/Leilani
Myself
Myself

## 2021-06-08 ENCOUNTER — APPOINTMENT (OUTPATIENT)
Dept: PODIATRY | Facility: CLINIC | Age: 51
End: 2021-06-08
Payer: MEDICARE

## 2021-06-08 VITALS
BODY MASS INDEX: 28.93 KG/M2 | WEIGHT: 245 LBS | HEART RATE: 97 BPM | DIASTOLIC BLOOD PRESSURE: 96 MMHG | SYSTOLIC BLOOD PRESSURE: 154 MMHG | HEIGHT: 77 IN | TEMPERATURE: 97.1 F

## 2021-06-08 PROCEDURE — 99213 OFFICE O/P EST LOW 20 MIN: CPT

## 2021-06-08 NOTE — ASSESSMENT
[FreeTextEntry1] : -Patient was placed in a sitting position. Appropriate plastic covers were placed on the patients feet. An STS plaster cast was then placed over the patients feet. Any wrinkling in the cast was removed. The foot was then placed in subtalar joint neutral. Weightbearing was simulated by pushing up on the lateral column and then first metatarsal phalangeal joint was hyperextended to create a medial longitudinal arch\par \par -Orthotic Rx: semi rigid orthotic. diabetic  topcover, Rearfoot post: Neutral, post to cast, full foot extension with poron 1/8th inch. reinforce medial arch\par -Patient will return in 3 weeks to  orthotics\par  no

## 2021-06-08 NOTE — PHYSICAL EXAM
[General Appearance - Alert] : alert [General Appearance - In No Acute Distress] : in no acute distress [1+] : left foot dorsalis pedis 1+ [Pes Planus] : pes planus deformity [Vibration Dec.] : diminished vibratory sensation at the level of the toes [Ankle Swelling (On Exam)] : not present [Varicose Veins Of Lower Extremities] : not present [] : not present [de-identified] : b/l bunion deformity, right 2nd hyperextension of toe at the MPJ [FreeTextEntry1] : \par  [Diminished Throughout Right Foot] : normal sensation with monofilament testing throughout right foot [Diminished Throughout Left Foot] : normal sensation with monofilament testing throughout left foot

## 2021-06-08 NOTE — HISTORY OF PRESENT ILLNESS
[FreeTextEntry1] : 52 y/o diabetic male returns for orthotic molding. Last visit, nail biopy was taken to eval for onychomycosis

## 2021-06-11 ENCOUNTER — APPOINTMENT (OUTPATIENT)
Dept: UROLOGY | Facility: CLINIC | Age: 51
End: 2021-06-11
Payer: MEDICARE

## 2021-06-11 DIAGNOSIS — N52.01 ERECTILE DYSFUNCTION DUE TO ARTERIAL INSUFFICIENCY: ICD-10-CM

## 2021-06-11 DIAGNOSIS — Z12.5 ENCOUNTER FOR SCREENING FOR MALIGNANT NEOPLASM OF PROSTATE: ICD-10-CM

## 2021-06-11 PROCEDURE — 99213 OFFICE O/P EST LOW 20 MIN: CPT

## 2021-06-11 NOTE — ASSESSMENT
[FreeTextEntry1] : IPP 3 months ago\par very happy with results\par doing well\par \par PSA Jan 2020-- 1.15\par \par

## 2021-06-22 LAB
PSA FREE FLD-MCNC: 20 %
PSA FREE SERPL-MCNC: 0.36 NG/ML
PSA SERPL-MCNC: 1.79 NG/ML

## 2021-06-24 ENCOUNTER — EMERGENCY (EMERGENCY)
Facility: HOSPITAL | Age: 51
LOS: 0 days | Discharge: HOME | End: 2021-06-24
Attending: EMERGENCY MEDICINE | Admitting: EMERGENCY MEDICINE
Payer: MEDICARE

## 2021-06-24 VITALS
HEART RATE: 98 BPM | DIASTOLIC BLOOD PRESSURE: 100 MMHG | RESPIRATION RATE: 18 BRPM | OXYGEN SATURATION: 97 % | TEMPERATURE: 97 F | HEIGHT: 77 IN | WEIGHT: 244.93 LBS | SYSTOLIC BLOOD PRESSURE: 166 MMHG

## 2021-06-24 DIAGNOSIS — Z87.438 PERSONAL HISTORY OF OTHER DISEASES OF MALE GENITAL ORGANS: ICD-10-CM

## 2021-06-24 DIAGNOSIS — Z87.19 PERSONAL HISTORY OF OTHER DISEASES OF THE DIGESTIVE SYSTEM: ICD-10-CM

## 2021-06-24 DIAGNOSIS — Z79.84 LONG TERM (CURRENT) USE OF ORAL HYPOGLYCEMIC DRUGS: ICD-10-CM

## 2021-06-24 DIAGNOSIS — Z79.899 OTHER LONG TERM (CURRENT) DRUG THERAPY: ICD-10-CM

## 2021-06-24 DIAGNOSIS — R21 RASH AND OTHER NONSPECIFIC SKIN ERUPTION: ICD-10-CM

## 2021-06-24 DIAGNOSIS — L30.9 DERMATITIS, UNSPECIFIED: ICD-10-CM

## 2021-06-24 DIAGNOSIS — Z98.890 OTHER SPECIFIED POSTPROCEDURAL STATES: Chronic | ICD-10-CM

## 2021-06-24 DIAGNOSIS — I10 ESSENTIAL (PRIMARY) HYPERTENSION: ICD-10-CM

## 2021-06-24 DIAGNOSIS — E11.9 TYPE 2 DIABETES MELLITUS WITHOUT COMPLICATIONS: ICD-10-CM

## 2021-06-24 PROCEDURE — 99282 EMERGENCY DEPT VISIT SF MDM: CPT

## 2021-06-24 NOTE — ED PROVIDER NOTE - NS ED ROS FT
Eyes:  No visual changes, eye pain or discharge.  ENMT:  No hearing changes, pain, discharge or infections. No neck pain or stiffness.  Cardiac:  No chest pain, SOB or edema  Respiratory:  No cough or respiratory distress. No hemoptysis. No history of asthma or RAD.  GI:  No nausea, vomiting, diarrhea or abdominal pain.  MS:  No myalgia, muscle weakness, joint pain or back pain.  Neuro:  No headache or weakness.  No LOC.  Skin:  + skin rash  Endocrine: No history of thyroid disease or diabetes.  Except as documented in the HPI,  all other systems are negative.

## 2021-06-24 NOTE — ED PROVIDER NOTE - CLINICAL SUMMARY MEDICAL DECISION MAKING FREE TEXT BOX
51 y.o. male, PMH of DM, c/o dryness to b/l ankles, right> left for 3 wks. States right ankle is itching a lot and he has been scratching it to the point that he has skin discoloration now. Pt denies fever, chills, weakness, numbness, leg swelling, redness, streaking, drainage. On exam, pt in NAD, AAOx3, head NC/AT, CN II-XII intact, lungs CTA B/L, CV S1S2 regular, abdomen soft/NT/ND/(+)BS, ext (-) edema, (+) darkening of the skin to right ankle about 3x4cm, no warmth/redness/streaking/drainage, pulses intact, good cap refill, sensation intact. Advised to use benadryl for itching, moisturizer to both ankles. Will d/c with derm follow up.

## 2021-06-24 NOTE — ED PROVIDER NOTE - PATIENT PORTAL LINK FT
You can access the FollowMyHealth Patient Portal offered by [facility] by registering at the following website: [email or web address]. By joining [employer]’s FollowMyHealth portal, you will also be able to view your health information using other applications (apps) compatible with our system.

## 2021-06-24 NOTE — ED PROVIDER NOTE - PHYSICAL EXAMINATION
VITAL SIGNS: I have reviewed nursing notes and confirm.  CONSTITUTIONAL: Well-developed; well-nourished; in no acute distress.   SKIN: dry area of skin to right ankle, no redness, warmth, fluctuance or streaking  HEAD: Normocephalic; atraumatic.  EYES:  conjunctiva and sclera clear.  ENT: No nasal discharge; airway clear.  EXT: Normal ROM.  No clubbing, cyanosis or edema.   NEURO: Alert, oriented, grossly unremarkable

## 2021-06-24 NOTE — ED PROVIDER NOTE - NSFOLLOWUPCLINICS_GEN_ALL_ED_FT
[facility]  Medicine  [address], [place]   Phone: [phone]  Fax:     [facility]  Podiatry  .  [place]   Phone: [phone]  Fax:

## 2021-06-24 NOTE — ED PROVIDER NOTE - OBJECTIVE STATEMENT
Pt is a 50y/o male with a pmhx of DM presents today for eval of dryness to right ankle x 3 weeks that has been constant with no aggravating/alleviating factors. Pt denies fever, chills, weakness, numbness, Leg swelling, redness, streaking, drainage.

## 2021-06-24 NOTE — ED ADULT NURSE NOTE - OBJECTIVE STATEMENT
52 y/o male presents to ED with patch of dry, discolored skin on right ankle. no redness/drainage noted in the area. pt reports constant itching, denies fever/chills.

## 2021-07-01 ENCOUNTER — APPOINTMENT (OUTPATIENT)
Dept: PODIATRY | Facility: CLINIC | Age: 51
End: 2021-07-01

## 2021-07-02 ENCOUNTER — OUTPATIENT (OUTPATIENT)
Dept: OUTPATIENT SERVICES | Facility: HOSPITAL | Age: 51
LOS: 1 days | Discharge: HOME | End: 2021-07-02

## 2021-07-02 ENCOUNTER — APPOINTMENT (OUTPATIENT)
Dept: PODIATRY | Facility: CLINIC | Age: 51
End: 2021-07-02
Payer: MEDICARE

## 2021-07-02 DIAGNOSIS — L30.9 DERMATITIS, UNSPECIFIED: ICD-10-CM

## 2021-07-02 DIAGNOSIS — Z98.890 OTHER SPECIFIED POSTPROCEDURAL STATES: Chronic | ICD-10-CM

## 2021-07-02 PROCEDURE — 99213 OFFICE O/P EST LOW 20 MIN: CPT

## 2021-07-02 NOTE — ASSESSMENT
[FreeTextEntry1] : -presenting concern was likely secondary to post inflammatory  hyperpigmentation and direct trauma from itching \par -Rx  triamcinolone if pruritus recurs \par -continue moisturizing  skin\par -return 3 month

## 2021-07-02 NOTE — HISTORY OF PRESENT ILLNESS
[FreeTextEntry1] : 50 y/o DM male presents w/ a new complaint of serous drainage from left LE. Patient notes increased pruritus to medial aspect of left ankle; pt subsequently noticed serous drainage. Patient started applying topical OTC Benadryl which relieved symptoms. Patient here today because he was concerned about drainage from the site

## 2021-07-02 NOTE — PHYSICAL EXAM
[General Appearance - Alert] : alert [General Appearance - In No Acute Distress] : in no acute distress [1+] : left foot dorsalis pedis 1+ [Oriented To Time, Place, And Person] : oriented to person, place, and time [Impaired Insight] : insight and judgment were intact [Ankle Swelling (On Exam)] : not present [Varicose Veins Of Lower Extremities] : not present [] : not present [FreeTextEntry1] : brown hyperpigmentation on medial aspect of the left ankle. No serous drainage appreciated.

## 2021-07-20 ENCOUNTER — APPOINTMENT (OUTPATIENT)
Dept: PODIATRY | Facility: CLINIC | Age: 51
End: 2021-07-20
Payer: MEDICARE

## 2021-07-20 VITALS
WEIGHT: 245 LBS | HEART RATE: 97 BPM | DIASTOLIC BLOOD PRESSURE: 88 MMHG | HEIGHT: 77 IN | TEMPERATURE: 96.5 F | BODY MASS INDEX: 28.93 KG/M2 | SYSTOLIC BLOOD PRESSURE: 145 MMHG

## 2021-07-20 DIAGNOSIS — L29.9 PRURITUS, UNSPECIFIED: ICD-10-CM

## 2021-07-20 PROCEDURE — 99213 OFFICE O/P EST LOW 20 MIN: CPT

## 2021-07-20 RX ORDER — TRIAMCINOLONE ACETONIDE 0.25 MG/G
0.03 OINTMENT TOPICAL
Qty: 1 | Refills: 0 | Status: ACTIVE | COMMUNITY
Start: 2021-07-02 | End: 1900-01-01

## 2021-07-21 PROBLEM — L29.9 PRURITUS: Status: ACTIVE | Noted: 2021-07-02

## 2021-07-21 NOTE — HISTORY OF PRESENT ILLNESS
[FreeTextEntry1] : patient presents to  his orthotics. On last visit, patient was treated for b/l LE pruritus. Notes relief with topical steroid cream. Patient needs a renewal of medication

## 2021-07-21 NOTE — PHYSICAL EXAM
[General Appearance - Alert] : alert [General Appearance - In No Acute Distress] : in no acute distress [1+] : left foot dorsalis pedis 1+ [Pes Planus] : pes planus deformity [Vibration Dec.] : diminished vibratory sensation at the level of the toes [Oriented To Time, Place, And Person] : oriented to person, place, and time [Impaired Insight] : insight and judgment were intact [Ankle Swelling (On Exam)] : not present [Varicose Veins Of Lower Extremities] : not present [] : not present [de-identified] : b/l bunion deformity, right 2nd hyperextension of toe at the MPJ [Diminished Throughout Right Foot] : normal sensation with monofilament testing throughout right foot [Diminished Throughout Left Foot] : normal sensation with monofilament testing throughout left foot

## 2021-07-21 NOTE — ASSESSMENT
[FreeTextEntry1] : Orthotics dispensed to patient. Patient was fitted for accurate fit and placed in a gait cycle. no adjustments were made. \par Instructed to gradually increase orthotic use by 2-3 hours daily for the first week\par renewed triamcinolone \par return 3 month

## 2021-09-03 ENCOUNTER — APPOINTMENT (OUTPATIENT)
Dept: PODIATRY | Facility: CLINIC | Age: 51
End: 2021-09-03

## 2021-09-17 NOTE — ASU PREOP CHECKLIST - NSSDAENDDT_GEN_ALL_CORE
[FreeTextEntry1] : Recent bout of Shingles 2 weeks ago\par Seen in ED\par Sent homer with Valtrex  [de-identified] : Otherwise without complaint 25-Feb-2021 14:03

## 2021-10-02 NOTE — CHART NOTE - NSCHARTNOTEFT_GEN_A_CORE
Last seen: 5/27/21  Follow up appointment: None  Last filled: adderall 30 mg  9/1/21      Okay to refill?   Please advise.      PACU ANESTHESIA ADMISSION NOTE      Procedure: Insertion, penile prosthesis, inflatable      Post op diagnosis:  Erectile dysfunction        ____  Intubated  TV:______       Rate: ______      FiO2: ______    __x__  Patent Airway    __x__  Full return of protective reflexes    __x__  Full recovery from anesthesia / back to baseline     Vitals:   T: 98.3          R:  14                BP: 161/92                  Sat: 100                  P: 102      Mental Status:  _x___ Awake   _____ Alert   _____ Drowsy   _____ Sedated    Nausea/Vomiting:  __x__ NO  ______Yes,   See Post - Op Orders          Pain Scale (0-10):  _____    Treatment: ____ None    __x__ See Post - Op/PCA Orders    Post - Operative Fluids:   ____ Oral   __x__ See Post - Op Orders    Plan: Discharge:   __x__Home       _____Floor     _____Critical Care    _____  Other:_________________    Comments: 36.6

## 2021-10-26 ENCOUNTER — APPOINTMENT (OUTPATIENT)
Dept: PODIATRY | Facility: CLINIC | Age: 51
End: 2021-10-26

## 2021-12-01 PROCEDURE — G9005: CPT

## 2022-06-06 ENCOUNTER — EMERGENCY (EMERGENCY)
Facility: HOSPITAL | Age: 52
LOS: 0 days | Discharge: HOME | End: 2022-06-06
Attending: EMERGENCY MEDICINE | Admitting: EMERGENCY MEDICINE
Payer: MEDICARE

## 2022-06-06 VITALS
HEIGHT: 77 IN | TEMPERATURE: 98 F | HEART RATE: 94 BPM | DIASTOLIC BLOOD PRESSURE: 84 MMHG | SYSTOLIC BLOOD PRESSURE: 193 MMHG | OXYGEN SATURATION: 99 % | WEIGHT: 244.93 LBS | RESPIRATION RATE: 18 BRPM

## 2022-06-06 DIAGNOSIS — R09.81 NASAL CONGESTION: ICD-10-CM

## 2022-06-06 DIAGNOSIS — Z79.84 LONG TERM (CURRENT) USE OF ORAL HYPOGLYCEMIC DRUGS: ICD-10-CM

## 2022-06-06 DIAGNOSIS — U07.1 COVID-19: ICD-10-CM

## 2022-06-06 DIAGNOSIS — R05.1 ACUTE COUGH: ICD-10-CM

## 2022-06-06 DIAGNOSIS — J02.9 ACUTE PHARYNGITIS, UNSPECIFIED: ICD-10-CM

## 2022-06-06 DIAGNOSIS — E11.9 TYPE 2 DIABETES MELLITUS WITHOUT COMPLICATIONS: ICD-10-CM

## 2022-06-06 DIAGNOSIS — Z98.890 OTHER SPECIFIED POSTPROCEDURAL STATES: Chronic | ICD-10-CM

## 2022-06-06 LAB — SARS-COV-2 RNA SPEC QL NAA+PROBE: DETECTED

## 2022-06-06 PROCEDURE — 99283 EMERGENCY DEPT VISIT LOW MDM: CPT | Mod: CS,GC

## 2022-06-06 PROCEDURE — 71046 X-RAY EXAM CHEST 2 VIEWS: CPT | Mod: 26

## 2022-06-06 NOTE — ED ADULT NURSE NOTE - NSIMPLEMENTINTERV_GEN_ALL_ED
Implemented All Universal Safety Interventions:  [place] to call system. Call bell, personal items and telephone within reach. Instruct patient to call for assistance. Room bathroom lighting operational. Non-slip footwear when patient is off stretcher. Physically safe environment: no spills, clutter or unnecessary equipment. Stretcher in lowest position, wheels locked, appropriate side rails in place.

## 2022-06-06 NOTE — ED PROVIDER NOTE - NS ED ROS FT
Constitutional: No fevers, chills, or malaise.  HEENT: No headache, visual changes   Cardiac:  No chest pain, SOB, leg edema, or leg pain.  Respiratory:  Reports cough. No respiratory distress, or hemoptysis.  GI:  No nausea, vomiting, diarrhea, or abdominal pain.  :  No dysuria, frequency, or urgency.  MS:  No myalgia, muscle weakness  Neuro:  No dizziness, LOC, paralysis, or N/T.  Skin:  No skin rash.   Endocrine: No polyuria, polyphagia, or polydipsia.

## 2022-06-06 NOTE — ED PROVIDER NOTE - OBJECTIVE STATEMENT
53 yo male w/ PMH of DM presents for cough and nasal congestion x 5 days. Associated mild sore throat. No fevers, chest pain, SOB, abdominal pain, N/V, diarrhea. Fully vaccinated for COVID.

## 2022-06-06 NOTE — ED PROVIDER NOTE - PATIENT PORTAL LINK FT
You can access the FollowMyHealth Patient Portal offered by [facility] by registering at the following website: [email or web address]. By joining [employer]’s FollowMyHealth portal, you will also be able to view your health information using other applications (apps) compatible with our system.

## 2022-06-06 NOTE — ED PROVIDER NOTE - CLINICAL SUMMARY MEDICAL DECISION MAKING FREE TEXT BOX
No distress.  VSS.  No hypoxia.  COVID PCR pending.  CXR negative.  D/C home.  Supportive care.  Strict return instructions discussed.

## 2022-06-06 NOTE — ED PROVIDER NOTE - CARE PROVIDER_API CALL
Roby Mcgill  Internal Medicine  [id number] [place] [address]  Phone: [phone]  Fax: [phone]  Established Patient  Follow Up Time: 1-3 Days

## 2022-06-06 NOTE — ED PROVIDER NOTE - NSDCPRINTRESULTS_ED_ALL_ED
Oral Chemotherapy Monitoring Program.    Patient currently on ribociclib therapy.    Called to discuss lab results from 5/27/22. Patient has grade 2 neutropenia. She is due to start her next cycle on 6/1. Call placed to patient but voicemail had to be left. No drug names were mentioned.    Plan: Ok to proceed with next cycle of ribociclib. Plan has been to increase to 600mg again. OK to proceed with next cycle at 600mg but keep a close eye on counts on 6/27.  Repeat labs on 6/27--labs already scheduled.     Ashley Mcadams PharmD  May 27, 2022       Patient requests all Lab, Cardiology, and Radiology Results on their Discharge Instructions

## 2022-06-06 NOTE — ED PROVIDER NOTE - PHYSICAL EXAMINATION
GENERAL: NAD   SKIN: warm, dry  HEAD: Normocephalic; atraumatic.  EYES: PERRLA, EOMI, no conjunctival erythema  ENT: Oropharynx WNL   CARD: S1, S2 normal; no murmurs, gallops, or rubs. Regular rate and rhythm.   RESP: LCTAB; No wheezes, rales, rhonchi, or stridor.  NEURO: Alert, oriented, grossly unremarkable  PSYCH: Cooperative, appropriate.

## 2022-07-18 ENCOUNTER — APPOINTMENT (OUTPATIENT)
Dept: PLASTIC SURGERY | Facility: CLINIC | Age: 52
End: 2022-07-18

## 2022-07-18 VITALS — BODY MASS INDEX: 28.93 KG/M2 | WEIGHT: 245 LBS | HEIGHT: 77 IN

## 2022-07-18 DIAGNOSIS — Z78.9 OTHER SPECIFIED HEALTH STATUS: ICD-10-CM

## 2022-07-18 PROCEDURE — 99203 OFFICE O/P NEW LOW 30 MIN: CPT

## 2022-07-18 RX ORDER — ASCORBIC ACID 250 MG
250 TABLET ORAL
Refills: 0 | Status: ACTIVE | COMMUNITY

## 2022-07-18 RX ORDER — ISOPROPYL ALCOHOL 700 MG/ML
LIQUID TOPICAL
Refills: 0 | Status: ACTIVE | COMMUNITY

## 2022-07-18 RX ORDER — FLAXSEED OIL 1000 MG
CAPSULE ORAL
Refills: 0 | Status: ACTIVE | COMMUNITY

## 2022-07-18 NOTE — HISTORY OF PRESENT ILLNESS
[FreeTextEntry1] : 51 yo M with PMHx of HTN, Type II DM last HgA1c 9, , GERD who presents today for evaluation of gynecomastia. Patient endorses having enlarged chest since puberty at 11 with gradual increase in size now c/o asymmetry L>R and left breast discomfort. Patient denies any steroid use but has been taking a lot of Zantc for GERD. \par No change in chest size with weight loss in the past. \par No prior endocrine workup. \par \par Occupation - on disability for [employer] \par Social hx - does not smoke cigarettes, no drug use \par Denies any h/o DVT/PE or MRSA infections. \par \par PMD Dr. Mcgill

## 2022-07-18 NOTE — ASSESSMENT
[FreeTextEntry1] : 53 yo M with PMHx of DM with Grade 3 gynecomastia of bilateral breasts  L>R\par \par -I had a long discussion regarding the treatment options, direct excision and possible suction-assisted lipectomy.\par I explained the benefits, risks, and alternatives of each procedure. The risks include but not limited to bleeding, infection, seroma, hematoma, asymmetry, contour deformity, suboptimal cosmesis, scarring, and keloid formation. He also understands that with ongoing weight loss after surgery he may have deflation and laxity of soft tissue and skin resulting in a suboptimal cosmesis.\par -BL mammogram r/o gynecomastia\par -He understands that he will be wearing a post-operative chest compression garment for an extended period, and he may have post-op drains.\par -Given clinical gynecomastia, I would like to rule out other etiologies. Labwork ordered (TFTs, LFTs, prolactin, CBC, beta-HCG, LH, FSH, testosterone, estradiol)\par -All questions were answered.\par -Follow up after mammogram\par -Photos at next office visit for mammogram review, gynecomastia lab work-up, and surgical scheduling.\par \par Due to COVID-19, pre-visit patient instructions were explained to the patient and their symptoms were checked upon arrival. Masks were used by the healthcare provider and staff and the examination room was cleaned after the patient visit concluded\par

## 2022-07-18 NOTE — PHYSICAL EXAM
[de-identified] : well developed pleasant male, NAD [de-identified] : NC/AT [de-identified] : supple [de-identified] : unlabored breathing,  [de-identified] : JOSE ER [de-identified] : Neck: no cervical spine point tenderness; bilateral shoulder grooving present\par \par Left breast: no palpable masses, nipple retraction or discharge.\par Right breast: no palpable masses, nipple retraction or discharge.\par Moderate bilateral axillary rolls\par Chest: no trunk deformities\par Bilateral macromastia with Grade II ptosis with no active inframammary fold rash\par Anticipated volume of resection of EACH breast based on CLINICAL ASSESSMENT: \par Anticipated volume of resection of EACH breast based on BSA: 500 g\par \par Breast measurements (standing, cm):\par R SN-Nipple: \par R Nipple-IMF:\par R Nipple - midsternum: \par R NAC diameter: \par IMF position ***symmetrical, left *** cm *lower/higher* than right breast\par L SN-Nipple: \par L Nipple-IMF: \par L Nipple - midsternum: \par L NAC diameter:\par  [de-identified] : soft, nontender

## 2022-07-21 ENCOUNTER — LABORATORY RESULT (OUTPATIENT)
Age: 52
End: 2022-07-21

## 2022-07-29 ENCOUNTER — NON-APPOINTMENT (OUTPATIENT)
Age: 52
End: 2022-07-29

## 2022-07-29 ENCOUNTER — RESULT REVIEW (OUTPATIENT)
Age: 52
End: 2022-07-29

## 2022-07-29 ENCOUNTER — OUTPATIENT (OUTPATIENT)
Dept: OUTPATIENT SERVICES | Facility: HOSPITAL | Age: 52
LOS: 1 days | Discharge: HOME | End: 2022-07-29

## 2022-07-29 DIAGNOSIS — Z98.890 OTHER SPECIFIED POSTPROCEDURAL STATES: Chronic | ICD-10-CM

## 2022-07-29 DIAGNOSIS — N62 HYPERTROPHY OF BREAST: ICD-10-CM

## 2022-07-29 PROCEDURE — G0279: CPT | Mod: 26

## 2022-07-29 PROCEDURE — 77066 DX MAMMO INCL CAD BI: CPT | Mod: 26

## 2022-08-15 ENCOUNTER — APPOINTMENT (OUTPATIENT)
Dept: PLASTIC SURGERY | Facility: CLINIC | Age: 52
End: 2022-08-15

## 2022-08-15 PROCEDURE — 99212 OFFICE O/P EST SF 10 MIN: CPT

## 2022-08-15 NOTE — ASSESSMENT
[FreeTextEntry1] : 52yoM with PMHx of DM with Grade 3 gynecomastia of bilateral breasts  L>R. Would like to lose additional weight and get closer to 210lbs (236lbs today in the office)\par \par Recommend double-incision keyhole gynecomastia excision with dermal pedicle (NAC) after weight loss.\par \par - Would recommend coming to office ~ 2 months after losing weight in order to get best contour and results\par - Reviewed Breast mammogram with pt - minima retroareolar gynecomastia tissues BL\par - Reviewed pt gynecomastia lab work - up. Negative except elevated Cr. \par - photos taken today, will submit to insurance for medically necessary gynecomastia excision via key hold technique\par - Will need to meet with PCP for Cr and DM optimization prior to Sx\par - Follow up in 2 months for weight loss check, possible surgical scheduling\par \par Photos were taken with patient permission.\par \par Due to COVID-19, pre-visit patient instructions were explained to the patient and their symptoms were checked upon arrival. Masks were used by the healthcare provider and staff and the examination room was cleaned after the patient visit concluded\par

## 2022-08-15 NOTE — PHYSICAL EXAM
[de-identified] : well developed pleasant male, NAD [de-identified] : NC/AT [de-identified] : unlabored breathing,  [de-identified] : supple [de-identified] : JOSE ER [de-identified] : no trunk deformities [de-identified] : Neck: no cervical spine point tenderness; bilateral shoulder grooving present\par \par Left breast: no palpable masses, nipple retraction or discharge.\par Right breast: no palpable masses, nipple retraction or discharge.\par Minimal bilateral axillary rolls\par \par Bilateral gynecomastia (L>R) with Grade II/III with no active inframammary fold rash\par Anticipated volume of resection of EACH breast based on BSA: 400-500 g [de-identified] : soft, nontender

## 2022-08-15 NOTE — HISTORY OF PRESENT ILLNESS
[FreeTextEntry1] : 53 yo M with PMHx of HTN, Type II DM last HgA1c 9, , GERD who presents today for evaluation of gynecomastia. Patient endorses having enlarged chest since puberty at 11 with gradual increase in size now c/o asymmetry L>R and left breast discomfort. Patient denies any steroid use but has been taking a lot of Zantc for GERD. \par No change in chest size with weight loss in the past. \par No prior endocrine workup. \par \par Occupation - on disability for [employer] \par Social hx - does not smoke cigarettes, no drug use \par Denies any h/o DVT/PE or MRSA infections. \par \par PMD Dr. Mcgill \par \par Interval Hx (8/15/22):  Pt reports no new changes in health, L side has remained more tender than R. Believes his sugars have been betters lately has he has made some changes in his diet. Lab results and mammogram reviewed with pt.  Cr high on labs would recommend follow up with PCP. Mammogram reviewed with minimal retroareolar breast tissue

## 2022-10-19 ENCOUNTER — EMERGENCY (EMERGENCY)
Facility: HOSPITAL | Age: 52
LOS: 0 days | Discharge: HOME | End: 2022-10-19
Attending: STUDENT IN AN ORGANIZED HEALTH CARE EDUCATION/TRAINING PROGRAM | Admitting: STUDENT IN AN ORGANIZED HEALTH CARE EDUCATION/TRAINING PROGRAM

## 2022-10-19 VITALS
SYSTOLIC BLOOD PRESSURE: 176 MMHG | RESPIRATION RATE: 17 BRPM | HEIGHT: 77 IN | DIASTOLIC BLOOD PRESSURE: 88 MMHG | TEMPERATURE: 98 F | OXYGEN SATURATION: 99 % | HEART RATE: 85 BPM

## 2022-10-19 DIAGNOSIS — M21.961 UNSPECIFIED ACQUIRED DEFORMITY OF RIGHT LOWER LEG: ICD-10-CM

## 2022-10-19 DIAGNOSIS — Z02.9 ENCOUNTER FOR ADMINISTRATIVE EXAMINATIONS, UNSPECIFIED: ICD-10-CM

## 2022-10-19 DIAGNOSIS — E11.9 TYPE 2 DIABETES MELLITUS WITHOUT COMPLICATIONS: ICD-10-CM

## 2022-10-19 DIAGNOSIS — Z98.890 OTHER SPECIFIED POSTPROCEDURAL STATES: Chronic | ICD-10-CM

## 2022-10-19 DIAGNOSIS — Z79.84 LONG TERM (CURRENT) USE OF ORAL HYPOGLYCEMIC DRUGS: ICD-10-CM

## 2022-10-19 DIAGNOSIS — Z98.890 OTHER SPECIFIED POSTPROCEDURAL STATES: ICD-10-CM

## 2022-10-19 DIAGNOSIS — Z87.19 PERSONAL HISTORY OF OTHER DISEASES OF THE DIGESTIVE SYSTEM: ICD-10-CM

## 2022-10-19 DIAGNOSIS — Z87.438 PERSONAL HISTORY OF OTHER DISEASES OF MALE GENITAL ORGANS: ICD-10-CM

## 2022-10-19 DIAGNOSIS — I10 ESSENTIAL (PRIMARY) HYPERTENSION: ICD-10-CM

## 2022-10-19 DIAGNOSIS — M79.674 PAIN IN RIGHT TOE(S): ICD-10-CM

## 2022-10-19 PROCEDURE — 99283 EMERGENCY DEPT VISIT LOW MDM: CPT

## 2022-10-19 PROCEDURE — 73630 X-RAY EXAM OF FOOT: CPT | Mod: 26,RT

## 2022-10-19 NOTE — ED ADULT NURSE NOTE - NSIMPLEMENTINTERV_GEN_ALL_ED
Implemented All Universal Safety Interventions:  [place] to call system. Call bell, personal items and telephone within reach. Instruct patient to call for assistance. Room bathroom lighting operational. Non-slip footwear when patient is off stretcher. Physically safe environment: no spills, clutter or unnecessary equipment. Stretcher in lowest position, wheels locked, appropriate side rails in place.

## 2022-10-19 NOTE — ED PROVIDER NOTE - ATTENDING APP SHARED VISIT CONTRIBUTION OF CARE
52-year-old male with history of cysts surgery to the right second toe presenting with increased deformity to the right second toe.  Denies any fevers or chills.  Denies any overt trauma.  Endorses mild pain.    On exam right second toe appears laterally displaced however no significant point tenderness, no overlying erythema warmth or edema.  Neurovascular intact.    Imaging reviewed and compared to previous x-rays, no overt fracture identified, given similarity to previous x-rays, likely chronic deformity.  Instructed to follow-up with podiatry for further management.  Should patient develop fevers, swelling erythema or significant pain to return to ED for re-evaluation. 52-year-old male with history of surgery to the right second toe presenting with increased deformity to the right second toe.  Denies any fevers or chills.  Denies any overt trauma.  Endorses mild pain.    On exam right second toe appears laterally displaced however no significant point tenderness, no overlying erythema warmth or edema.  Neurovascular intact.    Imaging reviewed and compared to previous x-rays, no overt fracture identified, given similarity to previous x-rays, likely chronic deformity.  Instructed to follow-up with podiatry for further management.  Should patient develop fevers, swelling erythema or significant pain to return to ED for re-evaluation.

## 2022-10-19 NOTE — ED PROVIDER NOTE - PATIENT PORTAL LINK FT
You can access the FollowMyHealth Patient Portal offered by [facility] by registering at the following website: [email or web address]. By joining [employer]’s FollowMyHealth portal, you will also be able to view your health information using other applications (apps) compatible with our system.

## 2022-10-19 NOTE — ED ADULT TRIAGE NOTE - HEART RATE (BEATS/MIN)
Continuity of Care Document (CCD)

                             Created on: 09/10/2021



Eryn Jacobo

External Reference #: [id number]

: 1944

Sex: Female



Demographics





                          Address                   [address]

[place] [id number]

 

                          Home Phone                [phone]

 

                          Preferred Language        Unknown

 

                          Marital Status            Unknown

 

                          Sabianism Affiliation     Unknown

 

                          Race                      White

 

                          Ethnic Group              Not  or 





Author





                          Author                    Holter/Event/Telemetry, Ismael DUMONT

 

                          Organization              Unknown

 

                          Address                   [address], [address]

[place]  [address]



 

                          Phone                     [phone]







Care Team Providers





                    Care Team Member Name Role                Phone

 

                    Sumaya Martinez PA-C [facility]                [phone]

 

                    José Luis Short MD  AUTM                [phone]

 

                    Tera Morgan MD [facility]                [phone]

 

                    Halle Fallon RN ANP  [facility]                [phone]

 

                    Abhay Mario MD AUTM                [phone]







Problems





                    Active Problems     Provider            Date

 

                    History of coronary artery bypass grafting Dennis Ballard MD Onset: 

06/15/2021

 

                          Patient post percutaneous transluminal coronary angiop

lasty Dennis Ballard MD

                                        Onset: 06/15/2021

 

                    Essential hypertension Dennis Ballard MD Onset: 06/15/202

1

 

                    Aortic valve disorder Dennis Ballard MD Onset: 06/15/2021

 

                    Chest pain          Dennis Ballard MD Onset: 06/15/2021

 

                    Mixed hyperlipidemia Dennis Ballard MD Onset: 06/15/2021

 

                    Dietary management surveillance Dennsi Ballard MD Onset: 

06/15/2021

 

                          Coronary arteriosclerosis after percutaneous coronary 

angioplasty Dennis Ballard MD                             Onset: 06/15/2021

 

                    Overweight          Dennis Ballard MD Onset: 06/15/2021

 

                    Electrocardiogram abnormal Dennis Ballard MD Onset: 06/15

/2021

 

                          Right bundle branch block AND left anterior fascicular

 block Dennis Ballard MD                                      Onset: 06/15/2021

 

                    Old myocardial infarction Dennis Ballard MD Onset: 06/15/

2021

 

                    Palpitations        Dennis Ballard MD Onset: 06/15/2021

 

                    Syncope and collapse Dennis Ballard MD Onset: 06/15/2021

 

                    Dizziness and giddiness Dennis Ballard MD Onset: 06/15/20

21







Social History





                Type            Date            Description     Comments

 

                Birth Sex                       Unknown          

 

                ETOH Use                        Rarely consumes alcohol  

 

                Tobacco Use     Start: Unknown End: Unknown Patient is a former 

smoker started at 

age 10, at most 1 ppd, quit in  

 

                Smoking Status  Reviewed: 07/15/21 Patient is a former smoker st

arted at age 10, 

at most 1 ppd, quit in  

 

                Exercise Type/Frequency                 Does not exercise curren

tly  

 

                Exercise Limitations                 Back Pain        

 

                Exercise Limitations                 Joint Pain      bilateral a

marjorie and legs 

 

                Exercise Limitations                 Claudication     







Allergies, Adverse Reactions, Alerts





             Active Allergies Criticality  Reaction | Severity Comments     Date

 

             Amoxicillin  Unable to assess criticality                          

 06/15/2021

 

             Atorvastatin Unable to assess criticality                          

 06/15/2021

 

             Tetracycline Unable to assess criticality                          

 06/15/2021

 

             Buspirone    Unable to assess criticality                          

 06/15/2021

 

             Adhesives    Unable to assess criticality                          

 06/15/2021

 

             Butalbital   Unable to assess criticality Hives                    

 2021

 

             Alinia       Unable to assess criticality                          

 06/15/2021

 

             Clindamycin/Lincomycin Unable to assess criticality Diarrhea       

           2021

 

             Carvedilol   Unable to assess criticality              Nausea      

 07/15/2021

 

             Nitazoxanide Unable to assess criticality Hives                    

 2021

 

             Repatha      Unable to assess criticality              Muscle Cramp

s 07/15/2021

 

             Statins      Unable to assess criticality                          

 2021

 

             Sulfamethoxazole / Trimethoprim Unable to assess criticality Rash |

 Mild               

2021







Medications





           Active Medications SIG        Qnty       Indications Ordering Provide

r Date

 

                          Praluent                     150mg/ml Solution Auto-In

ject                   

inject 1 milliliters subcutaneous every 2 weeks 3ml             E78.2           

Dennis Ballard MD 

2021

 

                          Promethazine HCL                     25mg Tablets     

              1 by mouth 

every 6 hours as needed                                 Unknown         20

 

                          Zinc Chelated                     50mg Tablets        

           1 by mouth once

daily                                           Unknown         2021

 

                    Probiotic                      Capsules                   1 

by mouth every day  

                                        Unknown             2021

 

             Magnesium                     400mg Tablets                   165 m

g daily                           

Unknown                                 2021

 

                          Metoprolol Tartrate                     25mg Tablets  

                 Half 

tablet by mouth twice a day                 I10             Unknown         

 

                          Nitroglycerin                     0.6mg/HR Patches 24H

R                   1 

patch applied to skin every day (keep on 14 hours daily then remove) 30units    

               

I25.10                    Dennis Ballard MD      06/15/2021

 

                          Tylenol Extra Strength                     500mg Table

ts                   2 by 

mouth twice daily as needed                                 Unknown         

 

                          Allopurinol                     300mg Tablets         

          1 by mouth every

day                                             Unknown         2021

 

                                        Vitamin D (Ergocalciferol)              

       1.25mg (42688 Ut) Capsules       

             1 by mouth every weekly                           Unknown      

 

                                        Vitamin Deficiency Injectable System-B12

                     1000mcg/ML Kit     

             inject 1 kit once monthly                           Unknown      

 

                          Ranexa                     500mg Tablets ER 12HR      

             2 by mouth 

twice a day                                     Unknown         2021

 

                          Meclizine HCL                     25mg Chewtabs       

            1 by mouth 

four times daily as needed                                 Unknown         

 

                          Pantoprazole Sodium                     40mg Tablets D

R                   2 by 

mouth every day                                 Unknown         2021

 

                          Tramadol HCL                     50mg Tablets         

          2 by mouth every

8 hours as needed, as directed                                 Unknown         0

2021

 

                    Plavix                     75mg Tablets                   1 

by mouth every day  

                                        Unknown             2021

 

                          Aspirin                     325mg Tablets DR          

         1 by mouth every 

day                                             Unknown         2021

 

                          Nitroglycerin                     0.4mg Tablets Sub   

                1 tab sl 

every 5 min times 3 doses as needed chest discomfort                            

     Unknown         2021

 

                          Escitalopram Oxalate                     10mg Tablets 

                  1 by 

mouth every day                                 Unknown         2021

 

                          Valsartan                     80mg Tablets            

       2 by mouth daily at

bedtime                                         Unknown         2021

 

                                        Albuterol Sulfate HFA                   

  108(90Base) mcg/Act Aerosol           

             inhale two puffs as needed every 4 hours                           

Unknown      2021

 

                                        History Medications

 

                          Amlodipine Besylate                     2.5mg Tablets 

                  1 by 

mouth twice a day (hold dose for systolic BP <135) 180tabs                      

           Dennis Ballard MD                                      07/15/2021 - 2021

 

                                        Repatha Pushtronex System               

      420mg/3.5ML Solution Cartridge    

              420 mg (3.5 ml) sq qmo 10.5ml       E78.2        Dennis Ballard MD 

06/15/2021 - 2021

 

                          Carvedilol                     3.125mg Tablets        

           1 by mouth 

twice a day     60tabs          I10             Dennis Ballard MD 06/15/2021 

- 2021

 

                                        I25.2

 

                                        R07.9

 

                          Amlodipine Besylate                     2.5mg Tablets 

                  1 by 

mouth every day as needed for elevated BP                                 Unknow

n         2021 - 07/15/2021

 

                          Febuxostat                     40mg Tablets           

        1 by mouth every 

daily                                           Unknown         2021 - 

 

                          Metoclopramide HCL                     10mg Tablets   

                1 by mouth

four times daily                                 Unknown         2021 - 

 

                          Metoprolol Tartrate                     25mg Tablets  

                 1 by 

mouth twice a day                 I10             Unknown         2021 - 0

6/15/2021

 

                                        I25.2

 

                                        R07.9

 

                          Covid-19 vaccine, Unspecified                      Inj

ection                    

                                                Unknown         







Immunizations





                                        Description

 

                                        No Information Available







Vital Signs





                Date            Vital           Result          Comment

 

                07/15/2021 10:44am Weight          155.00 lb        

 

                    Home Weight         153lb               home weight

 

                    Height              61 inches           5'1"

 

                    BMI (Body Mass Index) 29.3 kg/m2           

 

                    Heart Rate          55 /min              

 

                    BP Systolic Sitting 148 mmHg            CBP, adult cuff/Ra

 

                    BP Diastolic Sitting 63 mmHg             CBP, adult cuff/Ra

 

                06/15/2021 12:15pm Weight          157.00 lb        

 

                    Home Weight         157lb               home weight

 

                    Height              61 inches           5'1"

 

                    BMI (Body Mass Index) 29.7 kg/m2           

 

                    Heart Rate          56 /min              

 

                    BP Systolic Sitting 152 mmHg            CBP, adult cuff/Ra

 

                    BP Diastolic Sitting 63 mmHg             CBP, adult cuff/Ra







Results





        Test    Acquired Date Facility Test    Result  H/L     Range   Note

 

                    Lipid Panel         2021          [facility]

nter

           [phone] Triglycerides Level 316 mg/dL  High       <150        

 

             Cholesterol Level 191 mg/dL    Normal       <200          

 

             HDL Cholesterol 43 mg/dL     Normal       >40           

 

             LDL Cholesterol 85 mg/dL     Normal       <100          

 

             Non-HDL-C    148 mg/dL    Normal                     

 

             Cholesterol Risk Ratio 4.441        Normal       <5            

 

                    Laboratory test finding 2021          [facility]

           [phone] Vitamin B12 Level 689 pg/mL  Normal     247-911    1

 

                    CMP                 2021          [facility]

             Albumin Serum/Plasma 4.1                                     

 

             Alt - SGPT   31                                      

 

             Calcium Ser/Plasma Mass/Vol 9.5                                    

 

 

             Carbon Dioxide Ser/Plasm 24                                      

 

             Chloride Serum/Plasma 105                                     

 

             Alkaline Phosphatase 52                                      

 

             Potassium    4.4                                     

 

             Protein Total 7.8                                     

 

             Sodium       140                                     

 

             Ast - Sgot   18                                      

 

             BUN - Urea Nitrogen 30                                      

 

             Glucose      96                                      

 

             Creatinine For GFR 1.69                                    

 

                    CBC without Differential 2021          [facility]

e

             White Blood Count 8.1                                     

 

             Red Blood Count 3.05                                    

 

             Platelets    173                                     

 

             Hemoglobin   11.0                                    

 

             Hematocrit   32.4                                    

 

                    Laboratory test finding 2021          [facility]

             Thyroid Stimulating Hormone 2.105                                  

 







                          1                         VITAMIN B12 NORMAL RANGE



NORMAL                     247 - 911 PG/ML

INDETERMINATE              211 - 246 PG/ML

DEFICIENT              LESS THAN 211 PG/ML









Procedures





                Date            Code            Description     Status

 

                09/10/2021      57218           External ECG Rec>48HR<7D Review 

& Interpretation Completed

 

                09/10/2021      17133           External ECG Rec>48HR<7D Recordi

ng Completed

 

                2021      29610           Echocardiogram 2-D Doppler Color

 Completed

 

                08/10/2021      18706           TM Interpretation & Report Only 

Completed

 

                08/10/2021      61144           Myocardial Imaging (PET) Multipl

e Studies Completed

 

                07/15/2021      35668           Office/Outpatient Established Lo

w MDM 20-29 Min Completed

 

                    07/15/2021          17591               Arterial Pressure Wa

veform Analysis For Assessment Of Central 

Art                                     Completed

 

                06/15/2021      82516           Office/Outpatient New Moderate M

DM 45-59 Minutes Completed

 

                    06/15/2021          87291               Arterial Pressure Wa

veform Analysis For Assessment Of Central 

Art                                     Completed

 

                06/15/2021      61395           ECG 12-Lead     Completed







Medical Devices





                                        Description

 

                                        No Information Available







Encounters





           Type       Date       Location   Provider   Dx         Diagnosis

 

           Office Visit 07/15/2021 11:00a Main Office Dennis Ballard MD I25.1

0     [facility] 

heart disease of native coronary artery w/o ang pctrs

 

                          I10                       Essential (primary) hyperten

ayse

 

           Office Visit 06/15/2021 12:00p Main Office Dennis Ballard MD I25.1

0     [facility] 

heart disease of native coronary artery w/o ang pctrs

 

                          I25.2                     Old myocardial infarction

 

                          Z95.1                     Presence of aortocoronary by

pass graft

 

                          Z95.5                     Presence of coronary angiopl

asty implant and graft

 

                          R07.9                     Chest pain, unspecified

 

                          I10                       Essential (primary) hyperten

ayse

 

                          I35.0                     Nonrheumatic aortic (valve) 

stenosis

 

                          R94.31                    Abnormal electrocardiogram [

ECG] [EKG]

 

                          R55                       Syncope and collapse

 

                          I45.2                     Bifascicular block

 

                          R00.2                     Palpitations

 

                          R42                       Dizziness and giddiness

 

                          E78.2                     Mixed hyperlipidemia

 

                          E66.3                     Overweight

 

                          Z71.3                     Dietary counseling and surve

illance







Assessments





                Date            Code            Description     Provider

 

                09/10/2021      R42             Dizziness and giddiness Holter/E

vent/Telemetry

 

                09/10/2021      R00.2           Palpitations    Holter/Event/Tel

emetry

 

                2021      I35.0           Nonrheumatic aortic (valve) sten

osis ECHO

 

                    08/10/2021          I25.10              Atherosclerotic hear

t disease of native coronary artery 

without angina pectoris                 Cardiac PET

 

                    07/15/2021          I25.10              Atherosclerotic hear

t disease of native coronary artery 

without angina pectoris                 Dennis Ballard MD

 

                07/15/2021      I10             Essential (primary) hypertension

 Dennis Ballard MD

 

                    06/15/2021          I25.10              Atherosclerotic hear

t disease of native coronary artery 

without angina pectoris                 Dennis Ballard MD

 

                06/15/2021      I25.2           Old myocardial infarction Dennis Ballard MD

 

                06/15/2021      Z95.1           Presence of aortocoronary bypass

 graft Dennis Ballard MD

 

                06/15/2021      Z95.5           Presence of coronary angioplasty

 implant and graft Dennis Ballard MD

 

                06/15/2021      R07.9           Chest pain, unspecified Dennis Ballard MD

 

                06/15/2021      I10             Essential (primary) hypertension

 Dennis Ballard MD

 

                06/15/2021      I35.0           Nonrheumatic aortic (valve) sten

osis Dennis Ballard MD

 

                06/15/2021      R94.31          Abnormal electrocardiogram [ECG]

 [EKG] Dennis Ballard MD

 

                06/15/2021      R55             Syncope and collapse Dennis soria MD

 

                06/15/2021      I45.2           Bifascicular block Dennis henderson MD

 

                06/15/2021      R00.2           Palpitations    Dennis Ballard MD

 

                06/15/2021      R42             Dizziness and giddiness Dennis Ballard MD

 

                06/15/2021      E78.2           Mixed hyperlipidemia Dennis soria MD

 

                06/15/2021      E66.3           Overweight      Dennis Ballard MD

 

                06/15/2021      Z71.3           Dietary counseling and surveilla

nce Dennis Ballard MD







Plan of Treatment

Future Appointment(s):* 2021 12:15 pm - Dennis Ballard MD at Main 
  Office

07/15/2021 - Dennis Ballard MD* I25.10 Atherosclerotic heart disease of 
  native coronary artery without angina pectoris* Recommendations:* Now that my 
  office has access to cardiac PET myocardial perfusion imaging which is a 
  superior diagnostic modality in comparison to stress SPECT myocardial perfu
  ayse imaging, I have canceled the stress SPECT myocardial perfusion imaging 
  study that I ordered previously and ordered cardiac PET myocardial perfusion 
  imaging instead (to define her coronary prognosis). Metoprolol tartrate was 
  decreased from 25 mg twice a day down to 12.5 mg twice a day with the hope of 
  providing a faster heart rate which in turn should improve the supply/demand 
  ratio as evidenced in the observed central BP waveform analysis today. 
  Continue nitroglycerin patch, valsartan, amlodipine, nitroglycerin sublingual,
   aspirin, clopidogrel, Ranexa.





* I10 Essential (primary) hypertension* Recommendations:* Metoprolol tartrate 
  was decreased to 12.5 mg twice a day. Continue valsartan and nitroglycerin 
  patch at the current dosages. Amlodipine 2.5 mg twice a day when necessary for
   systolic BP >160 as the patient is currently taking it was changed to 
  amlodipine 2.5 mg twice a day with instruction to hold dose for systolic BP <
  135 mmHg.





* All * New Medication:* Amlodipine Besylate 2.5 mg - 1 by mouth twice a day 
  (hold dose for systolic BP <135)



* Follow up:* Clinic visit in 8 weeks with Dr. Ballard.









Functional Status





                Functional Condition Comment         Date            Status

 

                Independent with all ADL's                                 Activ

e







Mental Status





                                        Description

 

                                        No Information Available







Referrals





                                        Description

 

                                        No Information Available 85

## 2022-10-19 NOTE — ED PROVIDER NOTE - NSFOLLOWUPINSTRUCTIONS_ED_ALL_ED_FT
DISCHARGE INSTRUCTIONS:    Call your local emergency number (911 in the [place]) if:     You have a fever with chills.    You begin vomiting.    You feel faint or become confused.    Call your doctor if:     You see new drainage on your sock.    Your foot becomes red, warm, and swollen.     Your foot ulcer has a bad smell or is draining pus.     You feel pain in a foot that used to have little or no feeling.     You see black or dead tissue in or around your ulcer.     Your ulcer becomes bigger, deeper, or does not heal.     You have questions or concerns about your condition or care.       Prevent diabetic foot ulcers: Good foot care may help prevent ulcers, or keep them from getting worse. Ask someone to help you if you are not able to check your feet by yourself. You or another person may need to do any of the following:     Keep your blood sugar levels under control. Continue the plan for your diabetes that you and your healthcare provider have discussed. Healthy food choices and taking your medicines as directed may help control blood sugars. Contact your healthcare provider if your blood sugar levels are higher than directed.      Wash your feet each day with soap and warm water. Do not use hot water, because this can injure your foot. Dry your feet gently with a towel after you wash them. Dry between and under your toes.       Apply lotion or a moisturizer on your dry feet. Ask your healthcare provider what lotions are best to use. Do not put lotion or moisturizer between your toes. Moisture between your toes could lead to skin breakdown.       Check your feet each day. Look at your whole foot, including the bottom, and between and under your toes. Check for wounds, corns, and calluses. Feel your feet by running your hands along the tops, bottoms, sides, and between your toes. Use a nonbreakable mirror to check your feet if you have trouble seeing the bottoms. Do not try to remove corns or calluses yourself. File or cut your toenails straight across.Diabetic  Foot Care           Protect your feet. Do not walk barefoot or wear your shoes without socks. Check your shoes for rocks or other objects that can hurt your feet. Wear cotton socks to help keep your feet dry. Wear socks without toe seams, or wear them with the seams inside out. Change your socks each day. Do not wear socks that are dirty or damp.      Wear shoes that fit well. Wear shoes that do not rub against any area of your feet. Your shoes should be ½ to ¾ inch (1 to 2 centimeters) longer than your feet. Your shoes should also have extra space around the widest part of your feet. Walking or athletic shoes with laces or straps that adjust are best. Ask your healthcare provider for help to choose shoes that fit you best. Ask him or her if you need to wear an insert, orthotic, or bandage on your feet.      Do not smoke. Nicotine can cause damage to your blood vessels and increases your risk for foot ulcers. Do not use e-cigarettes or smokeless tobacco in place of cigarettes or to help you quit. They still contain nicotine. Ask your healthcare provider for information if you currently smoke and need help quitting.      Know the risks if you choose to drink alcohol. Alcohol can cause your blood sugar levels to be low if you use insulin. Alcohol can cause high blood sugar levels and weight gain if you drink too much. Women 21 years or older and men 65 years or older should limit alcohol to 1 drink a day. Men aged 21 to 64 years should limit alcohol to 2 drinks a day. A drink of alcohol is 12 ounces of beer, 5 ounces of wine, or 1½ ounces of liquor.       Maintain a healthy weight. Ask your healthcare provider how much you should weigh. A healthy weight can help you control your diabetes. Ask him or her to help you create a weight loss plan if you are overweight. Even a 10 to 15 pound weight loss can help you manage your blood sugar level.     Follow up with your healthcare provider or foot specialist as directed: You may need to return often to have your wound checked. Your wound may be measured to see if it is getting smaller. Bring any offloading devices or footwear to your follow-up visits so your healthcare provider or specialist can check them. Write down your questions so you remember to ask them during your visits.

## 2022-10-19 NOTE — ED PROVIDER NOTE - CARE PROVIDER_API CALL
Shad Fisher (DPM)  Podiatric Medicine and Surgery  [address], [address], [address]  [place] [address]  Phone: [phone]  Fax: [phone]  Follow Up Time:

## 2022-10-19 NOTE — ED PROVIDER NOTE - OBJECTIVE STATEMENT
52-year-old male with history of diabetes, hypertension, history of foot surgery presents to the ED complaining of right second toe increased deformity.  Patient states has been going to the gym for last 2 months.  Patient denies any trauma or pain.  No fever chills or weakness.

## 2022-10-20 ENCOUNTER — INPATIENT (INPATIENT)
Facility: HOSPITAL | Age: 52
LOS: 6 days | Discharge: HOME HEALTH | End: 2022-10-27
Attending: HOSPITALIST | Admitting: HOSPITALIST
Payer: MEDICARE

## 2022-10-20 VITALS
HEIGHT: 77 IN | HEART RATE: 84 BPM | OXYGEN SATURATION: 99 % | RESPIRATION RATE: 18 BRPM | DIASTOLIC BLOOD PRESSURE: 82 MMHG | SYSTOLIC BLOOD PRESSURE: 172 MMHG | TEMPERATURE: 98 F

## 2022-10-20 DIAGNOSIS — Z87.891 PERSONAL HISTORY OF NICOTINE DEPENDENCE: ICD-10-CM

## 2022-10-20 DIAGNOSIS — E11.22 TYPE 2 DIABETES MELLITUS WITH DIABETIC CHRONIC KIDNEY DISEASE: ICD-10-CM

## 2022-10-20 DIAGNOSIS — Z79.4 LONG TERM (CURRENT) USE OF INSULIN: ICD-10-CM

## 2022-10-20 DIAGNOSIS — Z79.899 OTHER LONG TERM (CURRENT) DRUG THERAPY: ICD-10-CM

## 2022-10-20 DIAGNOSIS — E11.69 TYPE 2 DIABETES MELLITUS WITH OTHER SPECIFIED COMPLICATION: ICD-10-CM

## 2022-10-20 DIAGNOSIS — E11.40 TYPE 2 DIABETES MELLITUS WITH DIABETIC NEUROPATHY, UNSPECIFIED: ICD-10-CM

## 2022-10-20 DIAGNOSIS — Z87.448 PERSONAL HISTORY OF OTHER DISEASES OF URINARY SYSTEM: ICD-10-CM

## 2022-10-20 DIAGNOSIS — N18.30 CHRONIC KIDNEY DISEASE, STAGE 3 UNSPECIFIED: ICD-10-CM

## 2022-10-20 DIAGNOSIS — I12.9 HYPERTENSIVE CHRONIC KIDNEY DISEASE WITH STAGE 1 THROUGH STAGE 4 CHRONIC KIDNEY DISEASE, OR UNSPECIFIED CHRONIC KIDNEY DISEASE: ICD-10-CM

## 2022-10-20 DIAGNOSIS — Z98.890 OTHER SPECIFIED POSTPROCEDURAL STATES: Chronic | ICD-10-CM

## 2022-10-20 DIAGNOSIS — B35.1 TINEA UNGUIUM: ICD-10-CM

## 2022-10-20 DIAGNOSIS — M00.871 ARTHRITIS DUE TO OTHER BACTERIA, RIGHT ANKLE AND FOOT: ICD-10-CM

## 2022-10-20 DIAGNOSIS — Z79.84 LONG TERM (CURRENT) USE OF ORAL HYPOGLYCEMIC DRUGS: ICD-10-CM

## 2022-10-20 DIAGNOSIS — M86.171 OTHER ACUTE OSTEOMYELITIS, RIGHT ANKLE AND FOOT: ICD-10-CM

## 2022-10-20 PROCEDURE — 99285 EMERGENCY DEPT VISIT HI MDM: CPT

## 2022-10-20 NOTE — ED ADULT NURSE NOTE - OBJECTIVE STATEMENT
pt states "I was called by this hospital to come in for a diabetic foot infection so I can get iv antibiotic"

## 2022-10-20 NOTE — ED ADULT TRIAGE NOTE - CHIEF COMPLAINT QUOTE
pt sts I was called by this hospital to come in for a diabetic foot infection so I can get iv antibiotic

## 2022-10-20 NOTE — ED POST DISCHARGE NOTE - DETAILS
Spoke with patient re: xray findings and the need to return to the ED for abx and podiatry consultation. He will return to the ED

## 2022-10-21 LAB
ALBUMIN SERPL ELPH-MCNC: 4.4 G/DL — SIGNIFICANT CHANGE UP (ref 3.5–5.2)
ALBUMIN SERPL ELPH-MCNC: 4.5 G/DL — SIGNIFICANT CHANGE UP (ref 3.5–5.2)
ALP SERPL-CCNC: 105 U/L — SIGNIFICANT CHANGE UP (ref 30–115)
ALP SERPL-CCNC: 113 U/L — SIGNIFICANT CHANGE UP (ref 30–115)
ALT FLD-CCNC: 16 U/L — SIGNIFICANT CHANGE UP (ref 0–41)
ALT FLD-CCNC: 16 U/L — SIGNIFICANT CHANGE UP (ref 0–41)
ANION GAP SERPL CALC-SCNC: 10 MMOL/L — SIGNIFICANT CHANGE UP (ref 7–14)
ANION GAP SERPL CALC-SCNC: 11 MMOL/L — SIGNIFICANT CHANGE UP (ref 7–14)
AST SERPL-CCNC: 21 U/L — SIGNIFICANT CHANGE UP (ref 0–41)
AST SERPL-CCNC: 22 U/L — SIGNIFICANT CHANGE UP (ref 0–41)
BASOPHILS # BLD AUTO: 0.03 K/UL — SIGNIFICANT CHANGE UP (ref 0–0.2)
BASOPHILS # BLD AUTO: 0.04 K/UL — SIGNIFICANT CHANGE UP (ref 0–0.2)
BASOPHILS NFR BLD AUTO: 0.7 % — SIGNIFICANT CHANGE UP (ref 0–1)
BASOPHILS NFR BLD AUTO: 1 % — SIGNIFICANT CHANGE UP (ref 0–1)
BILIRUB SERPL-MCNC: 0.4 MG/DL — SIGNIFICANT CHANGE UP (ref 0.2–1.2)
BILIRUB SERPL-MCNC: 0.4 MG/DL — SIGNIFICANT CHANGE UP (ref 0.2–1.2)
BUN SERPL-MCNC: 25 MG/DL — HIGH (ref 10–20)
BUN SERPL-MCNC: 26 MG/DL — HIGH (ref 10–20)
CALCIUM SERPL-MCNC: 9.4 MG/DL — SIGNIFICANT CHANGE UP (ref 8.4–10.5)
CALCIUM SERPL-MCNC: 9.6 MG/DL — SIGNIFICANT CHANGE UP (ref 8.4–10.5)
CHLORIDE SERPL-SCNC: 100 MMOL/L — SIGNIFICANT CHANGE UP (ref 98–110)
CHLORIDE SERPL-SCNC: 103 MMOL/L — SIGNIFICANT CHANGE UP (ref 98–110)
CO2 SERPL-SCNC: 27 MMOL/L — SIGNIFICANT CHANGE UP (ref 17–32)
CO2 SERPL-SCNC: 28 MMOL/L — SIGNIFICANT CHANGE UP (ref 17–32)
CREAT SERPL-MCNC: 1.4 MG/DL — SIGNIFICANT CHANGE UP (ref 0.7–1.5)
CREAT SERPL-MCNC: 1.6 MG/DL — HIGH (ref 0.7–1.5)
CRP SERPL-MCNC: 3 MG/L — SIGNIFICANT CHANGE UP
CRP SERPL-MCNC: 3 MG/L — SIGNIFICANT CHANGE UP
EGFR: 51 ML/MIN/1.73M2 — LOW
EGFR: 60 ML/MIN/1.73M2 — SIGNIFICANT CHANGE UP
EOSINOPHIL # BLD AUTO: 0.03 K/UL — SIGNIFICANT CHANGE UP (ref 0–0.7)
EOSINOPHIL # BLD AUTO: 0.09 K/UL — SIGNIFICANT CHANGE UP (ref 0–0.7)
EOSINOPHIL NFR BLD AUTO: 0.7 % — SIGNIFICANT CHANGE UP (ref 0–8)
EOSINOPHIL NFR BLD AUTO: 2.2 % — SIGNIFICANT CHANGE UP (ref 0–8)
ERYTHROCYTE [SEDIMENTATION RATE] IN BLOOD: 3 MM/HR — SIGNIFICANT CHANGE UP (ref 0–10)
ERYTHROCYTE [SEDIMENTATION RATE] IN BLOOD: 7 MM/HR — SIGNIFICANT CHANGE UP (ref 0–10)
FLUAV AG NPH QL: SIGNIFICANT CHANGE UP
FLUBV AG NPH QL: SIGNIFICANT CHANGE UP
GLUCOSE BLDC GLUCOMTR-MCNC: 110 MG/DL — HIGH (ref 70–99)
GLUCOSE BLDC GLUCOMTR-MCNC: 112 MG/DL — HIGH (ref 70–99)
GLUCOSE BLDC GLUCOMTR-MCNC: 113 MG/DL — HIGH (ref 70–99)
GLUCOSE BLDC GLUCOMTR-MCNC: 87 MG/DL — SIGNIFICANT CHANGE UP (ref 70–99)
GLUCOSE BLDC GLUCOMTR-MCNC: 97 MG/DL — SIGNIFICANT CHANGE UP (ref 70–99)
GLUCOSE SERPL-MCNC: 106 MG/DL — HIGH (ref 70–99)
GLUCOSE SERPL-MCNC: 95 MG/DL — SIGNIFICANT CHANGE UP (ref 70–99)
HCT VFR BLD CALC: 37.9 % — LOW (ref 42–52)
HCT VFR BLD CALC: 38 % — LOW (ref 42–52)
HGB BLD-MCNC: 11.5 G/DL — LOW (ref 14–18)
HGB BLD-MCNC: 11.9 G/DL — LOW (ref 14–18)
IMM GRANULOCYTES NFR BLD AUTO: 0.2 % — SIGNIFICANT CHANGE UP (ref 0.1–0.3)
IMM GRANULOCYTES NFR BLD AUTO: 0.2 % — SIGNIFICANT CHANGE UP (ref 0.1–0.3)
LYMPHOCYTES # BLD AUTO: 1.18 K/UL — LOW (ref 1.2–3.4)
LYMPHOCYTES # BLD AUTO: 1.89 K/UL — SIGNIFICANT CHANGE UP (ref 1.2–3.4)
LYMPHOCYTES # BLD AUTO: 25.9 % — SIGNIFICANT CHANGE UP (ref 20.5–51.1)
LYMPHOCYTES # BLD AUTO: 45.8 % — SIGNIFICANT CHANGE UP (ref 20.5–51.1)
MAGNESIUM SERPL-MCNC: 2.1 MG/DL — SIGNIFICANT CHANGE UP (ref 1.8–2.4)
MCHC RBC-ENTMCNC: 23.5 PG — LOW (ref 27–31)
MCHC RBC-ENTMCNC: 23.8 PG — LOW (ref 27–31)
MCHC RBC-ENTMCNC: 30.3 G/DL — LOW (ref 32–37)
MCHC RBC-ENTMCNC: 31.3 G/DL — LOW (ref 32–37)
MCV RBC AUTO: 75.8 FL — LOW (ref 80–94)
MCV RBC AUTO: 77.3 FL — LOW (ref 80–94)
MONOCYTES # BLD AUTO: 0.37 K/UL — SIGNIFICANT CHANGE UP (ref 0.1–0.6)
MONOCYTES # BLD AUTO: 0.48 K/UL — SIGNIFICANT CHANGE UP (ref 0.1–0.6)
MONOCYTES NFR BLD AUTO: 11.6 % — HIGH (ref 1.7–9.3)
MONOCYTES NFR BLD AUTO: 8.1 % — SIGNIFICANT CHANGE UP (ref 1.7–9.3)
NEUTROPHILS # BLD AUTO: 1.62 K/UL — SIGNIFICANT CHANGE UP (ref 1.4–6.5)
NEUTROPHILS # BLD AUTO: 2.93 K/UL — SIGNIFICANT CHANGE UP (ref 1.4–6.5)
NEUTROPHILS NFR BLD AUTO: 39.2 % — LOW (ref 42.2–75.2)
NEUTROPHILS NFR BLD AUTO: 64.4 % — SIGNIFICANT CHANGE UP (ref 42.2–75.2)
NRBC # BLD: 0 /100 WBCS — SIGNIFICANT CHANGE UP (ref 0–0)
NRBC # BLD: 0 /100 WBCS — SIGNIFICANT CHANGE UP (ref 0–0)
PLATELET # BLD AUTO: 210 K/UL — SIGNIFICANT CHANGE UP (ref 130–400)
PLATELET # BLD AUTO: 227 K/UL — SIGNIFICANT CHANGE UP (ref 130–400)
POTASSIUM SERPL-MCNC: 3.8 MMOL/L — SIGNIFICANT CHANGE UP (ref 3.5–5)
POTASSIUM SERPL-MCNC: 4.5 MMOL/L — SIGNIFICANT CHANGE UP (ref 3.5–5)
POTASSIUM SERPL-SCNC: 3.8 MMOL/L — SIGNIFICANT CHANGE UP (ref 3.5–5)
POTASSIUM SERPL-SCNC: 4.5 MMOL/L — SIGNIFICANT CHANGE UP (ref 3.5–5)
PROT SERPL-MCNC: 6.5 G/DL — SIGNIFICANT CHANGE UP (ref 6–8)
PROT SERPL-MCNC: 6.6 G/DL — SIGNIFICANT CHANGE UP (ref 6–8)
RBC # BLD: 4.9 M/UL — SIGNIFICANT CHANGE UP (ref 4.7–6.1)
RBC # BLD: 5.01 M/UL — SIGNIFICANT CHANGE UP (ref 4.7–6.1)
RBC # FLD: 12.9 % — SIGNIFICANT CHANGE UP (ref 11.5–14.5)
RBC # FLD: 13.2 % — SIGNIFICANT CHANGE UP (ref 11.5–14.5)
RSV RNA NPH QL NAA+NON-PROBE: SIGNIFICANT CHANGE UP
SARS-COV-2 RNA SPEC QL NAA+PROBE: SIGNIFICANT CHANGE UP
SODIUM SERPL-SCNC: 139 MMOL/L — SIGNIFICANT CHANGE UP (ref 135–146)
SODIUM SERPL-SCNC: 140 MMOL/L — SIGNIFICANT CHANGE UP (ref 135–146)
WBC # BLD: 4.13 K/UL — LOW (ref 4.8–10.8)
WBC # BLD: 4.55 K/UL — LOW (ref 4.8–10.8)
WBC # FLD AUTO: 4.13 K/UL — LOW (ref 4.8–10.8)
WBC # FLD AUTO: 4.55 K/UL — LOW (ref 4.8–10.8)

## 2022-10-21 PROCEDURE — 73720 MRI LWR EXTREMITY W/O&W/DYE: CPT | Mod: 26,RT

## 2022-10-21 PROCEDURE — 99222 1ST HOSP IP/OBS MODERATE 55: CPT

## 2022-10-21 PROCEDURE — 99223 1ST HOSP IP/OBS HIGH 75: CPT

## 2022-10-21 RX ORDER — SODIUM CHLORIDE 9 MG/ML
500 INJECTION, SOLUTION INTRAVENOUS ONCE
Refills: 0 | Status: COMPLETED | OUTPATIENT
Start: 2022-10-21 | End: 2022-10-21

## 2022-10-21 RX ORDER — METRONIDAZOLE 500 MG
500 TABLET ORAL EVERY 8 HOURS
Refills: 0 | Status: DISCONTINUED | OUTPATIENT
Start: 2022-10-21 | End: 2022-10-24

## 2022-10-21 RX ORDER — CEFTRIAXONE 500 MG/1
2000 INJECTION, POWDER, FOR SOLUTION INTRAMUSCULAR; INTRAVENOUS ONCE
Refills: 0 | Status: COMPLETED | OUTPATIENT
Start: 2022-10-21 | End: 2022-10-21

## 2022-10-21 RX ORDER — CEFAZOLIN SODIUM 1 G
VIAL (EA) INJECTION
Refills: 0 | Status: DISCONTINUED | OUTPATIENT
Start: 2022-10-21 | End: 2022-10-25

## 2022-10-21 RX ORDER — CEFAZOLIN SODIUM 1 G
2000 VIAL (EA) INJECTION EVERY 8 HOURS
Refills: 0 | Status: DISCONTINUED | OUTPATIENT
Start: 2022-10-21 | End: 2022-10-25

## 2022-10-21 RX ORDER — CEFAZOLIN SODIUM 1 G
2000 VIAL (EA) INJECTION ONCE
Refills: 0 | Status: COMPLETED | OUTPATIENT
Start: 2022-10-21 | End: 2022-10-21

## 2022-10-21 RX ORDER — METRONIDAZOLE 500 MG
500 TABLET ORAL ONCE
Refills: 0 | Status: COMPLETED | OUTPATIENT
Start: 2022-10-21 | End: 2022-10-21

## 2022-10-21 RX ADMIN — CEFTRIAXONE 100 MILLIGRAM(S): 500 INJECTION, POWDER, FOR SOLUTION INTRAMUSCULAR; INTRAVENOUS at 02:29

## 2022-10-21 RX ADMIN — Medication 100 MILLIGRAM(S): at 01:00

## 2022-10-21 RX ADMIN — Medication 100 MILLIGRAM(S): at 13:04

## 2022-10-21 RX ADMIN — SODIUM CHLORIDE 1000 MILLILITER(S): 9 INJECTION, SOLUTION INTRAVENOUS at 03:43

## 2022-10-21 RX ADMIN — Medication 100 MILLIGRAM(S): at 22:16

## 2022-10-21 RX ADMIN — Medication 100 MILLIGRAM(S): at 07:44

## 2022-10-21 RX ADMIN — Medication 100 MILLIGRAM(S): at 13:03

## 2022-10-21 RX ADMIN — Medication 100 MILLIGRAM(S): at 21:26

## 2022-10-21 NOTE — H&P ADULT - NSHPLABSRESULTS_GEN_ALL_CORE
.  LABS:                         11.5   4.13  )-----------( 227      ( 20 Oct 2022 23:35 )             37.9     10-20    139  |  100  |  26<H>  ----------------------------<  95  3.8   |  28  |  1.6<H>    Ca    9.4      20 Oct 2022 23:35    TPro  6.6  /  Alb  4.4  /  TBili  0.4  /  DBili  x   /  AST  22  /  ALT  16  /  AlkPhos  113  10-20              RADIOLOGY, EKG & ADDITIONAL TESTS: Reviewed.

## 2022-10-21 NOTE — H&P ADULT - NSHPPHYSICALEXAM_GEN_ALL_CORE
LOS:     VITALS:   T(C): 36.7 (10-21-22 @ 03:34), Max: 36.7 (10-21-22 @ 03:34)  HR: 78 (10-21-22 @ 03:34) (78 - 84)  BP: 95/58 (10-21-22 @ 03:34) (95/58 - 172/82)  RR: 18 (10-21-22 @ 03:34) (18 - 18)  SpO2: 100% (10-21-22 @ 03:34) (99% - 100%)    GENERAL: NAD, lying in bed comfortably  HEAD:  Atraumatic, Normocephalic  EYES: EOMI, PERRLA, conjunctiva and sclera clear  ENT: Moist mucous membranes  NECK: Supple, No JVD  CHEST/LUNG: Clear to auscultation bilaterally; No rales, rhonchi, wheezing, or rubs. Unlabored respirations  HEART: Regular rate and rhythm; No murmurs, rubs, or gallops  ABDOMEN: BSx4; Soft, nontender, nondistended  EXTREMITIES:  2+ Peripheral Pulses, brisk capillary refill. No clubbing, cyanosis, or edema  NERVOUS SYSTEM:  A&Ox3, no focal deficits   SKIN: No rashes or lesions LOS:     VITALS:   T(C): 36.7 (10-21-22 @ 03:34), Max: 36.7 (10-21-22 @ 03:34)  HR: 78 (10-21-22 @ 03:34) (78 - 84)  BP: 95/58 (10-21-22 @ 03:34) (95/58 - 172/82)  RR: 18 (10-21-22 @ 03:34) (18 - 18)  SpO2: 100% (10-21-22 @ 03:34) (99% - 100%)    GENERAL: NAD, lying in bed comfortably  HEAD:  Atraumatic, Normocephalic  EYES: EOMI, PERRLA, conjunctiva and sclera clear  ENT: Moist mucous membranes  NECK: Supple, No JVD  CHEST/LUNG: Clear to auscultation bilaterally; No rales, rhonchi, wheezing, or rubs. Unlabored respirations  HEART: Regular rate and rhythm; No murmurs, rubs, or gallops  ABDOMEN: BSx4; Soft, nontender, nondistended  EXTREMITIES:  no visible foot ulcerations, right foot 2nd digit surgery, no edema, erythema/ swelling  NERVOUS SYSTEM:  A&Ox3, no focal deficits   SKIN: No rashes or lesions

## 2022-10-21 NOTE — ED PROVIDER NOTE - NS ED ROS FT
Constitutional: (-) fever, (-) chills  Eyes: (-) visual changes  ENT: (-) nasal congestions  Cardiovascular: (-) chest pain, (-) syncope  Respiratory: (-) cough, (-) shortness of breath, (-) dyspnea,   Gastrointestinal: (-) vomiting, (-) diarrhea, (-)nausea,  Musculoskeletal: (-) neck pain, (-) back pain, (-) joint pain,  Integumentary: (-) rash, (-) edema, (-) bruises  Neurological: (-) headache, (-) loc, (-) dizziness, (+) tingling/numbness to b/l legs  Peripheral Vascular: (-) leg swelling  :  (-)dysuria,  (-) hematuria  Allergic/Immunologic: (-) pruritus

## 2022-10-21 NOTE — H&P ADULT - NSICDXPASTSURGICALHX_GEN_ALL_CORE_FT
PAST SURGICAL HISTORY:  History of eye surgery      Cartilage Graft Text: The defect edges were debeveled with a #15 scalpel blade.  Given the location of the defect, shape of the defect, the fact the defect involved a full thickness cartilage defect a cartilage graft was deemed most appropriate.  An appropriate donor site was identified, cleansed, and anesthetized. The cartilage graft was then harvested and transferred to the recipient site, oriented appropriately and then sutured into place.  The secondary defect was then repaired using a primary closure.

## 2022-10-21 NOTE — ED PROVIDER NOTE - OBJECTIVE STATEMENT
52-year-old male history of diabetes presenting to ED for evaluation and IV antibiotic patient was seen yesterday for right foot numbness and discomfort. Patient was called back to ED today after x-ray revealed questionable osteomyelitis/septic arthritis. Patient denies any fevers or chills or other symptoms

## 2022-10-21 NOTE — H&P ADULT - NSHPREVIEWOFSYSTEMS_GEN_ALL_CORE

## 2022-10-21 NOTE — CONSULT NOTE ADULT - CONSULT REASON
[facility] [address] [address]  [phone]        2022    REPORT OF WORK ABILITY      PATIENT DATA  Employee Name: Thiago Mayorga        : 1997   [id number]  Work related injury: YES  Today's date: 2022  Date of injury: 2022     PROVIDER EVALUATION: Please fill in as needed.  Please give copy to employee for employer.  1. Diagnosis: Neck strain  2. Treatment: Rest, ice, Ibuprofen, massage, stretching  3. Medication: Ibuprofen  NOTE: When ordering a medication, [place] Rules require Work Comp or WC on prescriptions.  4. Return to work date: May return today with the following: * Restricted bending-neck limit : 10 degrees.   5. Restrictions in place until 22.    RESTRICTIONS: Unlimited unless listed.  Restrictions apply to home and leisure also.  If work within restrictions is not available, the employee is totally disabled.    Medical Examiner: Linda Marie PA-C        Next appointment: 4 days if no improvement with family practive    CC: Employer, Managed Care Plan/Payor, Patient    
Right foot check
Osteomyelitis

## 2022-10-21 NOTE — ED PROVIDER NOTE - PHYSICAL EXAMINATION
VITAL SIGNS: I have reviewed nursing notes and confirm.  CONSTITUTIONAL: in no acute distress.  SKIN: Skin exam is warm and dry,   HEAD: Normocephalic; atraumatic.  EYES:  EOM intact; conjunctiva and sclera clear.  ENT: No nasal discharge; airway clear.   NECK: Supple; non tender.  CARD: S1, S2 normal; no murmurs, gallops, or rubs. Regular rate and rhythm.  RESP: No wheezes, rales or rhonchi. Speaking in full sentences.   ABD: Normal bowel sounds; soft; non-distended; non-tender; No rebound or guarding. No CVA tenderness.  EXT: Normal ROM. no visible foot ulcerations, right foot 2nd digit surgery, no edema, erythema/ swelling  NEURO: decreased sensation to legs b/l  PSYCH: Cooperative, appropriate.

## 2022-10-21 NOTE — ED PROVIDER NOTE - NS ED ATTENDING STATEMENT MOD
I have seen and examined this patient and fully participated in the care of this patient as the teaching attending.  The service was shared with the TANIA.  I reviewed and verified the documentation and independently performed the documented:

## 2022-10-21 NOTE — ED PROVIDER NOTE - CLINICAL SUMMARY MEDICAL DECISION MAKING FREE TEXT BOX
Patient presented for management of osteomyelitis seen on x-ray done yesterday.  Will be admitted for further management.

## 2022-10-21 NOTE — ED PROVIDER NOTE - ATTENDING CONTRIBUTION TO CARE
I personally evaluated the patient. I reviewed the Resident’s or Physician Assistant’s note (as assigned above), and agree with the findings and plan except as documented in my note.  Patient presented for management of right foot pain with a diagnosis of osteomyelitis myelitis on x-ray.  Patient was seen in the ED yesterday and discharged pending imaging results and called back to return today.  Patient has diabetic and has a history of amputation.  Denies fevers.  VSS, non toxic appearing, NAD, Head NCAT, MMM, neck supple, normal ROM, normal s1s2, lungs ctab, abd s/nt/nd, no guarding or rebound, extremities with amputation of her right second toe, tenderness with palpation on the ventral surface of the foot, no open wounds, neurovascular intact, AAO x 3, GCS 15, neuro grossly normal. No acute skin lesions. Plan is labs, and IV antibiotics, pain control as needed and likely admit.

## 2022-10-21 NOTE — H&P ADULT - TIME BILLING
HPI as above.  Interval history: Pt seen and examined at bedside. No cp or sob.    Vital Signs (24 Hrs):  T(C): 36.4 (10-21-22 @ 07:49), Max: 36.7 (10-21-22 @ 03:34)  HR: 76 (10-21-22 @ 07:49) (67 - 84)  BP: 143/84 (10-21-22 @ 07:49) (95/58 - 172/82)  RR: 18 (10-21-22 @ 07:49) (18 - 18)  SpO2: 100% (10-21-22 @ 07:49) (99% - 100%)  Wt(kg): --  Daily Height in cm: 195.58 (20 Oct 2022 21:26)    Daily     I&O's Summary    PHYSICAL EXAM:  GENERAL: NAD, well-developed  HEAD:  Atraumatic, Normocephalic  EYES: EOMI, PERRLA, conjunctiva and sclera clear  NECK: Supple, No JVD  CHEST/LUNG: Clear to auscultation bilaterally; No wheeze  HEART: Regular rate and rhythm; No murmurs, rubs, or gallops  ABDOMEN: Soft, Nontender, Nondistended; Bowel sounds present  EXTREMITIES:  2+ Peripheral Pulses, No clubbing, cyanosis, or edema  PSYCH: AAOx3  NEUROLOGY: non-focal  SKIN: No rashes or lesions, no wound on lower ext, dry skin  Labs reviewed  Imaging reviewed independently and reviewed read  < from: Xray Foot AP + Lateral + Oblique, Right (10.19.22 @ 09:07) >    IMPRESSION:    Destruction of the second proximal interphalangeal joint which could   reflect septic arthritis. Correlate with MRI. Fragmentation the medial   sesamoid. Questionable chronic changes. Acute fracture cannot be excluded.    Dystrophic calcifications in the region of the plantar fascia.    < end of copied text >      Plan  # suspected OM/ Septic arthritis right second toe s/p debridement and long course of abx - crp/esr 3- ID recc, pending MRI for OM, follow up podiatry   #CKD stage 2- stable  #rest as above    #Progress Note Handoff  Pending (specify): follow up podiatry, mri, ID   Disposition: 24-48 hrs depending on MRI

## 2022-10-21 NOTE — H&P ADULT - ASSESSMENT
52-year-old male history of diabetes presenting to ED for evaluation and IV antibiotic patient was seen yesterday for right foot numbness and discomfort. He reported  right second toe increased deformity.  Patient states he's been going to the gym for last 2 months, denies any trauma or pain.  He was called back to ED today after x-ray revealed questionable osteomyelitis/septic arthritis. Patient denies any fevers or chills or other symptoms,    In the ED: /82, HR 84, RR 18, T 97.8, spo2 99% on RA. WBC 4.13, hgb 11.5, cr 1.6. Xray right foot showed Destruction of the second proximal interphalangeal joint which could reflect septic arthritis. Fragmentation the medial sesamoid. Questionable chronic changes. Acute fracture cannot be excluded. Dystrophic calcifications in the region of the plantar fascia.    #R 2nd toe infection  #Suspected Septic Arthritis   -s/p Rocephin and flagyl in the ED   -f/u ESR, CRP, blood cx  - MRI R  foot   -Podiatry eval for drainage/debridement   -ID eval       #Hx of DM       DVT ppx: Lovenox  Diet: Carb consistent   Dispo: Floor   Code: Full  52-year-old male history of diabetes presenting to ED for evaluation and IV antibiotic patient was seen yesterday for right foot numbness and discomfort. He reported  right second toe increased deformity.  Patient states he's been going to the gym for last 2 months, denies any trauma or pain.  He was called back to ED today after x-ray revealed questionable osteomyelitis/septic arthritis. Patient denies any fevers or chills or other symptoms,    In the ED: /82, HR 84, RR 18, T 97.8, spo2 99% on RA. WBC 4.13, hgb 11.5, cr 1.6. Xray right foot showed Destruction of the second proximal interphalangeal joint which could reflect septic arthritis. Fragmentation the medial sesamoid. Questionable chronic changes. Acute fracture cannot be excluded. Dystrophic calcifications in the region of the plantar fascia.    #R 2nd toe infection  #Suspected Septic Arthritis   #Has hx of OM/ Septic arthritis right second toe s/p debridement and long course of abx   -s/p Rocephin and flagyl in the ED   -f/u ESR, CRP, blood cx  - MRI R  foot w/wo   -Podiatry eval for drainage/debridement   - Will start ancef 2G  -F/u ID eval     #TONYA vs CKD   - Cr 1.6 on 10/20  -was 1.3 in 2021  -f/u BMP      #Hx of DM   -f/u A1c, lipid profile   -start insulin basal bolus if finger stick >180       Please call [facility] in the AM to verify pt's medrec. They're closed overnight     DVT ppx: On hold for possible podiatry intervention   Diet: Carb consistent   Dispo: Floor   Code: Full

## 2022-10-21 NOTE — CONSULT NOTE ADULT - SUBJECTIVE AND OBJECTIVE BOX
MAUREEN CASTAÑEDA  52y, Male  Allergy: No Known Allergies      CHIEF COMPLAINT: Osteomyelitis (21 Oct 2022 04:18)      HPI:  52-year-old male history of diabetes,  OM/ Septic arthritis right second toe s/p debridement  presenting to ED for evaluation and IV antibiotic. Patient was seen yesterday for right foot numbness and discomfort. He reported  right second toe increased deformity.  Patient states he's been going to the gym for last 2 months, denies any trauma or pain.  He was called back to ED today after x-ray revealed questionable osteomyelitis/septic arthritis. Patient denies any fevers or chills or other symptoms,    In the ED: /82, HR 84, RR 18, T 97.8, spo2 99% on RA. WBC 4.13, hgb 11.5, cr 1.6. Xray right foot showed Destruction of the second proximal interphalangeal joint which could reflect septic arthritis. Fragmentation the medial sesamoid. Questionable chronic changes. Acute fracture cannot be excluded. Dystrophic calcifications in the region of the plantar fascia.       (21 Oct 2022 04:18)    FAMILY HISTORY:    PAST MEDICAL & SURGICAL HISTORY:  Diabetes      Hypertension      History of penile disorder      Gallstones      History of eye surgery          SOCIAL HISTORY  Social History:        ROS  General: Denies fevers, chills, nightsweats, weight loss  HEENT: Denies headache, rhinorrhea, sore throat, eye pain  CV: Denies CP, palpitations  PULM: Denies SOB, cough  GI: Denies abdominal pain, diarrhea  : Denies dysuria, hematuria  MSK: Denies arthralgias  SKIN: Denies rash   NEURO: Denies paresthesias, weakness  PSYCH: Denies depression    VITALS:  T(F): 97.5, Max: 98 (10-21-22 @ 03:34)  HR: 76  BP: 143/84  RR: 18Vital Signs Last 24 Hrs  T(C): 36.4 (21 Oct 2022 07:49), Max: 36.7 (21 Oct 2022 03:34)  T(F): 97.5 (21 Oct 2022 07:49), Max: 98 (21 Oct 2022 03:34)  HR: 76 (21 Oct 2022 07:49) (67 - 84)  BP: 143/84 (21 Oct 2022 07:49) (95/58 - 172/82)  BP(mean): 108 (21 Oct 2022 07:49) (108 - 108)  RR: 18 (21 Oct 2022 07:49) (18 - 18)  SpO2: 100% (21 Oct 2022 07:49) (99% - 100%)    Parameters below as of 21 Oct 2022 07:49  Patient On (Oxygen Delivery Method): room air        PHYSICAL EXAM:  Gen: NAD, resting in bed  HEENT: Normocephalic, atraumatic  Neck: supple, no lymphadenopathy  CV: Regular rate & regular rhythm  Lungs: decreased BS at bases, no fremitus  Abdomen: Soft, BS present  Ext: S/P Right 2nd toe surgery  Neuro: non focal, awake  Skin: no rash, no erythema    TESTS & MEASUREMENTS:                        11.5   4.13  )-----------( 227      ( 20 Oct 2022 23:35 )             37.9     10-20    139  |  100  |  26<H>  ----------------------------<  95  3.8   |  28  |  1.6<H>    Ca    9.4      20 Oct 2022 23:35    TPro  6.6  /  Alb  4.4  /  TBili  0.4  /  DBili  x   /  AST  22  /  ALT  16  /  AlkPhos  113  10-20      LIVER FUNCTIONS - ( 20 Oct 2022 23:35 )  Alb: 4.4 g/dL / Pro: 6.6 g/dL / ALK PHOS: 113 U/L / ALT: 16 U/L / AST: 22 U/L / GGT: x                     INFECTIOUS DISEASES TESTING      RADIOLOGY & ADDITIONAL TESTS:  I have personally reviewed the last Chest xray  CXR      CT      CARDIOLOGY TESTING      MEDICATIONS  ceFAZolin   IVPB   ceFAZolin   IVPB 2000      ANTIBIOTICS:  ceFAZolin   IVPB      ceFAZolin   IVPB 2000 milliGRAM(s) IV Intermittent every 8 hours      All available historical data has been reviewed        
Podiatry Consult Note    Subjective:  MAUREEN CASTAÑEDA  Seen Bedside 52y Male  .   Patient is a 52y old  Male who presents with a chief complaint of Osteomyelitis (21 Oct 2022 08:08)    Podiatry:  52 year old male with pmhx of DM, neuropathy, prior ulcerations, and sx for previous infected wounds. Presents with pain and swelling to right foot 2nd digit. Patient says his toe started to look swollen a few weeks ago. His initial pain brought him to the ED for eval but states he is not currently in any pain. Patient says he has not had any wounds or sores on his foot in several years. Denies f/c/n/v/sob. Has no other pedal complaints at this time.     HPI:  52-year-old male history of diabetes,  OM/ Septic arthritis right second toe s/p debridement  presenting to ED for evaluation and IV antibiotic. Patient was seen yesterday for right foot numbness and discomfort. He reported  right second toe increased deformity.  Patient states he's been going to the gym for last 2 months, denies any trauma or pain.  He was called back to ED today after x-ray revealed questionable osteomyelitis/septic arthritis. Patient denies any fevers or chills or other symptoms,    In the ED: /82, HR 84, RR 18, T 97.8, spo2 99% on RA. WBC 4.13, hgb 11.5, cr 1.6. Xray right foot showed Destruction of the second proximal interphalangeal joint which could reflect septic arthritis. Fragmentation the medial sesamoid. Questionable chronic changes. Acute fracture cannot be excluded. Dystrophic calcifications in the region of the plantar fascia.       (21 Oct 2022 04:18)      Past Medical History and Surgical History  PAST MEDICAL & SURGICAL HISTORY:  Diabetes      Hypertension      History of penile disorder      Gallstones      History of eye surgery           Review of Systems:  [X] Ten point review of systems is otherwise negative except as noted     Objective:  Vital Signs Last 24 Hrs  T(C): 36.4 (21 Oct 2022 07:49), Max: 36.7 (21 Oct 2022 03:34)  T(F): 97.5 (21 Oct 2022 07:49), Max: 98 (21 Oct 2022 03:34)  HR: 76 (21 Oct 2022 07:49) (67 - 84)  BP: 143/84 (21 Oct 2022 07:49) (95/58 - 172/82)  BP(mean): 108 (21 Oct 2022 07:49) (108 - 108)  RR: 18 (21 Oct 2022 07:49) (18 - 18)  SpO2: 100% (21 Oct 2022 07:49) (99% - 100%)    Parameters below as of 21 Oct 2022 07:49  Patient On (Oxygen Delivery Method): room air                            11.9   4.55  )-----------( 210      ( 21 Oct 2022 07:50 )             38.0                 10-21    140  |  103  |  25<H>  ----------------------------<  106<H>  4.5   |  27  |  1.4    Ca    9.6      21 Oct 2022 07:50  Mg     2.1     10-21    TPro  6.5  /  Alb  4.5  /  TBili  0.4  /  DBili  x   /  AST  21  /  ALT  16  /  AlkPhos  105  10-21        Physical Exam - Lower Extremity Focused:   Derm:   Swollen right 2nd digit with no wounds, abrasions, or ulcerations present.   B/l heel xerosis  No callus, ecchymosis, or ulcerations present b/l   Interdigital spaces clean/dry/intact; no macerations     Vascular: DP and PT Pulses palpable;  Foot is Warm to Warm to the touch; Capillary Refill Time < 3 Seconds;    Neuro: Protective Sensation Diminished / Moderately Neuropathic   MSK: No Pain On Palpation at right foot 2nd digit     Assessment:  Right foot 2nd digit w/suspected OM/septic arthritis    Plan:  Chart reviewed and Patient evaluated. All Questions and Concerns Addressed and Answered  XR Imaging R Foot; Results as stated above   No culturable material present; no wounds present  Weight Bearing Status; FWB b/l   MRI-R Foot; R/o OM vs Septic arthritis 2nd digit; Pending read  Continue with IV abx per ID rec;   Possible Surgical Debridement; Pending MR-Right foot;   Will f/u with final plan pending MR-Right foot;   Discussed Plan w/ Dr. Fisher    Podiatry

## 2022-10-21 NOTE — H&P ADULT - HISTORY OF PRESENT ILLNESS
52-year-old male history of diabetes presenting to ED for evaluation and IV antibiotic patient was seen yesterday for right foot numbness and discomfort. He reported  right second toe increased deformity.  Patient states he's been going to the gym for last 2 months, denies any trauma or pain.  He was called back to ED today after x-ray revealed questionable osteomyelitis/septic arthritis. Patient denies any fevers or chills or other symptoms,    In the ED: /82, HR 84, RR 18, T 97.8, spo2 99% on RA. WBC 4.13, hgb 11.5, cr 1.6. Xray right foot showed Destruction of the second proximal interphalangeal joint which could reflect septic arthritis. Fragmentation the medial sesamoid. Questionable chronic changes. Acute fracture cannot be excluded. Dystrophic calcifications in the region of the plantar fascia.       52-year-old male history of diabetes,  OM/ Septic arthritis right second toe s/p debridement  presenting to ED for evaluation and IV antibiotic. Patient was seen yesterday for right foot numbness and discomfort. He reported  right second toe increased deformity.  Patient states he's been going to the gym for last 2 months, denies any trauma or pain.  He was called back to ED today after x-ray revealed questionable osteomyelitis/septic arthritis. Patient denies any fevers or chills or other symptoms,    In the ED: /82, HR 84, RR 18, T 97.8, spo2 99% on RA. WBC 4.13, hgb 11.5, cr 1.6. Xray right foot showed Destruction of the second proximal interphalangeal joint which could reflect septic arthritis. Fragmentation the medial sesamoid. Questionable chronic changes. Acute fracture cannot be excluded. Dystrophic calcifications in the region of the plantar fascia.

## 2022-10-22 LAB
A1C WITH ESTIMATED AVERAGE GLUCOSE RESULT: 4.7 % — SIGNIFICANT CHANGE UP (ref 4–5.6)
CHOLEST SERPL-MCNC: 135 MG/DL — SIGNIFICANT CHANGE UP
ESTIMATED AVERAGE GLUCOSE: 88 MG/DL — SIGNIFICANT CHANGE UP (ref 68–114)
GLUCOSE BLDC GLUCOMTR-MCNC: 102 MG/DL — HIGH (ref 70–99)
GLUCOSE BLDC GLUCOMTR-MCNC: 104 MG/DL — HIGH (ref 70–99)
GLUCOSE BLDC GLUCOMTR-MCNC: 82 MG/DL — SIGNIFICANT CHANGE UP (ref 70–99)
GLUCOSE BLDC GLUCOMTR-MCNC: 92 MG/DL — SIGNIFICANT CHANGE UP (ref 70–99)
HDLC SERPL-MCNC: 43 MG/DL — SIGNIFICANT CHANGE UP
LIPID PNL WITH DIRECT LDL SERPL: 77 MG/DL — SIGNIFICANT CHANGE UP
NON HDL CHOLESTEROL: 92 MG/DL — SIGNIFICANT CHANGE UP
TRIGL SERPL-MCNC: 76 MG/DL — SIGNIFICANT CHANGE UP

## 2022-10-22 PROCEDURE — 99232 SBSQ HOSP IP/OBS MODERATE 35: CPT

## 2022-10-22 RX ORDER — ENOXAPARIN SODIUM 100 MG/ML
40 INJECTION SUBCUTANEOUS EVERY 24 HOURS
Refills: 0 | Status: DISCONTINUED | OUTPATIENT
Start: 2022-10-22 | End: 2022-10-25

## 2022-10-22 RX ORDER — CALCIUM CARBONATE 500(1250)
2 TABLET ORAL ONCE
Refills: 0 | Status: COMPLETED | OUTPATIENT
Start: 2022-10-22 | End: 2022-10-22

## 2022-10-22 RX ADMIN — Medication 2 TABLET(S): at 22:00

## 2022-10-22 RX ADMIN — Medication 100 MILLIGRAM(S): at 05:09

## 2022-10-22 RX ADMIN — Medication 100 MILLIGRAM(S): at 13:01

## 2022-10-22 RX ADMIN — Medication 100 MILLIGRAM(S): at 13:00

## 2022-10-22 RX ADMIN — Medication 100 MILLIGRAM(S): at 21:47

## 2022-10-22 NOTE — PROGRESS NOTE ADULT - SUBJECTIVE AND OBJECTIVE BOX
Pt seen and examined at bedside.          VITAL SIGNS (Last 24 hrs):  T(C): 36.3 (10-22-22 @ 05:24), Max: 36.5 (10-21-22 @ 16:10)  HR: 75 (10-22-22 @ 05:24) (73 - 75)  BP: 145/74 (10-22-22 @ 05:24) (141/85 - 169/80)  RR: 18 (10-22-22 @ 05:24) (18 - 19)  SpO2: 100% (10-21-22 @ 16:10) (100% - 100%)        I&O's Summary      PHYSICAL EXAM:  GENERAL: NAD   HEAD:  Atraumatic, Normocephalic  EYES: conjunctiva and sclera clear  NECK: Supple, No JVD  CHEST/LUNG: Clear to auscultation bilaterally; No wheeze  HEART: Regular rate and rhythm; No murmurs, rubs, or gallops  ABDOMEN: Soft, Nontender, Nondistended; Bowel sounds present  EXTREMITIES:  right toe swollen   PSYCH: AAOx3  NEUROLOGY: non-focal  SKIN: No rashes or lesions    Labs Reviewed       CBC Full  -  ( 21 Oct 2022 07:50 )  WBC Count : 4.55 K/uL  Hemoglobin : 11.9 g/dL  Hematocrit : 38.0 %  Platelet Count - Automated : 210 K/uL  Mean Cell Volume : 75.8 fL  Mean Cell Hemoglobin : 23.8 pg  Mean Cell Hemoglobin Concentration : 31.3 g/dL  Auto Neutrophil # : 2.93 K/uL  Auto Lymphocyte # : 1.18 K/uL  Auto Monocyte # : 0.37 K/uL  Auto Eosinophil # : 0.03 K/uL  Auto Basophil # : 0.03 K/uL  Auto Neutrophil % : 64.4 %  Auto Lymphocyte % : 25.9 %  Auto Monocyte % : 8.1 %  Auto Eosinophil % : 0.7 %  Auto Basophil % : 0.7 %    BMP:    10-21 @ 07:50    Blood Urea Nitrogen - 25  Calcium - 9.6  Carbon Dioxide - 27  Chloride - 103  Creatinine - 1.4  Glucose - 106  Potassium - 4.5  Sodium - 140           Urine Culture:  10-20 @ 23:35 Urine culture: --    Culture Results:   No growth to date.  Method Type: --  Organism: --  Organism Identification: --  Specimen Source: .Blood Blood-Peripheral        Imaging reviewed    EKG reviewed    Tele Reviewed    MEDICATIONS  (STANDING):  ceFAZolin   IVPB      ceFAZolin   IVPB 2000 milliGRAM(s) IV Intermittent every 8 hours  enoxaparin Injectable 40 milliGRAM(s) SubCutaneous every 24 hours  metroNIDAZOLE  IVPB 500 milliGRAM(s) IV Intermittent every 8 hours    MEDICATIONS  (PRN):

## 2022-10-22 NOTE — PROGRESS NOTE ADULT - ASSESSMENT
52-year-old male history of diabetes presenting to ED for right toe pain.   Found to have acute septic arthritis/acute osteo of the second proximal interphalangeal joint.    #Septic arthritis/osteomyelitis of the second proximal interphalangeal joint  #Sepsis ruled out admission   #Has hx of OM/ Septic arthritis right second toe s/p debridement and long course of abx   - ESR, CRP wnl   - MRI R  foot Septic arthritis/osteomyelitis of the second proximal interphalangeal joint.  - Podiatry f/u   - C/w Ancef and Flagyl   - ID f/u     #TONYA vs CKD 3   - resolving     #Hx of DM   -start insulin basal bolus if finger stick >180     DVT ppx: Lovenox    Pending: ID and podiatry f/u   Plan of care d/w patient   Dispo: Home

## 2022-10-22 NOTE — PROGRESS NOTE ADULT - SUBJECTIVE AND OBJECTIVE BOX
Podiatry Progress Note    Subjective:  MAUREEN CASTAÑEDA is a  52y Male.   Seen bedside.   Patient is a 52y old  Male who presents with a chief complaint of Osteomyelitis (22 Oct 2022 10:52)      Podiatry:  52 year old male with pmhx of DM, neuropathy, prior ulcerations, and sx for previous infected wounds. Presents with pain and swelling to right foot 2nd digit. Patient says his toe started to look swollen a few weeks ago. His initial pain brought him to the ED for eval but states he is not currently in any pain. Patient says he has not had any wounds or sores on his foot in several years. Denies f/c/n/v/sob. Has no other pedal complaints at this time.         Past Medical History and Surgical History  PAST MEDICAL & SURGICAL HISTORY:  Diabetes      Hypertension      History of penile disorder      Gallstones      History of eye surgery           Objective:  Vital Signs Last 24 Hrs  T(C): 37.1 (22 Oct 2022 14:34), Max: 37.1 (22 Oct 2022 14:34)  T(F): 98.7 (22 Oct 2022 14:34), Max: 98.7 (22 Oct 2022 14:34)  HR: 79 (22 Oct 2022 14:34) (73 - 79)  BP: 143/74 (22 Oct 2022 14:34) (141/85 - 169/80)  BP(mean): --  RR: 18 (22 Oct 2022 14:34) (18 - 19)  SpO2: 100% (21 Oct 2022 16:10) (100% - 100%)    Parameters below as of 21 Oct 2022 16:10  Patient On (Oxygen Delivery Method): room air                            11.9   4.55  )-----------( 210      ( 21 Oct 2022 07:50 )             38.0                 10-21    140  |  103  |  25<H>  ----------------------------<  106<H>  4.5   |  27  |  1.4    Ca    9.6      21 Oct 2022 07:50  Mg     2.1     10-21    TPro  6.5  /  Alb  4.5  /  TBili  0.4  /  DBili  x   /  AST  21  /  ALT  16  /  AlkPhos  105  10-21      Radiology:    XR-RightFoot 10/21/22    IMPRESSION:  Destruction of the second proximal interphalangeal joint which could reflect septic arthritis. Correlate with MRI. Fragmentation the medial sesamoid. Questionable chronic changes. Acute fracture cannot be excluded.  Dystrophic calcifications in the region of the plantar fascia.    MR-RightFoot 10/21/22  Impression:  Septic arthritis/osteomyelitis of the second proximal interphalangeal joint.  Osteoarthritis of the forefoot joints.  2 cm adventitial bursitis plantar to the first MTP.    Physical Exam - Lower Extremity Focused:   Derm:   Swollen right 2nd digit with no wounds, abrasions, or ulcerations present.   B/l heel xerosis  No callus, ecchymosis, or ulcerations present b/l   Interdigital spaces clean/dry/intact; no macerations     Vascular: DP and PT Pulses palpable;  Foot is Warm to Warm to the touch; Capillary Refill Time < 3 Seconds;    Neuro: Protective Sensation Diminished / Moderately Neuropathic   MSK: No Pain On Palpation at right foot 2nd digit     Assessment:  Right foot Septic arthritis/osteomyelitis of the second proximal interphalangeal joint    Plan:  Chart reviewed and Patient evaluated. All Questions and Concerns Addressed and Answered  XR Imaging R Foot; Results as stated above   MRI-R Foot; results as stated above   No culturable material present; no wounds present  Continue with IV abx per ID rec;   Weight Bearing Status; FWB b/l   Podiatric surgical intervention indicated at this time;  Plan for sx the week of 10/24/22 - 10/28/22; Will f/u with final date and time Monday morning  Sx planned for excisional debridement of soft tissue and bone with possible partial amputation of 2nd digit right foot;  Request Medical clearance  Discussed Plan w/ Dr. Fisher    Podiatry

## 2022-10-23 LAB
ALBUMIN SERPL ELPH-MCNC: 4.4 G/DL — SIGNIFICANT CHANGE UP (ref 3.5–5.2)
ALP SERPL-CCNC: 113 U/L — SIGNIFICANT CHANGE UP (ref 30–115)
ALT FLD-CCNC: 10 U/L — SIGNIFICANT CHANGE UP (ref 0–41)
ANION GAP SERPL CALC-SCNC: 11 MMOL/L — SIGNIFICANT CHANGE UP (ref 7–14)
AST SERPL-CCNC: 18 U/L — SIGNIFICANT CHANGE UP (ref 0–41)
BILIRUB SERPL-MCNC: 0.3 MG/DL — SIGNIFICANT CHANGE UP (ref 0.2–1.2)
BUN SERPL-MCNC: 20 MG/DL — SIGNIFICANT CHANGE UP (ref 10–20)
CALCIUM SERPL-MCNC: 9.3 MG/DL — SIGNIFICANT CHANGE UP (ref 8.4–10.5)
CHLORIDE SERPL-SCNC: 106 MMOL/L — SIGNIFICANT CHANGE UP (ref 98–110)
CO2 SERPL-SCNC: 25 MMOL/L — SIGNIFICANT CHANGE UP (ref 17–32)
CREAT SERPL-MCNC: 1.5 MG/DL — SIGNIFICANT CHANGE UP (ref 0.7–1.5)
EGFR: 55 ML/MIN/1.73M2 — LOW
GLUCOSE BLDC GLUCOMTR-MCNC: 102 MG/DL — HIGH (ref 70–99)
GLUCOSE BLDC GLUCOMTR-MCNC: 103 MG/DL — HIGH (ref 70–99)
GLUCOSE BLDC GLUCOMTR-MCNC: 110 MG/DL — HIGH (ref 70–99)
GLUCOSE BLDC GLUCOMTR-MCNC: 88 MG/DL — SIGNIFICANT CHANGE UP (ref 70–99)
GLUCOSE SERPL-MCNC: 129 MG/DL — HIGH (ref 70–99)
HCT VFR BLD CALC: 40.6 % — LOW (ref 42–52)
HGB BLD-MCNC: 12.2 G/DL — LOW (ref 14–18)
MCHC RBC-ENTMCNC: 23.6 PG — LOW (ref 27–31)
MCHC RBC-ENTMCNC: 30 G/DL — LOW (ref 32–37)
MCV RBC AUTO: 78.5 FL — LOW (ref 80–94)
NRBC # BLD: 0 /100 WBCS — SIGNIFICANT CHANGE UP (ref 0–0)
PLATELET # BLD AUTO: 242 K/UL — SIGNIFICANT CHANGE UP (ref 130–400)
POTASSIUM SERPL-MCNC: 4.3 MMOL/L — SIGNIFICANT CHANGE UP (ref 3.5–5)
POTASSIUM SERPL-SCNC: 4.3 MMOL/L — SIGNIFICANT CHANGE UP (ref 3.5–5)
PROT SERPL-MCNC: 6.7 G/DL — SIGNIFICANT CHANGE UP (ref 6–8)
RBC # BLD: 5.17 M/UL — SIGNIFICANT CHANGE UP (ref 4.7–6.1)
RBC # FLD: 13.2 % — SIGNIFICANT CHANGE UP (ref 11.5–14.5)
SODIUM SERPL-SCNC: 142 MMOL/L — SIGNIFICANT CHANGE UP (ref 135–146)
WBC # BLD: 4.47 K/UL — LOW (ref 4.8–10.8)
WBC # FLD AUTO: 4.47 K/UL — LOW (ref 4.8–10.8)

## 2022-10-23 PROCEDURE — 99232 SBSQ HOSP IP/OBS MODERATE 35: CPT

## 2022-10-23 RX ADMIN — Medication 100 MILLIGRAM(S): at 21:32

## 2022-10-23 RX ADMIN — Medication 100 MILLIGRAM(S): at 14:32

## 2022-10-23 RX ADMIN — ENOXAPARIN SODIUM 40 MILLIGRAM(S): 100 INJECTION SUBCUTANEOUS at 05:16

## 2022-10-23 RX ADMIN — Medication 100 MILLIGRAM(S): at 14:31

## 2022-10-23 RX ADMIN — Medication 100 MILLIGRAM(S): at 05:16

## 2022-10-23 NOTE — PROGRESS NOTE ADULT - SUBJECTIVE AND OBJECTIVE BOX
Pt seen and examined at bedside.         VITAL SIGNS (Last 24 hrs):  T(C): 36.6 (10-23-22 @ 05:24), Max: 37.1 (10-22-22 @ 14:34)  HR: 73 (10-23-22 @ 05:24) (70 - 79)  BP: 142/83 (10-23-22 @ 05:24) (141/75 - 143/74)  RR: 18 (10-23-22 @ 05:24) (18 - 18)  SpO2: --  Wt(kg): --  Daily     Daily     I&O's Summary      PHYSICAL EXAM:  GENERAL: NAD   HEAD:  Atraumatic, Normocephalic  EYES:  conjunctiva and sclera clear  NECK: Supple, No JVD  CHEST/LUNG: Clear to auscultation bilaterally; No wheeze  HEART: Regular rate and rhythm; No murmurs, rubs, or gallops  ABDOMEN: Soft, Nontender, Nondistended; Bowel sounds present  EXTREMITIES:  right toe swollen   PSYCH: AAOx3  NEUROLOGY: non-focal  SKIN: No rashes or lesions    Labs Reviewed       CBC Full  -  ( 23 Oct 2022 09:11 )  WBC Count : 4.47 K/uL  Hemoglobin : 12.2 g/dL  Hematocrit : 40.6 %  Platelet Count - Automated : 242 K/uL  Mean Cell Volume : 78.5 fL  Mean Cell Hemoglobin : 23.6 pg  Mean Cell Hemoglobin Concentration : 30.0 g/dL  Auto Neutrophil # : x  Auto Lymphocyte # : x  Auto Monocyte # : x  Auto Eosinophil # : x  Auto Basophil # : x  Auto Neutrophil % : x  Auto Lymphocyte % : x  Auto Monocyte % : x  Auto Eosinophil % : x  Auto Basophil % : x    BMP:    10-23 @ 09:11    Blood Urea Nitrogen - 20  Calcium - 9.3  Carbond Dioxide - 25  Chloride - 106  Creatinine - 1.5  Glucose - 129  Potassium - 4.3  Sodium - 142           Urine Culture:  10-20 @ 23:35 Urine culture: --    Culture Results:   No growth to date.  Method Type: --  Organism: --  Organism Identification: --  Specimen Source: .Blood Blood-Peripheral        Imaging reviewed    EKG reviewed    Tele Reviewed    MEDICATIONS  (STANDING):  ceFAZolin   IVPB      ceFAZolin   IVPB 2000 milliGRAM(s) IV Intermittent every 8 hours  enoxaparin Injectable 40 milliGRAM(s) SubCutaneous every 24 hours  metroNIDAZOLE  IVPB 500 milliGRAM(s) IV Intermittent every 8 hours    MEDICATIONS  (PRN):

## 2022-10-23 NOTE — PROGRESS NOTE ADULT - ASSESSMENT
52-year-old male history of diabetes presenting to ED for right toe pain.   Found to have acute septic arthritis/acute osteo of the second proximal interphalangeal joint.    #Septic arthritis/osteomyelitis of the second proximal interphalangeal joint  #Sepsis ruled out admission   #Has hx of OM/ Septic arthritis right second toe s/p debridement and long course of abx   - ESR, CRP wnl   - MRI R  foot Septic arthritis/osteomyelitis of the second proximal interphalangeal joint.  - Podiatry f/u - pending surgical debridement, possible amputation next week   - C/w Ancef and Flagyl   - ID f/u     #CKD 3   - stable,     #Hx of DM   -start insulin basal bolus if finger stick >180     DVT ppx: Lovenox    Pending: -Podiatry f/u - pending surgical debridement, possible amputation next week   Plan of care d/w patient   Dispo: Home

## 2022-10-24 LAB
ALBUMIN SERPL ELPH-MCNC: 3.7 G/DL — SIGNIFICANT CHANGE UP (ref 3.5–5.2)
ALP SERPL-CCNC: 96 U/L — SIGNIFICANT CHANGE UP (ref 30–115)
ALT FLD-CCNC: 8 U/L — SIGNIFICANT CHANGE UP (ref 0–41)
ANION GAP SERPL CALC-SCNC: 7 MMOL/L — SIGNIFICANT CHANGE UP (ref 7–14)
AST SERPL-CCNC: 16 U/L — SIGNIFICANT CHANGE UP (ref 0–41)
BILIRUB SERPL-MCNC: 0.3 MG/DL — SIGNIFICANT CHANGE UP (ref 0.2–1.2)
BUN SERPL-MCNC: 18 MG/DL — SIGNIFICANT CHANGE UP (ref 10–20)
CALCIUM SERPL-MCNC: 9.1 MG/DL — SIGNIFICANT CHANGE UP (ref 8.4–10.4)
CHLORIDE SERPL-SCNC: 106 MMOL/L — SIGNIFICANT CHANGE UP (ref 98–110)
CO2 SERPL-SCNC: 27 MMOL/L — SIGNIFICANT CHANGE UP (ref 17–32)
CREAT SERPL-MCNC: 1.3 MG/DL — SIGNIFICANT CHANGE UP (ref 0.7–1.5)
EGFR: 66 ML/MIN/1.73M2 — SIGNIFICANT CHANGE UP
GLUCOSE BLDC GLUCOMTR-MCNC: 103 MG/DL — HIGH (ref 70–99)
GLUCOSE BLDC GLUCOMTR-MCNC: 104 MG/DL — HIGH (ref 70–99)
GLUCOSE BLDC GLUCOMTR-MCNC: 111 MG/DL — HIGH (ref 70–99)
GLUCOSE BLDC GLUCOMTR-MCNC: 82 MG/DL — SIGNIFICANT CHANGE UP (ref 70–99)
GLUCOSE SERPL-MCNC: 88 MG/DL — SIGNIFICANT CHANGE UP (ref 70–99)
HCT VFR BLD CALC: 35.1 % — LOW (ref 42–52)
HGB BLD-MCNC: 11 G/DL — LOW (ref 14–18)
MCHC RBC-ENTMCNC: 24.1 PG — LOW (ref 27–31)
MCHC RBC-ENTMCNC: 31.3 G/DL — LOW (ref 32–37)
MCV RBC AUTO: 76.8 FL — LOW (ref 80–94)
NRBC # BLD: 0 /100 WBCS — SIGNIFICANT CHANGE UP (ref 0–0)
PLATELET # BLD AUTO: 211 K/UL — SIGNIFICANT CHANGE UP (ref 130–400)
POTASSIUM SERPL-MCNC: 4.4 MMOL/L — SIGNIFICANT CHANGE UP (ref 3.5–5)
POTASSIUM SERPL-SCNC: 4.4 MMOL/L — SIGNIFICANT CHANGE UP (ref 3.5–5)
PROT SERPL-MCNC: 5.7 G/DL — LOW (ref 6–8)
RBC # BLD: 4.57 M/UL — LOW (ref 4.7–6.1)
RBC # FLD: 13.3 % — SIGNIFICANT CHANGE UP (ref 11.5–14.5)
SODIUM SERPL-SCNC: 140 MMOL/L — SIGNIFICANT CHANGE UP (ref 135–146)
WBC # BLD: 3.73 K/UL — LOW (ref 4.8–10.8)
WBC # FLD AUTO: 3.73 K/UL — LOW (ref 4.8–10.8)

## 2022-10-24 PROCEDURE — 93925 LOWER EXTREMITY STUDY: CPT | Mod: 26

## 2022-10-24 PROCEDURE — 99232 SBSQ HOSP IP/OBS MODERATE 35: CPT

## 2022-10-24 RX ORDER — METRONIDAZOLE 500 MG
500 TABLET ORAL EVERY 8 HOURS
Refills: 0 | Status: DISCONTINUED | OUTPATIENT
Start: 2022-10-24 | End: 2022-10-25

## 2022-10-24 RX ADMIN — Medication 100 MILLIGRAM(S): at 05:11

## 2022-10-24 RX ADMIN — Medication 100 MILLIGRAM(S): at 16:17

## 2022-10-24 RX ADMIN — Medication 500 MILLIGRAM(S): at 21:53

## 2022-10-24 RX ADMIN — Medication 100 MILLIGRAM(S): at 21:53

## 2022-10-24 NOTE — PROGRESS NOTE ADULT - SUBJECTIVE AND OBJECTIVE BOX
Pt seen and examined at bedside.         VITAL SIGNS (Last 24 hrs):  T(C): 36.7 (10-24-22 @ 05:32), Max: 36.7 (10-24-22 @ 05:32)  HR: 77 (10-24-22 @ 05:32) (75 - 77)  BP: 142/78 (10-24-22 @ 05:32) (142/78 - 147/76)  RR: 18 (10-24-22 @ 05:32) (18 - 18)       I&O's Summary      PHYSICAL EXAM:  GENERAL: NAD   HEAD:  Atraumatic, Normocephalic  EYES: conjunctiva and sclera clear  NECK: Supple, No JVD  CHEST/LUNG: Clear to auscultation bilaterally; No wheeze  HEART: Regular rate and rhythm; No murmurs, rubs, or gallops  ABDOMEN: Soft, Nontender, Nondistended; Bowel sounds present  EXTREMITIES:  2+ Peripheral Pulses, No clubbing, cyanosis, or edema  PSYCH: AAOx3  NEUROLOGY: non-focal  SKIN: No rashes or lesions    Labs Reviewed       CBC Full  -  ( 24 Oct 2022 06:29 )  WBC Count : 3.73 K/uL  Hemoglobin : 11.0 g/dL  Hematocrit : 35.1 %  Platelet Count - Automated : 211 K/uL  Mean Cell Volume : 76.8 fL  Mean Cell Hemoglobin : 24.1 pg  Mean Cell Hemoglobin Concentration : 31.3 g/dL  Auto Neutrophil # : x  Auto Lymphocyte # : x  Auto Monocyte # : x  Auto Eosinophil # : x  Auto Basophil # : x  Auto Neutrophil % : x  Auto Lymphocyte % : x  Auto Monocyte % : x  Auto Eosinophil % : x  Auto Basophil % : x    BMP:    10-24 @ 06:29    Blood Urea Nitrogen - 18  Calcium - 9.1  Carbon  Dioxide - 27  Chloride - 106  Creatinine - 1.3  Glucose - 88  Potassium - 4.4  Sodium - 140         Urine Culture:  10-20 @ 23:35 Urine culture: --    Culture Results:   No growth to date.  Method Type: --  Organism: --  Organism Identification: --  Specimen Source: .Blood Blood-Peripheral           MEDICATIONS  (STANDING):  ceFAZolin   IVPB      ceFAZolin   IVPB 2000 milliGRAM(s) IV Intermittent every 8 hours  enoxaparin Injectable 40 milliGRAM(s) SubCutaneous every 24 hours  metroNIDAZOLE  IVPB 500 milliGRAM(s) IV Intermittent every 8 hours    MEDICATIONS  (PRN):

## 2022-10-24 NOTE — PROGRESS NOTE ADULT - SUBJECTIVE AND OBJECTIVE BOX
MAUREEN CASTAÑEDA 52y Male  MRN#: [id number]   Hospital Day: 3d    HPI:  52-year-old male history of diabetes,  OM/ Septic arthritis right second toe s/p debridement  presenting to ED for evaluation and IV antibiotic. Patient was seen yesterday for right foot numbness and discomfort. He reported  right second toe increased deformity.  Patient states he's been going to the gym for last 2 months, denies any trauma or pain.  He was called back to ED today after x-ray revealed questionable osteomyelitis/septic arthritis. Patient denies any fevers or chills or other symptoms,    In the ED: /82, HR 84, RR 18, T 97.8, spo2 99% on RA. WBC 4.13, hgb 11.5, cr 1.6. Xray right foot showed Destruction of the second proximal interphalangeal joint which could reflect septic arthritis. Fragmentation the medial sesamoid. Questionable chronic changes. Acute fracture cannot be excluded. Dystrophic calcifications in the region of the plantar fascia.       (21 Oct 2022 04:18)      SUBJECTIVE  Patient is a 52y old Male who presents with a chief complaint of Osteomyelitis (24 Oct 2022 13:13)  Currently admitted to medicine with the primary diagnosis of Acute osteomyelitis      INTERVAL HPI AND OVERNIGHT EVENTS:  Patient was examined and seen at bedside. This morning he is resting comfortably in bed and reports no issues or overnight events.      OBJECTIVE  PAST MEDICAL & SURGICAL HISTORY  Diabetes    Hypertension    History of penile disorder    Gallstones    History of eye surgery      ALLERGIES:  No Known Allergies    MEDICATIONS:  STANDING MEDICATIONS  ceFAZolin   IVPB      ceFAZolin   IVPB 2000 milliGRAM(s) IV Intermittent every 8 hours  enoxaparin Injectable 40 milliGRAM(s) SubCutaneous every 24 hours  metroNIDAZOLE  IVPB 500 milliGRAM(s) IV Intermittent every 8 hours    PRN MEDICATIONS      VITAL SIGNS: Last 24 Hours  T(C): 36.7 (24 Oct 2022 05:32), Max: 36.7 (24 Oct 2022 05:32)  T(F): 98 (24 Oct 2022 05:32), Max: 98 (24 Oct 2022 05:32)  HR: 77 (24 Oct 2022 05:32) (75 - 77)  BP: 142/78 (24 Oct 2022 05:32) (142/78 - 147/76)  BP(mean): --  RR: 18 (24 Oct 2022 05:32) (18 - 18)  SpO2: --    LABS:                        11.0   3.73  )-----------( 211      ( 24 Oct 2022 06:29 )             35.1     10-24    140  |  106  |  18  ----------------------------<  88  4.4   |  27  |  1.3    Ca    9.1      24 Oct 2022 06:29    TPro  5.7<L>  /  Alb  3.7  /  TBili  0.3  /  DBili  x   /  AST  16  /  ALT  8   /  AlkPhos  96  10-24        PHYSICAL EXAM:  CONSTITUTIONAL: No acute distress, well-developed, well-groomed, AAOx3  HEAD: Atraumatic, normocephalic  EYES: EOM intact, PERRLA, conjunctiva and sclera clear  ENT: Supple, no masses, no thyromegaly, no bruits, no JVD; moist mucous membranes  PULMONARY: Clear to auscultation bilaterally; no wheezes, rales, or rhonchi  CARDIOVASCULAR: Regular rate and rhythm; no murmurs, rubs, or gallops  GASTROINTESTINAL: Soft, non-tender, non-distended; bowel sounds present  MUSCULOSKELETAL: 2+ peripheral pulses; no clubbing, no cyanosis, no edema  NEUROLOGY: non-focal  SKIN: No rashes or lesions; warm and dry    ASSESSMENT & PLAN  52-year-old male history of diabetes presenting to ED for right toe pain.   Found to have acute septic arthritis/acute osteo of the second proximal interphalangeal joint.    #Septic arthritis/osteomyelitis of the second proximal interphalangeal joint  #Sepsis ruled out admission   #Has hx of OM/ Septic arthritis right second toe s/p debridement and long course of abx   - ESR, CRP wnl   - MRI R  foot Septic arthritis/osteomyelitis of the second proximal interphalangeal joint.  - Podiatry f/u - pending surgical debridement, possible amputation tomorrow  - C/w Ancef and Flagyl   - ID f/u --> will give final recs pot OR   - f/u pre-op labs, NPO after midnight   - f/u arterial duplex     #CKD 3   - stable,     #Hx of DM   -start insulin basal bolus if finger stick >180     DVT ppx: Lovenox    Pending: OR tomorrow   Plan of care d/w patient   Dispo: Home

## 2022-10-24 NOTE — PROGRESS NOTE ADULT - SUBJECTIVE AND OBJECTIVE BOX
MAUREEN CASTAÑEDA  52y, Male  Allergy: No Known Allergies      LOS  3d    CHIEF COMPLAINT: Osteomyelitis (24 Oct 2022 13:20)      INTERVAL EVENTS/HPI  - No acute events overnight  - T(F): , Max: 98 (10-24-22 @ 05:32)  - Denies any worsening symptoms  - Tolerating medication  - WBC Count: 3.73 (10-24-22 @ 06:29)  WBC Count: 4.47 (10-23-22 @ 09:11)     - Creatinine, Serum: 1.3 (10-24-22 @ 06:29)  Creatinine, Serum: 1.5 (10-23-22 @ 09:11)       ROS  General: Denies rigors, nightsweats  HEENT: Denies headache, rhinorrhea, sore throat, eye pain  CV: Denies CP, palpitations  PULM: Denies wheezing, hemoptysis  GI: Denies hematemesis, hematochezia, melena  : Denies discharge, hematuria  MSK: Denies arthralgias, myalgias  SKIN: Denies rash, lesions  NEURO: Denies paresthesias, weakness  PSYCH: Denies depression, anxiety    VITALS:  T(F): 97, Max: 98 (10-24-22 @ 05:32)  HR: 76  BP: 150/69  RR: 20Vital Signs Last 24 Hrs  T(C): 36.1 (24 Oct 2022 14:54), Max: 36.7 (24 Oct 2022 05:32)  T(F): 97 (24 Oct 2022 14:54), Max: 98 (24 Oct 2022 05:32)  HR: 76 (24 Oct 2022 14:54) (75 - 77)  BP: 150/69 (24 Oct 2022 14:54) (142/78 - 150/69)  BP(mean): --  RR: 20 (24 Oct 2022 14:54) (18 - 20)  SpO2: --        PHYSICAL EXAM:  Gen: NAD, resting in bed  HEENT: Normocephalic, atraumatic  Neck: supple, no lymphadenopathy  CV: Regular rate & regular rhythm  Lungs: decreased BS at bases, no fremitus  Abdomen: Soft, BS present  Ext: Warm, well perfused R 2nd sausage digit  Neuro: non focal, awake  Skin: no rash, no erythema  Lines: no phlebitis    FH: Non-contributory  Social Hx: Non-contributory    TESTS & MEASUREMENTS:                        11.0   3.73  )-----------( 211      ( 24 Oct 2022 06:29 )             35.1     10-24    140  |  106  |  18  ----------------------------<  88  4.4   |  27  |  1.3    Ca    9.1      24 Oct 2022 06:29    TPro  5.7<L>  /  Alb  3.7  /  TBili  0.3  /  DBili  x   /  AST  16  /  ALT  8   /  AlkPhos  96  10-24      LIVER FUNCTIONS - ( 24 Oct 2022 06:29 )  Alb: 3.7 g/dL / Pro: 5.7 g/dL / ALK PHOS: 96 U/L / ALT: 8 U/L / AST: 16 U/L / GGT: x               Culture - Blood (collected 10-20-22 @ 23:35)  Source: .Blood Blood-Peripheral  Preliminary Report (10-22-22 @ 07:01):    No growth to date.    Culture - Blood (collected 10-20-22 @ 23:35)  Source: .Blood Blood-Peripheral  Preliminary Report (10-22-22 @ 07:01):    No growth to date.            INFECTIOUS DISEASES TESTING  COVID-19 PCR: Detected (06-06-22 @ 10:20)      INFLAMMATORY MARKERS  Sedimentation Rate, Erythrocyte: 7 mm/Hr (10-21-22 @ 10:50)  C-Reactive Protein, Serum: 3.0 mg/L (10-21-22 @ 10:50)  Sedimentation Rate, Erythrocyte: 3 mm/Hr (10-21-22 @ 00:05)  C-Reactive Protein, Serum: 3.0 mg/L (10-21-22 @ 00:05)      RADIOLOGY & ADDITIONAL TESTS:  I have personally reviewed the last available Chest xray  CXR      CT      CARDIOLOGY TESTING      MEDICATIONS  ceFAZolin   IVPB     ceFAZolin   IVPB 2000 IV Intermittent every 8 hours  enoxaparin Injectable 40 SubCutaneous every 24 hours  metroNIDAZOLE  IVPB 500 IV Intermittent every 8 hours      WEIGHT  Weight (kg): 111.1 (06-06-22 @ 09:52)  Creatinine, Serum: 1.3 mg/dL (10-24-22 @ 06:29)      ANTIBIOTICS:  ceFAZolin   IVPB      ceFAZolin   IVPB 2000 milliGRAM(s) IV Intermittent every 8 hours  metroNIDAZOLE  IVPB 500 milliGRAM(s) IV Intermittent every 8 hours      All available historical records have been reviewed

## 2022-10-24 NOTE — PROGRESS NOTE ADULT - ASSESSMENT
52-year-old male history of diabetes presenting to ED for right toe pain.   Found to have acute septic arthritis/acute osteo of the second proximal interphalangeal joint.    #Septic arthritis/osteomyelitis of the second proximal interphalangeal joint  #Sepsis ruled out admission   #Has hx of OM/ Septic arthritis right second toe s/p debridement and long course of abx   - ESR, CRP wnl   - MRI R  foot Septic arthritis/osteomyelitis of the second proximal interphalangeal joint.  - Podiatry f/u - scheduled for excisional debridement of soft tissue/bone with possible partial amputation of 2nd digit right foot tomorrow, Tues 10/25  - Patient is medically optimized for planned procedure   - C/w Ancef and Flagyl   - ID f/u     #CKD 3   - stable    #Hx of DM   -start insulin basal bolus if finger stick >180     DVT ppx: Lovenox      Pending: scheduled for excisional debridement of soft tissue/bone with possible partial amputation of 2nd digit right foot tomorrow, Tues 10/25  Plan of care d/w patient   Dispo: Home

## 2022-10-24 NOTE — PROGRESS NOTE ADULT - ASSESSMENT
ASSESSMENT  52-year-old male history of diabetes,  OM/ Septic arthritis right second toe s/p debridement  presenting to ED for evaluation and IV antibiotic. Patient was seen yesterday for right foot numbness and discomfort. He reported  right second toe increased deformity.  Patient states he's been going to the gym for last 2 months, denies any trauma or pain.  He was called back to ED today after x-ray revealed questionable osteomyelitis/septic arthritis. Patient denies any fevers or chills or other symptoms,    In the ED: /82, HR 84, RR 18, T 97.8, spo2 99% on RA. WBC 4.13, hgb 11.5, cr 1.6. Xray right foot showed Destruction of the second proximal interphalangeal joint which could reflect septic arthritis. Fragmentation the medial sesamoid. Questionable chronic changes. Acute fracture cannot be excluded. Dystrophic calcifications in the region of the plantar fascia.    IMPRESSION  #Septic arthritis/osteomyelitis right 2nd toe in pt with DM, S/P surgery. No prior path report nor cultures on Oriental  Possible chronic as unremarkable inflammatory markers  Sedimentation Rate, Erythrocyte: 7 mm/Hr (10-21-22 @ 10:50)  C-Reactive Protein, Serum: 3.0 mg/L (10-21-22 @ 10:50)  < from: MR Foot w/wo IV Cont, Right (10.21.22 @ 12:30) >  Septic arthritis/osteomyelitis of the second proximal interphalangeal joint.  Osteoarthritis of the forefoot joints.  2 cm adventitial bursitis plantar to the first MTP.  Right foot xray with destruction at the level of the second proximal interphalangeal joint. Questionable osteomyelitis. Erosive change is seen in the region of the distal proximal fifth phalanx.   ESR 3, CRP 3    On ceFAZolin   IVPB 2000 milliGRAM(s) IV Intermittent every 8 hours    Cr 1.6      RECOMMEND  - Can change flagyl to PO 500mg TID  - Continue Ancef 2g q8h IV   - MRSA nares  - Await podiatry possible OR, if declined OR_ then PICC x 6 weeks IV ABX     If any questions, please call or send a message on [employer]  Please continue to update ID with any pertinent new laboratory or radiographic findings  Spectra [id number]

## 2022-10-24 NOTE — CHART NOTE - NSCHARTNOTEFT_GEN_A_CORE
Patient is scheduled for excisional debridement of soft tissue/bone with possible partial amputation of 2nd digit right foot tomorrow, Tues 10/25    Patient will be informed by podiatry of surgical plan    Please order arterial duplex bilateral lower extremity to be done today, 10/24    Please make pt NPO MN 10/24  Request: Medical Clearance prior to OR  Please optimize lab values prior to OR  Request T&S and Coag studies prior to OR    Podiatry   [phone]

## 2022-10-25 ENCOUNTER — TRANSCRIPTION ENCOUNTER (OUTPATIENT)
Age: 52
End: 2022-10-25

## 2022-10-25 ENCOUNTER — RESULT REVIEW (OUTPATIENT)
Age: 52
End: 2022-10-25

## 2022-10-25 LAB
ALBUMIN SERPL ELPH-MCNC: 4.5 G/DL — SIGNIFICANT CHANGE UP (ref 3.5–5.2)
ALP SERPL-CCNC: 109 U/L — SIGNIFICANT CHANGE UP (ref 30–115)
ALT FLD-CCNC: 14 U/L — SIGNIFICANT CHANGE UP (ref 0–41)
ANION GAP SERPL CALC-SCNC: 9 MMOL/L — SIGNIFICANT CHANGE UP (ref 7–14)
APTT BLD: 35.1 SEC — SIGNIFICANT CHANGE UP (ref 27–39.2)
AST SERPL-CCNC: 30 U/L — SIGNIFICANT CHANGE UP (ref 0–41)
BASOPHILS # BLD AUTO: 0.03 K/UL — SIGNIFICANT CHANGE UP (ref 0–0.2)
BASOPHILS NFR BLD AUTO: 0.7 % — SIGNIFICANT CHANGE UP (ref 0–1)
BILIRUB SERPL-MCNC: 0.2 MG/DL — SIGNIFICANT CHANGE UP (ref 0.2–1.2)
BLD GP AB SCN SERPL QL: SIGNIFICANT CHANGE UP
BUN SERPL-MCNC: 19 MG/DL — SIGNIFICANT CHANGE UP (ref 10–20)
CALCIUM SERPL-MCNC: 9.3 MG/DL — SIGNIFICANT CHANGE UP (ref 8.4–10.5)
CHLORIDE SERPL-SCNC: 106 MMOL/L — SIGNIFICANT CHANGE UP (ref 98–110)
CO2 SERPL-SCNC: 27 MMOL/L — SIGNIFICANT CHANGE UP (ref 17–32)
CREAT SERPL-MCNC: 1.1 MG/DL — SIGNIFICANT CHANGE UP (ref 0.7–1.5)
EGFR: 80 ML/MIN/1.73M2 — SIGNIFICANT CHANGE UP
EOSINOPHIL # BLD AUTO: 0.21 K/UL — SIGNIFICANT CHANGE UP (ref 0–0.7)
EOSINOPHIL NFR BLD AUTO: 5.1 % — SIGNIFICANT CHANGE UP (ref 0–8)
GLUCOSE BLDC GLUCOMTR-MCNC: 107 MG/DL — HIGH (ref 70–99)
GLUCOSE BLDC GLUCOMTR-MCNC: 119 MG/DL — HIGH (ref 70–99)
GLUCOSE BLDC GLUCOMTR-MCNC: 93 MG/DL — SIGNIFICANT CHANGE UP (ref 70–99)
GLUCOSE BLDC GLUCOMTR-MCNC: 94 MG/DL — SIGNIFICANT CHANGE UP (ref 70–99)
GLUCOSE BLDC GLUCOMTR-MCNC: 97 MG/DL — SIGNIFICANT CHANGE UP (ref 70–99)
GLUCOSE BLDC GLUCOMTR-MCNC: 99 MG/DL — SIGNIFICANT CHANGE UP (ref 70–99)
GLUCOSE SERPL-MCNC: 102 MG/DL — HIGH (ref 70–99)
HCT VFR BLD CALC: 40.4 % — LOW (ref 42–52)
HGB BLD-MCNC: 12.3 G/DL — LOW (ref 14–18)
IMM GRANULOCYTES NFR BLD AUTO: 0.2 % — SIGNIFICANT CHANGE UP (ref 0.1–0.3)
INR BLD: 1.04 RATIO — SIGNIFICANT CHANGE UP (ref 0.65–1.3)
LYMPHOCYTES # BLD AUTO: 1.33 K/UL — SIGNIFICANT CHANGE UP (ref 1.2–3.4)
LYMPHOCYTES # BLD AUTO: 32.6 % — SIGNIFICANT CHANGE UP (ref 20.5–51.1)
MCHC RBC-ENTMCNC: 23.7 PG — LOW (ref 27–31)
MCHC RBC-ENTMCNC: 30.4 G/DL — LOW (ref 32–37)
MCV RBC AUTO: 78 FL — LOW (ref 80–94)
MONOCYTES # BLD AUTO: 0.4 K/UL — SIGNIFICANT CHANGE UP (ref 0.1–0.6)
MONOCYTES NFR BLD AUTO: 9.8 % — HIGH (ref 1.7–9.3)
NEUTROPHILS # BLD AUTO: 2.1 K/UL — SIGNIFICANT CHANGE UP (ref 1.4–6.5)
NEUTROPHILS NFR BLD AUTO: 51.6 % — SIGNIFICANT CHANGE UP (ref 42.2–75.2)
NRBC # BLD: 0 /100 WBCS — SIGNIFICANT CHANGE UP (ref 0–0)
PLATELET # BLD AUTO: 231 K/UL — SIGNIFICANT CHANGE UP (ref 130–400)
POTASSIUM SERPL-MCNC: 4.4 MMOL/L — SIGNIFICANT CHANGE UP (ref 3.5–5)
POTASSIUM SERPL-SCNC: 4.4 MMOL/L — SIGNIFICANT CHANGE UP (ref 3.5–5)
PROT SERPL-MCNC: 6.6 G/DL — SIGNIFICANT CHANGE UP (ref 6–8)
PROTHROM AB SERPL-ACNC: 11.9 SEC — SIGNIFICANT CHANGE UP (ref 9.95–12.87)
RBC # BLD: 5.18 M/UL — SIGNIFICANT CHANGE UP (ref 4.7–6.1)
RBC # FLD: 13.3 % — SIGNIFICANT CHANGE UP (ref 11.5–14.5)
SODIUM SERPL-SCNC: 142 MMOL/L — SIGNIFICANT CHANGE UP (ref 135–146)
WBC # BLD: 4.08 K/UL — LOW (ref 4.8–10.8)
WBC # FLD AUTO: 4.08 K/UL — LOW (ref 4.8–10.8)

## 2022-10-25 PROCEDURE — 73630 X-RAY EXAM OF FOOT: CPT | Mod: 26,RT

## 2022-10-25 PROCEDURE — 99232 SBSQ HOSP IP/OBS MODERATE 35: CPT

## 2022-10-25 PROCEDURE — 93010 ELECTROCARDIOGRAM REPORT: CPT

## 2022-10-25 PROCEDURE — 88304 TISSUE EXAM BY PATHOLOGIST: CPT | Mod: 26

## 2022-10-25 PROCEDURE — 88311 DECALCIFY TISSUE: CPT | Mod: 26

## 2022-10-25 PROCEDURE — 28124 PARTIAL REMOVAL OF TOE: CPT | Mod: RT,T6

## 2022-10-25 RX ORDER — ENOXAPARIN SODIUM 100 MG/ML
40 INJECTION SUBCUTANEOUS EVERY 24 HOURS
Refills: 0 | Status: DISCONTINUED | OUTPATIENT
Start: 2022-10-25 | End: 2022-10-27

## 2022-10-25 RX ORDER — ONDANSETRON 8 MG/1
4 TABLET, FILM COATED ORAL ONCE
Refills: 0 | Status: DISCONTINUED | OUTPATIENT
Start: 2022-10-25 | End: 2022-10-25

## 2022-10-25 RX ORDER — LOSARTAN POTASSIUM 100 MG/1
50 TABLET, FILM COATED ORAL DAILY
Refills: 0 | Status: DISCONTINUED | OUTPATIENT
Start: 2022-10-25 | End: 2022-10-25

## 2022-10-25 RX ORDER — METRONIDAZOLE 500 MG
500 TABLET ORAL EVERY 8 HOURS
Refills: 0 | Status: DISCONTINUED | OUTPATIENT
Start: 2022-10-25 | End: 2022-10-27

## 2022-10-25 RX ORDER — CEFAZOLIN SODIUM 1 G
2000 VIAL (EA) INJECTION EVERY 8 HOURS
Refills: 0 | Status: DISCONTINUED | OUTPATIENT
Start: 2022-10-25 | End: 2022-10-27

## 2022-10-25 RX ORDER — MORPHINE SULFATE 50 MG/1
2 CAPSULE, EXTENDED RELEASE ORAL
Refills: 0 | Status: DISCONTINUED | OUTPATIENT
Start: 2022-10-25 | End: 2022-10-25

## 2022-10-25 RX ORDER — SODIUM CHLORIDE 9 MG/ML
1000 INJECTION, SOLUTION INTRAVENOUS
Refills: 0 | Status: DISCONTINUED | OUTPATIENT
Start: 2022-10-25 | End: 2022-10-25

## 2022-10-25 RX ORDER — LOSARTAN POTASSIUM 100 MG/1
50 TABLET, FILM COATED ORAL DAILY
Refills: 0 | Status: DISCONTINUED | OUTPATIENT
Start: 2022-10-25 | End: 2022-10-27

## 2022-10-25 RX ADMIN — Medication 100 MILLIGRAM(S): at 17:05

## 2022-10-25 RX ADMIN — ENOXAPARIN SODIUM 40 MILLIGRAM(S): 100 INJECTION SUBCUTANEOUS at 16:46

## 2022-10-25 RX ADMIN — Medication 500 MILLIGRAM(S): at 16:46

## 2022-10-25 RX ADMIN — Medication 100 MILLIGRAM(S): at 05:53

## 2022-10-25 RX ADMIN — Medication 500 MILLIGRAM(S): at 05:52

## 2022-10-25 RX ADMIN — Medication 100 MILLIGRAM(S): at 23:48

## 2022-10-25 RX ADMIN — Medication 500 MILLIGRAM(S): at 23:49

## 2022-10-25 NOTE — PROGRESS NOTE ADULT - ATTENDING COMMENTS
52-year-old male history of diabetes presenting to ED for right toe pain.   Found to have acute septic arthritis/acute osteo of the second proximal interphalangeal joint.    #Septic arthritis/osteomyelitis of the second proximal interphalangeal joint  #Sepsis ruled out admission   #Has hx of OM/ Septic arthritis right second toe s/p debridement and long course of abx   - ESR, CRP wnl   - MRI R  foot Septic arthritis/osteomyelitis of the second proximal interphalangeal joint.  - Podiatry f/u - scheduled for excisional debridement of soft tissue/bone with possible partial amputation of 2nd digit right foot today  - Patient is medically optimized for planned procedure   - C/w Ancef and Flagyl   - ID f/u     #CKD 3   - stable    #Hx of DM   -start insulin basal bolus if finger stick >180     DVT ppx: Lovenox      Pending: scheduled for excisional debridement of soft tissue/bone with possible partial amputation of 2nd digit right foot today  Plan of care d/w patient   Dispo: Home

## 2022-10-25 NOTE — BRIEF OPERATIVE NOTE - NSICDXBRIEFPREOP_GEN_ALL_CORE_FT
PRE-OP DIAGNOSIS:  Acute osteomyelitis 25-Oct-2022 13:43:52 Right 2nd toe IPJ osteomyelitis and septic arthritis Roxy Valencia

## 2022-10-25 NOTE — PRE-OP CHECKLIST - PATIENT'S PERSONAL PROPERTY REMOVED
none brought to pre-op area as per patient undershirt and socks removed in pre-op area, placed in belongings bag

## 2022-10-25 NOTE — BRIEF OPERATIVE NOTE - SPECIMENS
Surgical bone culture proximal margin; bone culture of debrided bone; pathology culture of right 2nd toe

## 2022-10-25 NOTE — PROGRESS NOTE ADULT - SUBJECTIVE AND OBJECTIVE BOX
MAUREEN CASTAÑEDA 52y Male  MRN#: [id number]   Hospital Day: 4d    HPI:  52-year-old male history of diabetes,  OM/ Septic arthritis right second toe s/p debridement  presenting to ED for evaluation and IV antibiotic. Patient was seen yesterday for right foot numbness and discomfort. He reported  right second toe increased deformity.  Patient states he's been going to the gym for last 2 months, denies any trauma or pain.  He was called back to ED today after x-ray revealed questionable osteomyelitis/septic arthritis. Patient denies any fevers or chills or other symptoms,    In the ED: /82, HR 84, RR 18, T 97.8, spo2 99% on RA. WBC 4.13, hgb 11.5, cr 1.6. Xray right foot showed Destruction of the second proximal interphalangeal joint which could reflect septic arthritis. Fragmentation the medial sesamoid. Questionable chronic changes. Acute fracture cannot be excluded. Dystrophic calcifications in the region of the plantar fascia.       (21 Oct 2022 04:18)      SUBJECTIVE  Patient is a 52y old Male who presents with a chief complaint of Osteomyelitis (24 Oct 2022 14:56)  Currently admitted to medicine with the primary diagnosis of Acute osteomyelitis      INTERVAL HPI AND OVERNIGHT EVENTS:  Patient was examined and seen at bedside. This morning he is resting comfortably in bed and reports no issues or overnight events.      OBJECTIVE  PAST MEDICAL & SURGICAL HISTORY  Diabetes    Hypertension    History of penile disorder    Gallstones    History of eye surgery      ALLERGIES:  No Known Allergies    MEDICATIONS:  STANDING MEDICATIONS  ceFAZolin   IVPB      ceFAZolin   IVPB 2000 milliGRAM(s) IV Intermittent every 8 hours  enoxaparin Injectable 40 milliGRAM(s) SubCutaneous every 24 hours  metroNIDAZOLE    Tablet 500 milliGRAM(s) Oral every 8 hours    PRN MEDICATIONS      VITAL SIGNS: Last 24 Hours  T(C): 36.1 (25 Oct 2022 04:24), Max: 36.4 (24 Oct 2022 19:42)  T(F): 97 (25 Oct 2022 04:24), Max: 97.5 (24 Oct 2022 19:42)  HR: 75 (25 Oct 2022 05:22) (73 - 76)  BP: 168/79 (25 Oct 2022 05:22) (150/69 - 175/86)  BP(mean): --  RR: 18 (25 Oct 2022 04:24) (18 - 20)  SpO2: 100% (25 Oct 2022 04:24) (100% - 100%)    LABS:                        11.0   3.73  )-----------( 211      ( 24 Oct 2022 06:29 )             35.1     10-24    140  |  106  |  18  ----------------------------<  88  4.4   |  27  |  1.3    Ca    9.1      24 Oct 2022 06:29    TPro  5.7<L>  /  Alb  3.7  /  TBili  0.3  /  DBili  x   /  AST  16  /  ALT  8   /  AlkPhos  96  10-24    PHYSICAL EXAM:  CONSTITUTIONAL: No acute distress, well-developed, well-groomed, AAOx3  HEAD: Atraumatic, normocephalic  EYES: EOM intact, PERRLA, conjunctiva and sclera clear  ENT: Supple, no masses, no thyromegaly, no bruits, no JVD; moist mucous membranes  PULMONARY: Clear to auscultation bilaterally; no wheezes, rales, or rhonchi  CARDIOVASCULAR: Regular rate and rhythm; no murmurs, rubs, or gallops  GASTROINTESTINAL: Soft, non-tender, non-distended; bowel sounds present  MUSCULOSKELETAL: 2+ peripheral pulses; no clubbing, no cyanosis, no edema  NEUROLOGY: non-focal  SKIN: No rashes or lesions; warm and dry    ASSESSMENT & PLAN   MAUREEN CASTAÑEDA 52y Male  MRN#: [id number]   Hospital Day: 4d    HPI:  52-year-old male history of diabetes,  OM/ Septic arthritis right second toe s/p debridement  presenting to ED for evaluation and IV antibiotic. Patient was seen yesterday for right foot numbness and discomfort. He reported  right second toe increased deformity.  Patient states he's been going to the gym for last 2 months, denies any trauma or pain.  He was called back to ED today after x-ray revealed questionable osteomyelitis/septic arthritis. Patient denies any fevers or chills or other symptoms,    In the ED: /82, HR 84, RR 18, T 97.8, spo2 99% on RA. WBC 4.13, hgb 11.5, cr 1.6. Xray right foot showed Destruction of the second proximal interphalangeal joint which could reflect septic arthritis. Fragmentation the medial sesamoid. Questionable chronic changes. Acute fracture cannot be excluded. Dystrophic calcifications in the region of the plantar fascia.       (21 Oct 2022 04:18)      SUBJECTIVE  Patient is a 52y old Male who presents with a chief complaint of Osteomyelitis (24 Oct 2022 14:56)  Currently admitted to medicine with the primary diagnosis of Acute osteomyelitis      INTERVAL HPI AND OVERNIGHT EVENTS:  Patient was examined and seen at bedside. This morning he is resting comfortably in bed and reports no issues or overnight events. He was anxious and hesitant about the surgery today. I answered all his questions and concerns       OBJECTIVE  PAST MEDICAL & SURGICAL HISTORY  Diabetes    Hypertension    History of penile disorder    Gallstones    History of eye surgery      ALLERGIES:  No Known Allergies    MEDICATIONS:  STANDING MEDICATIONS  ceFAZolin   IVPB      ceFAZolin   IVPB 2000 milliGRAM(s) IV Intermittent every 8 hours  enoxaparin Injectable 40 milliGRAM(s) SubCutaneous every 24 hours  metroNIDAZOLE    Tablet 500 milliGRAM(s) Oral every 8 hours    PRN MEDICATIONS      VITAL SIGNS: Last 24 Hours  T(C): 36.1 (25 Oct 2022 04:24), Max: 36.4 (24 Oct 2022 19:42)  T(F): 97 (25 Oct 2022 04:24), Max: 97.5 (24 Oct 2022 19:42)  HR: 75 (25 Oct 2022 05:22) (73 - 76)  BP: 168/79 (25 Oct 2022 05:22) (150/69 - 175/86)  BP(mean): --  RR: 18 (25 Oct 2022 04:24) (18 - 20)  SpO2: 100% (25 Oct 2022 04:24) (100% - 100%)    LABS:                        11.0   3.73  )-----------( 211      ( 24 Oct 2022 06:29 )             35.1     10-24    140  |  106  |  18  ----------------------------<  88  4.4   |  27  |  1.3    Ca    9.1      24 Oct 2022 06:29    TPro  5.7<L>  /  Alb  3.7  /  TBili  0.3  /  DBili  x   /  AST  16  /  ALT  8   /  AlkPhos  96  10-24    PHYSICAL EXAM:  CONSTITUTIONAL: No acute distress, well-developed, well-groomed, AAOx3  HEAD: Atraumatic, normocephalic  EYES: EOM intact, PERRLA, conjunctiva and sclera clear  ENT: Supple, no masses, no thyromegaly, no bruits, no JVD; moist mucous membranes  PULMONARY: Clear to auscultation bilaterally; no wheezes, rales, or rhonchi  CARDIOVASCULAR: Regular rate and rhythm; no murmurs, rubs, or gallops  GASTROINTESTINAL: Soft, non-tender, non-distended; bowel sounds present  MUSCULOSKELETAL: 2+ peripheral pulses; no clubbing, no cyanosis, no edema  NEUROLOGY: non-focal  SKIN: No rashes or lesions; warm and dry    ASSESSMENT & PLAN  52-year-old male history of diabetes presenting to ED for right toe pain.   Found to have acute septic arthritis/acute osteo of the second proximal interphalangeal joint.    #Septic arthritis/osteomyelitis of the second proximal interphalangeal joint  #Sepsis ruled out admission   #Has hx of OM/ Septic arthritis right second toe s/p debridement and long course of abx   - ESR, CRP wnl   - MRI R  foot Septic arthritis/osteomyelitis of the second proximal interphalangeal joint.  - Podiatry f/u - pending surgical debridement, possible amputation today   - C/w Ancef and Flagyl   - ID f/u --> will give final recs pot OR   - arterial duplex normal   - OR today     #CKD 3   - stable,     #Hx of DM   -start insulin basal bolus if finger stick >180     DVT ppx: Lovenox    Pending: OR tomorrow   Plan of care d/w patient   Dispo: Home

## 2022-10-25 NOTE — CHART NOTE - NSCHARTNOTEFT_GEN_A_CORE
PACU ANESTHESIA ADMISSION NOTE      Procedure: Partial excision of bone of foot  Right 2nd toe IPJ osteomyelitis and septic arthritis      Post op diagnosis:  Acute osteomyelitis        ____  Intubated  TV:______       Rate: ______      FiO2: ______    _X___  Patent Airway    __X__  Full return of protective reflexes    ____  Full recovery from anesthesia / back to baseline status    Vitals:  T: 97.7F  HR: 79  BP: 156/65  RR: 16  SpO2: 100%    Mental Status:  _X___ Awake   _____ Alert   _____ Drowsy   _____ Sedated    Nausea/Vomiting:  __X__ NO  ______Yes,   See Post - Op Orders          Pain Scale (0-10):  _____    Treatment: ____ None    ___X_ See Post - Op/PCA Orders    Post - Operative Fluids:   ____ Oral   _X___ See Post - Op Orders    Plan: Discharge:   ____Home       __X___Floor     _____Critical Care    _____  Other:_________________     Comments: NO anesthetic related complications noted. pt. transported to PACU, report endorsed to Rn

## 2022-10-25 NOTE — BRIEF OPERATIVE NOTE - NSICDXBRIEFPOSTOP_GEN_ALL_CORE_FT
POST-OP DIAGNOSIS:  Acute osteomyelitis 25-Oct-2022 13:44:08 Right 2nd toe IPJ osteomyelitis and septic arthritis Roxy Valencia

## 2022-10-25 NOTE — PRE-OP CHECKLIST - 1.
EKG performed in pre-op area as per anesthesia; reviewed by CRNA. Patient to proceed with surgery after at length discussion with patient, Dr. Fisher & anesthesia. EKG performed in pre-op area as per anesthesia; reviewed by CRNA. Patient received in pre-op area anxious, hypertensive with SBP>200. MINE Betts at bedside & aware. Patient to proceed with surgery after at length discussion with patient, Dr. Fisher & anesthesia.

## 2022-10-26 ENCOUNTER — TRANSCRIPTION ENCOUNTER (OUTPATIENT)
Age: 52
End: 2022-10-26

## 2022-10-26 LAB
ALBUMIN SERPL ELPH-MCNC: 3.8 G/DL — SIGNIFICANT CHANGE UP (ref 3.5–5.2)
ALP SERPL-CCNC: 93 U/L — SIGNIFICANT CHANGE UP (ref 30–115)
ALT FLD-CCNC: 16 U/L — SIGNIFICANT CHANGE UP (ref 0–41)
ANION GAP SERPL CALC-SCNC: 8 MMOL/L — SIGNIFICANT CHANGE UP (ref 7–14)
AST SERPL-CCNC: 30 U/L — SIGNIFICANT CHANGE UP (ref 0–41)
BASOPHILS # BLD AUTO: 0.03 K/UL — SIGNIFICANT CHANGE UP (ref 0–0.2)
BASOPHILS NFR BLD AUTO: 0.5 % — SIGNIFICANT CHANGE UP (ref 0–1)
BILIRUB SERPL-MCNC: 0.3 MG/DL — SIGNIFICANT CHANGE UP (ref 0.2–1.2)
BUN SERPL-MCNC: 20 MG/DL — SIGNIFICANT CHANGE UP (ref 10–20)
CALCIUM SERPL-MCNC: 9 MG/DL — SIGNIFICANT CHANGE UP (ref 8.4–10.5)
CHLORIDE SERPL-SCNC: 106 MMOL/L — SIGNIFICANT CHANGE UP (ref 98–110)
CO2 SERPL-SCNC: 26 MMOL/L — SIGNIFICANT CHANGE UP (ref 17–32)
CREAT SERPL-MCNC: 1.2 MG/DL — SIGNIFICANT CHANGE UP (ref 0.7–1.5)
CULTURE RESULTS: SIGNIFICANT CHANGE UP
CULTURE RESULTS: SIGNIFICANT CHANGE UP
EGFR: 72 ML/MIN/1.73M2 — SIGNIFICANT CHANGE UP
EOSINOPHIL # BLD AUTO: 0.16 K/UL — SIGNIFICANT CHANGE UP (ref 0–0.7)
EOSINOPHIL NFR BLD AUTO: 2.9 % — SIGNIFICANT CHANGE UP (ref 0–8)
GLUCOSE BLDC GLUCOMTR-MCNC: 102 MG/DL — HIGH (ref 70–99)
GLUCOSE BLDC GLUCOMTR-MCNC: 107 MG/DL — HIGH (ref 70–99)
GLUCOSE BLDC GLUCOMTR-MCNC: 112 MG/DL — HIGH (ref 70–99)
GLUCOSE BLDC GLUCOMTR-MCNC: 124 MG/DL — HIGH (ref 70–99)
GLUCOSE SERPL-MCNC: 106 MG/DL — HIGH (ref 70–99)
HCT VFR BLD CALC: 35.9 % — LOW (ref 42–52)
HGB BLD-MCNC: 11 G/DL — LOW (ref 14–18)
IMM GRANULOCYTES NFR BLD AUTO: 0.2 % — SIGNIFICANT CHANGE UP (ref 0.1–0.3)
LYMPHOCYTES # BLD AUTO: 0.95 K/UL — LOW (ref 1.2–3.4)
LYMPHOCYTES # BLD AUTO: 17 % — LOW (ref 20.5–51.1)
MCHC RBC-ENTMCNC: 23.7 PG — LOW (ref 27–31)
MCHC RBC-ENTMCNC: 30.6 G/DL — LOW (ref 32–37)
MCV RBC AUTO: 77.4 FL — LOW (ref 80–94)
MONOCYTES # BLD AUTO: 0.62 K/UL — HIGH (ref 0.1–0.6)
MONOCYTES NFR BLD AUTO: 11.1 % — HIGH (ref 1.7–9.3)
MRSA PCR RESULT.: NEGATIVE — SIGNIFICANT CHANGE UP
NEUTROPHILS # BLD AUTO: 3.81 K/UL — SIGNIFICANT CHANGE UP (ref 1.4–6.5)
NEUTROPHILS NFR BLD AUTO: 68.3 % — SIGNIFICANT CHANGE UP (ref 42.2–75.2)
NRBC # BLD: 0 /100 WBCS — SIGNIFICANT CHANGE UP (ref 0–0)
PLATELET # BLD AUTO: 211 K/UL — SIGNIFICANT CHANGE UP (ref 130–400)
POTASSIUM SERPL-MCNC: 4.3 MMOL/L — SIGNIFICANT CHANGE UP (ref 3.5–5)
POTASSIUM SERPL-SCNC: 4.3 MMOL/L — SIGNIFICANT CHANGE UP (ref 3.5–5)
PROT SERPL-MCNC: 5.8 G/DL — LOW (ref 6–8)
RBC # BLD: 4.64 M/UL — LOW (ref 4.7–6.1)
RBC # FLD: 13.3 % — SIGNIFICANT CHANGE UP (ref 11.5–14.5)
SODIUM SERPL-SCNC: 140 MMOL/L — SIGNIFICANT CHANGE UP (ref 135–146)
SPECIMEN SOURCE: SIGNIFICANT CHANGE UP
SPECIMEN SOURCE: SIGNIFICANT CHANGE UP
WBC # BLD: 5.58 K/UL — SIGNIFICANT CHANGE UP (ref 4.8–10.8)
WBC # FLD AUTO: 5.58 K/UL — SIGNIFICANT CHANGE UP (ref 4.8–10.8)

## 2022-10-26 PROCEDURE — 99232 SBSQ HOSP IP/OBS MODERATE 35: CPT | Mod: 24

## 2022-10-26 PROCEDURE — 99233 SBSQ HOSP IP/OBS HIGH 50: CPT

## 2022-10-26 RX ORDER — AZTREONAM 2 G
1 VIAL (EA) INJECTION
Qty: 14 | Refills: 0
Start: 2022-10-26 | End: 2022-11-01

## 2022-10-26 RX ADMIN — Medication 100 MILLIGRAM(S): at 12:08

## 2022-10-26 RX ADMIN — Medication 100 MILLIGRAM(S): at 17:14

## 2022-10-26 RX ADMIN — Medication 100 MILLIGRAM(S): at 23:11

## 2022-10-26 RX ADMIN — Medication 500 MILLIGRAM(S): at 12:57

## 2022-10-26 RX ADMIN — LOSARTAN POTASSIUM 50 MILLIGRAM(S): 100 TABLET, FILM COATED ORAL at 05:55

## 2022-10-26 RX ADMIN — Medication 500 MILLIGRAM(S): at 17:13

## 2022-10-26 RX ADMIN — Medication 500 MILLIGRAM(S): at 23:04

## 2022-10-26 NOTE — DISCHARGE NOTE PROVIDER - HOSPITAL COURSE
HPI:  52-year-old male history of diabetes,  OM/ Septic arthritis right second toe s/p debridement  presenting to ED for evaluation and IV antibiotic. Patient was seen yesterday for right foot numbness and discomfort. He reported  right second toe increased deformity.  Patient states he's been going to the gym for last 2 months, denies any trauma or pain.  He was called back to ED today after x-ray revealed questionable osteomyelitis/septic arthritis. Patient denies any fevers or chills or other symptoms,    ED course:  /82, HR 84, RR 18, T 97.8, spo2 99% on RA. WBC 4.13, hgb 11.5, cr 1.6. Xray right foot showed Destruction of the second proximal interphalangeal joint which could reflect septic arthritis. Fragmentation the medial sesamoid. Questionable chronic changes. Acute fracture cannot be excluded. Dystrophic calcifications in the region of the plantar fascia.    Hospital course:  Pt admitted for acute septic arthritis/acute osteo of the second proximal interphalangeal joint. Pt received ancef and flagyl. Right foot 2nd IPJ debridement and bone excision was performed 10/25. Per podiatry bone excision performed with clean margins. Pt will need f/u on final Path. The OR cx are negative as of 10/26. ID followed and recommended discharge on PO Bactrim 1 DS BID 7 days. Pt is stable for discharge home.

## 2022-10-26 NOTE — DISCHARGE NOTE PROVIDER - NSDCCPCAREPLAN_GEN_ALL_CORE_FT
PRINCIPAL DISCHARGE DIAGNOSIS  Diagnosis: Acute osteomyelitis  Assessment and Plan of Treatment: you were seen by podiatry for osteomyelitis of your toe. Please follow-up [facility] podiatry and your PCP for continued management.

## 2022-10-26 NOTE — PROGRESS NOTE ADULT - SUBJECTIVE AND OBJECTIVE BOX
52-year-old male history of diabetes presenting to ED for right toe pain.   Found to have acute septic arthritis/acute osteo of the second proximal interphalangeal joint. Pt underwent debridement by podiatry with clear surgical margins, will plan discharge on po Bactrim.  Today pt is comfortable, not in pain, ambulating.      Vital Signs Last 24 Hrs  T(C): 36.7 (26 Oct 2022 12:29), Max: 36.8 (26 Oct 2022 04:57)  T(F): 98 (26 Oct 2022 12:29), Max: 98.3 (26 Oct 2022 04:57)  HR: 76 (26 Oct 2022 12:29) (75 - 79)  BP: 146/71 (26 Oct 2022 12:29) (126/66 - 146/71)  BP(mean): --  RR: 20 (26 Oct 2022 12:29) (18 - 20)  SpO2: 97% (25 Oct 2022 17:08) (97% - 97%)    Parameters below as of 25 Oct 2022 17:08  Patient On (Oxygen Delivery Method): room air      PHYSICAL EXAM:  GENERAL: NAD, pleasant  PULMONARY: Clear to auscultation bilaterally; no wheezes, rales, or rhonchi  CARDIOVASCULAR: Regular rate and rhythm; no murmurs, rubs, or gallops  GASTROINTESTINAL: Soft, non-tender, non-distended; bowel sounds present  MUSCULOSKELETAL: 2+ peripheral pulses; no clubbing, no cyanosis, no edema  EXTR: dressing on right foot   NEUROLOGY: non-focal    LABs:                         11.0   5.58  )-----------( 211      ( 26 Oct 2022 07:37 )             35.9   10-26    140  |  106  |  20  ----------------------------<  106<H>  4.3   |  26  |  1.2    Ca    9.0      26 Oct 2022 07:37    TPro  5.8<L>  /  Alb  3.8  /  TBili  0.3  /  DBili  x   /  AST  30  /  ALT  16  /  AlkPhos  93  10-26    Surgical Pathology Report:   ACCESSION No: [id number]   MAUREEN CASTAÑEDA 1   Surgical Final Report   Final Diagnosis   Bone right foot:   Reactive bone showing fibrosis in the marrow spaces with few   scattered neutrophils and histiocytes. no plasma cells   are seen. these finding are suggestive of acute osteomyelitis.   Verified by: Lisa Hopkins M.D.   RADIOLOGY:  < from: Xray Foot AP + Lateral + Oblique, Right (10.25.22 @ 14:36) >  FINDINGS/  IMPRESSION:    Post debridement of the second toe with partial resection of the second   proximal phalanx. Septic arthritis/osteomyelitis with destruction of the   second PIP joint again seen. Associated dorsal forefoot soft tissue   swelling.    Degenerative change throughout the foot. Chronic ossification in the   plantar fascia consistent with chronic inflammation. Vascular   calcifications.    < end of copied text >  < from: VA Duplex Lower Extrem Arterial, Bilat (10.24.22 @ 16:17) >  Impression:    Normal arterial flow in the bilateral lower extremities.      MEDICATIONS  (STANDING):  ceFAZolin   IVPB 2000 milliGRAM(s) IV Intermittent every 8 hours  clotrimazole 1% Cream 1 Application(s) Topical two times a day  enoxaparin Injectable 40 milliGRAM(s) SubCutaneous every 24 hours  losartan 50 milliGRAM(s) Oral daily  metroNIDAZOLE    Tablet 500 milliGRAM(s) Oral every 8 hours

## 2022-10-26 NOTE — PROGRESS NOTE ADULT - ASSESSMENT
ASSESSMENT  52-year-old male history of diabetes,  OM/ Septic arthritis right second toe s/p debridement  presenting to ED for evaluation and IV antibiotic. Patient was seen yesterday for right foot numbness and discomfort. He reported  right second toe increased deformity.  Patient states he's been going to the gym for last 2 months, denies any trauma or pain.  He was called back to ED today after x-ray revealed questionable osteomyelitis/septic arthritis. Patient denies any fevers or chills or other symptoms,    In the ED: /82, HR 84, RR 18, T 97.8, spo2 99% on RA. WBC 4.13, hgb 11.5, cr 1.6. Xray right foot showed Destruction of the second proximal interphalangeal joint which could reflect septic arthritis. Fragmentation the medial sesamoid. Questionable chronic changes. Acute fracture cannot be excluded. Dystrophic calcifications in the region of the plantar fascia.    IMPRESSION  #Septic arthritis/osteomyelitis right 2nd toe in pt with DM, S/P surgery. No prior path report nor cultures on [place]  s/p Partial excision of bone of foot 25-Oct-2022 13:43:15 Right 2nd toe IPJ osteomyelitis and septic arthritis Roxy Valencia.  Right foot 2nd toe IPJ bone excision; septic arthritis present  Possible chronic as unremarkable inflammatory markers  Sedimentation Rate, Erythrocyte: 7 mm/Hr (10-21-22 @ 10:50)  C-Reactive Protein, Serum: 3.0 mg/L (10-21-22 @ 10:50)  < from: MR Foot w/wo IV Cont, Right (10.21.22 @ 12:30) >  Septic arthritis/osteomyelitis of the second proximal interphalangeal joint.  Osteoarthritis of the forefoot joints.  2 cm adventitial bursitis plantar to the first MTP.  Right foot xray with destruction at the level of the second proximal interphalangeal joint. Questionable osteomyelitis. Erosive change is seen in the region of the distal proximal fifth phalanx.   ESR 3, CRP 3    On ceFAZolin   IVPB 2000 milliGRAM(s) IV Intermittent every 8 hours    Cr 1.6      RECOMMEND  - Good margins per Podiatry  - f/u final Path, OR cx thus far NG  - D/C on PO Bactrim 1 DS BID 7 days     If any questions, please call or send a message on [employer] Teams  Please continue to update ID with any pertinent new laboratory or radiographic findings  Spectra [id number]

## 2022-10-26 NOTE — PROGRESS NOTE ADULT - ASSESSMENT
52-year-old male history of diabetes presenting to ED for right toe pain.   Found to have acute septic arthritis/acute osteo of the second proximal interphalangeal joint.    A/P   #Septic arthritis/osteomyelitis of the second proximal interphalangeal joint/ Onychomycosis   - s/p debridement and long course of abx   - ESR, CRP wnl   - s/p debridement on 10/25, podiatry is following , per podiatry there were clear margins  - c/w local wound care   - c/w Ancef and Flagyl and DC on bactrim  - arterial duplex normal   - started clotrimazole for toenail fungal infection to reduce risk of infection     #CKD 3   - stable at baseline   - monitor BUN/cr   - avoid nephrotoxic medications     #Hx of DM   - carb consistent diet   - monitor finger stick   - Insulin coverage while in the hospital     DVT ppx: Lovenox    Dispo: anticipate discharge in 24 hours ( pt'll be discharged to shelter)

## 2022-10-26 NOTE — PROGRESS NOTE ADULT - SUBJECTIVE AND OBJECTIVE BOX
[facility] day: 5      SUBJECTIVE:   Complaints: none  Overnight events: none    OBJECTIVE:   T(C): 36.7 (10-26-22 @ 12:29), Max: 36.8 (10-26-22 @ 04:57)  HR: 76 (10-26-22 @ 12:29) (75 - 79)  BP: 146/71 (10-26-22 @ 12:29) (126/66 - 146/71)  RR: 20 (10-26-22 @ 12:29) (18 - 20)  SpO2: 97% (10-25-22 @ 17:08) (97% - 97%)    PHYSICAL EXAM:  GENERAL: NADs  PULMONARY: Clear to auscultation bilaterally; no wheezes, rales, or rhonchi  CARDIOVASCULAR: Regular rate and rhythm; no murmurs, rubs, or gallops  GASTROINTESTINAL: Soft, non-tender, non-distended; bowel sounds present  MUSCULOSKELETAL: 2+ peripheral pulses; no clubbing, no cyanosis, no edema  NEUROLOGY: non-focal    MEDICATIONS  ceFAZolin   IVPB 2000 milliGRAM(s) IV Intermittent every 8 hours  clotrimazole 1% Cream 1 Application(s) Topical two times a day  enoxaparin Injectable 40 milliGRAM(s) SubCutaneous every 24 hours  losartan 50 milliGRAM(s) Oral daily  metroNIDAZOLE    Tablet 500 milliGRAM(s) Oral every 8 hours      LABS:  CBC Full  -  ( 26 Oct 2022 07:37 )  WBC Count : 5.58 K/uL  RBC Count : 4.64 M/uL  Hemoglobin : 11.0 g/dL  Hematocrit : 35.9 %  Platelet Count - Automated : 211 K/uL  Mean Cell Volume : 77.4 fL  Mean Cell Hemoglobin : 23.7 pg  Mean Cell Hemoglobin Concentration : 30.6 g/dL  Auto Neutrophil # : 3.81 K/uL  Auto Lymphocyte # : 0.95 K/uL  Auto Monocyte # : 0.62 K/uL  Auto Eosinophil # : 0.16 K/uL  Auto Basophil # : 0.03 K/uL  Auto Neutrophil % : 68.3 %  Auto Lymphocyte % : 17.0 %  Auto Monocyte % : 11.1 %  Auto Eosinophil % : 2.9 %  Auto Basophil % : 0.5 %    10-26    140  |  106  |  20  ----------------------------<  106<H>  4.3   |  26  |  1.2    Ca    9.0      26 Oct 2022 07:37    TPro  5.8<L>  /  Alb  3.8  /  TBili  0.3  /  DBili  x   /  AST  30  /  ALT  16  /  AlkPhos  93  10-26

## 2022-10-26 NOTE — PROGRESS NOTE ADULT - SUBJECTIVE AND OBJECTIVE BOX
Podiatry Progress Note    Subjective:  MAUREEN CASTAÑEDA is a  52y Male.   Seen bedside.   Patient is a 52y old  Male who presents with a chief complaint of Osteomyelitis (25 Oct 2022 08:24)      Past Medical History and Surgical History  PAST MEDICAL & SURGICAL HISTORY:  Diabetes      Hypertension      History of penile disorder      Gallstones      History of eye surgery           Objective:  Vital Signs Last 24 Hrs  T(C): 36.8 (26 Oct 2022 04:57), Max: 36.8 (26 Oct 2022 04:57)  T(F): 98.3 (26 Oct 2022 04:57), Max: 98.3 (26 Oct 2022 04:57)  HR: 79 (26 Oct 2022 04:57) (72 - 84)  BP: 144/78 (26 Oct 2022 04:57) (126/66 - 198/107)  BP(mean): --  RR: 18 (26 Oct 2022 04:57) (14 - 20)  SpO2: 97% (25 Oct 2022 17:08) (97% - 100%)    Parameters below as of 25 Oct 2022 17:08  Patient On (Oxygen Delivery Method): room air                            11.0   5.58  )-----------( 211      ( 26 Oct 2022 07:37 )             35.9                 10-26    140  |  106  |  20  ----------------------------<  106<H>  4.3   |  26  |  1.2    Ca    9.0      26 Oct 2022 07:37    TPro  5.8<L>  /  Alb  3.8  /  TBili  0.3  /  DBili  x   /  AST  30  /  ALT  16  /  AlkPhos  93  10-26      Culture - Tissue with Gram Stain (collected 10-25-22 @ 17:39)  Source: .Tissue None  Gram Stain (10-25-22 @ 23:10):    No polymorphonuclear cells seen per low power field    No organisms seen        Physical Exam - Lower Extremity Focused:   Derm: Right foot sutures intact without signs of dehiscence, strikethrough bleeding noted to dressings, no active drainage today  Vascular: DP and PT Pulses Diminished; Foot is Warm to Warm to the touch; Capillary Refill Time < 3 Seconds;    Neuro: Protective Sensation Diminished / Moderately Neuropathic   MSK: Pain On Palpation at Wound Site     Assessment:  -s/p Right foot 2nd IPJ debridement and bone excision      Plan:  Chart reviewed and Patient evaluated. All Questions and Concerns Addressed and Answered  Local Wound Care; Wound Flushed w/ NS; Wound Packed w/ xeroform / DSD / Kerlix   Weight Bearing Status; WBAT w/ Heel Touch w/ Surgical Shoe;   Please dispense surgical shoe to patient bedside; orders placed  Continue w/ Local Wound Care; Q24 Dressing Changes;  No Further Sx Debridement;   Patient Stable Per Podiatry Standpoint; Follow Up as an Outpatient w/ Dr. Fisher 1 week post discharge  Discussed Plan w/ Attending; Dr. Fisher    Podiatry

## 2022-10-26 NOTE — PROGRESS NOTE ADULT - NUTRITIONAL ASSESSMENT
52-year-old male history of diabetes presenting to ED for right toe pain.   Found to have acute septic arthritis/acute osteo of the second proximal interphalangeal joint.    #Septic arthritis/osteomyelitis of the second proximal interphalangeal joint  #Sepsis ruled out admission   #Has hx of OM/ Septic arthritis right second toe s/p debridement and long course of abx   - ESR, CRP wnl   - MRI R  foot Septic arthritis/osteomyelitis of the second proximal interphalangeal joint.  - Podiatry f/u - pending surgical debridement, possible amputation today   - C/w Ancef and Flagyl   - ID f/u --> will give final recs pot OR   - arterial duplex normal   - s/p Right foot 2nd IPJ debridement and bone excision; per podiatry there were clear margins   - ID recommends bactrim  - started clotrimazole for toenail fungal infection to reduce risk of infection     #CKD 3   - stable    #Hx of DM   -start insulin basal bolus if finger stick >180     DVT ppx: Lovenox    Pending: OR tomorrow   Plan of care d/w patient   Dispo: DC tomorrow pending placement 52-year-old male history of diabetes presenting to ED for right toe pain.   Found to have acute septic arthritis/acute osteo of the second proximal interphalangeal joint.    #Septic arthritis/osteomyelitis of the second proximal interphalangeal joint  #Sepsis ruled out admission   #Has hx of OM/ Septic arthritis right second toe s/p debridement and long course of abx   - ESR, CRP wnl   - MRI R  foot Septic arthritis/osteomyelitis of the second proximal interphalangeal joint.  - Podiatry f/u - pending surgical debridement, possible amputation today   - d/c Ancef and Flagyl   - arterial duplex normal   - s/p Right foot 2nd IPJ debridement and bone excision; per podiatry there were clear margins   - ID recommends bactrim  - started clotrimazole for toenail fungal infection to reduce risk of infection     #CKD 3   - stable    #Hx of DM   -start insulin basal bolus if finger stick >180     DVT ppx: Lovenox    Pending: OR tomorrow   Plan of care d/w patient   Dispo: DC tomorrow pending placement 52-year-old male history of diabetes presenting to ED for right toe pain.   Found to have acute septic arthritis/acute osteo of the second proximal interphalangeal joint.    #Septic arthritis/osteomyelitis of the second proximal interphalangeal joint  #Sepsis ruled out admission   #Has hx of OM/ Septic arthritis right second toe s/p debridement and long course of abx   - ESR, CRP wnl   - MRI R  foot Septic arthritis/osteomyelitis of the second proximal interphalangeal joint.  - Podiatry f/u - pending surgical debridement, possible amputation today   - c/w Ancef and Flagyl and DC on bactrim  - arterial duplex normal   - s/p Right foot 2nd IPJ debridement and bone excision; per podiatry there were clear margins   - started clotrimazole for toenail fungal infection to reduce risk of infection     #CKD 3   - stable    #Hx of DM   -start insulin basal bolus if finger stick >180     DVT ppx: Lovenox    Pending: OR tomorrow   Plan of care d/w patient   Dispo: DC tomorrow pending placement

## 2022-10-26 NOTE — DISCHARGE NOTE NURSING/CASE MANAGEMENT/SOCIAL WORK - NSDCPEFALRISK_GEN_ALL_CORE
For information on Fall & Injury Prevention, visit: [email or web address] OR  [email or web address] OR  https://www.cdc.gov/steadi/patient.html

## 2022-10-26 NOTE — PROGRESS NOTE ADULT - ASSESSMENT
[facility] day: 5      SUBJECTIVE:   Complaints: none  Overnight events: none    OBJECTIVE:   T(C): 36.7 (10-26-22 @ 12:29), Max: 36.8 (10-26-22 @ 04:57)  HR: 76 (10-26-22 @ 12:29) (75 - 79)  BP: 146/71 (10-26-22 @ 12:29) (126/66 - 146/71)  RR: 20 (10-26-22 @ 12:29) (18 - 20)  SpO2: 97% (10-25-22 @ 17:08) (97% - 97%)    PHYSICAL EXAM:  GENERAL: NADs  PULMONARY: Clear to auscultation bilaterally; no wheezes, rales, or rhonchi  CARDIOVASCULAR: Regular rate and rhythm; no murmurs, rubs, or gallops  GASTROINTESTINAL: Soft, non-tender, non-distended; bowel sounds present  MUSCULOSKELETAL: 2+ peripheral pulses; no clubbing, no cyanosis, no edema  NEUROLOGY: non-focal    MEDICATIONS  ceFAZolin   IVPB 2000 milliGRAM(s) IV Intermittent every 8 hours  clotrimazole 1% Cream 1 Application(s) Topical two times a day  enoxaparin Injectable 40 milliGRAM(s) SubCutaneous every 24 hours  losartan 50 milliGRAM(s) Oral daily  metroNIDAZOLE    Tablet 500 milliGRAM(s) Oral every 8 hours      LABS:  CBC Full  -  ( 26 Oct 2022 07:37 )  WBC Count : 5.58 K/uL  RBC Count : 4.64 M/uL  Hemoglobin : 11.0 g/dL  Hematocrit : 35.9 %  Platelet Count - Automated : 211 K/uL  Mean Cell Volume : 77.4 fL  Mean Cell Hemoglobin : 23.7 pg  Mean Cell Hemoglobin Concentration : 30.6 g/dL  Auto Neutrophil # : 3.81 K/uL  Auto Lymphocyte # : 0.95 K/uL  Auto Monocyte # : 0.62 K/uL  Auto Eosinophil # : 0.16 K/uL  Auto Basophil # : 0.03 K/uL  Auto Neutrophil % : 68.3 %  Auto Lymphocyte % : 17.0 %  Auto Monocyte % : 11.1 %  Auto Eosinophil % : 2.9 %  Auto Basophil % : 0.5 %    10-26    140  |  106  |  20  ----------------------------<  106<H>  4.3   |  26  |  1.2    Ca    9.0      26 Oct 2022 07:37    TPro  5.8<L>  /  Alb  3.8  /  TBili  0.3  /  DBili  x   /  AST  30  /  ALT  16  /  AlkPhos  93  10-26   52-year-old male history of diabetes presenting to ED for right toe pain.   Found to have acute septic arthritis/acute osteo of the second proximal interphalangeal joint.    #Septic arthritis/osteomyelitis of the second proximal interphalangeal joint  #Sepsis ruled out admission   #Has hx of OM/ Septic arthritis right second toe s/p debridement and long course of abx   - ESR, CRP wnl   - MRI R  foot Septic arthritis/osteomyelitis of the second proximal interphalangeal joint.  - Podiatry f/u - pending surgical debridement, possible amputation today   - c/w Ancef and Flagyl and DC on bactrim  - arterial duplex normal   - s/p Right foot 2nd IPJ debridement and bone excision; per podiatry there were clear margins   - started clotrimazole for toenail fungal infection to reduce risk of infection     #CKD 3   - stable    #Hx of DM   -start insulin basal bolus if finger stick >180     DVT ppx: Lovenox    Pending: OR tomorrow   Plan of care d/w patient   Dispo: DC tomorrow pending placement

## 2022-10-26 NOTE — PROGRESS NOTE ADULT - SUBJECTIVE AND OBJECTIVE BOX
MAUREEN CASTAÑEDA  52y, Male  Allergy: No Known Allergies      LOS  5d    CHIEF COMPLAINT: Osteomyelitis (26 Oct 2022 08:46)      INTERVAL EVENTS/HPI  - No acute events overnight s/p OR  - T(F): , Max: 98.3 (10-26-22 @ 04:57)  - Denies any worsening symptoms  - Tolerating medication  - WBC Count: 5.58 (10-26-22 @ 07:37)  WBC Count: 4.08 (10-25-22 @ 08:43)     - Creatinine, Serum: 1.2 (10-26-22 @ 07:37)  Creatinine, Serum: 1.1 (10-25-22 @ 08:43)       ROS  ***     VITALS:  T(F): 98.3, Max: 98.3 (10-26-22 @ 04:57)  HR: 79  BP: 144/78  RR: 18Vital Signs Last 24 Hrs  T(C): 36.8 (26 Oct 2022 04:57), Max: 36.8 (26 Oct 2022 04:57)  T(F): 98.3 (26 Oct 2022 04:57), Max: 98.3 (26 Oct 2022 04:57)  HR: 79 (26 Oct 2022 04:57) (72 - 84)  BP: 144/78 (26 Oct 2022 04:57) (126/66 - 198/107)  BP(mean): --  RR: 18 (26 Oct 2022 04:57) (14 - 20)  SpO2: 97% (25 Oct 2022 17:08) (97% - 100%)    Parameters below as of 25 Oct 2022 17:08  Patient On (Oxygen Delivery Method): room air        PHYSICAL EXAM:  ***     FH: Non-contributory  Social Hx: Non-contributory    TESTS & MEASUREMENTS:                        11.0   5.58  )-----------( 211      ( 26 Oct 2022 07:37 )             35.9     10-26    140  |  106  |  20  ----------------------------<  106<H>  4.3   |  26  |  1.2    Ca    9.0      26 Oct 2022 07:37    TPro  5.8<L>  /  Alb  3.8  /  TBili  0.3  /  DBili  x   /  AST  30  /  ALT  16  /  AlkPhos  93  10-26      LIVER FUNCTIONS - ( 26 Oct 2022 07:37 )  Alb: 3.8 g/dL / Pro: 5.8 g/dL / ALK PHOS: 93 U/L / ALT: 16 U/L / AST: 30 U/L / GGT: x               Culture - Tissue with Gram Stain (collected 10-25-22 @ 17:39)  Source: .Tissue None  Gram Stain (10-25-22 @ 23:10):    No polymorphonuclear cells seen per low power field    No organisms seen    Culture - Blood (collected 10-20-22 @ 23:35)  Source: .Blood Blood-Peripheral  Final Report (10-26-22 @ 07:00):    No Growth Final    Culture - Blood (collected 10-20-22 @ 23:35)  Source: .Blood Blood-Peripheral  Final Report (10-26-22 @ 07:00):    No Growth Final            INFECTIOUS DISEASES TESTING  COVID-19 PCR: Detected (06-06-22 @ 10:20)      INFLAMMATORY MARKERS  Sedimentation Rate, Erythrocyte: 7 mm/Hr (10-21-22 @ 10:50)  C-Reactive Protein, Serum: 3.0 mg/L (10-21-22 @ 10:50)  Sedimentation Rate, Erythrocyte: 3 mm/Hr (10-21-22 @ 00:05)  C-Reactive Protein, Serum: 3.0 mg/L (10-21-22 @ 00:05)      RADIOLOGY & ADDITIONAL TESTS:  I have personally reviewed the last available Chest xray  CXR      CT      CARDIOLOGY TESTING  12 Lead ECG:   Systolic  mmHg    Diastolic  mmHg    Ventricular Rate 84 BPM    Atrial Rate 84 BPM    P-R Interval 150 ms    QRS Duration 88 ms    Q-T Interval 364 ms    QTC Calculation(Bazett) 430 ms    P Axis 70 degrees    R Axis 39 degrees    T Axis3 degrees    Diagnosis Line Normal sinus rhythm  Nonspecific T wave abnormality  Abnormal ECG    Confirmed by Madhu Mann ([id number]) on 10/25/2022 5:37:54 PM (10-25-22 @ 12:25)      MEDICATIONS  ceFAZolin   IVPB 2000 IV Intermittent every 8 hours  enoxaparin Injectable 40 SubCutaneous every 24 hours  losartan 50 Oral daily  metroNIDAZOLE    Tablet 500 Oral every 8 hours      WEIGHT  Weight (kg): 111.1 (06-06-22 @ 09:52)  Creatinine, Serum: 1.2 mg/dL (10-26-22 @ 07:37)      ANTIBIOTICS:  ceFAZolin   IVPB 2000 milliGRAM(s) IV Intermittent every 8 hours  metroNIDAZOLE    Tablet 500 milliGRAM(s) Oral every 8 hours      All available historical records have been reviewed       AMUREEN CASTAÑEDA  52y, Male  Allergy: No Known Allergies      LOS  5d    CHIEF COMPLAINT: Osteomyelitis (26 Oct 2022 08:46)      INTERVAL EVENTS/HPI  - No acute events overnight s/p OR  - T(F): , Max: 98.3 (10-26-22 @ 04:57)  - Denies any worsening symptoms  - Tolerating medication  - WBC Count: 5.58 (10-26-22 @ 07:37)  WBC Count: 4.08 (10-25-22 @ 08:43)     - Creatinine, Serum: 1.2 (10-26-22 @ 07:37)  Creatinine, Serum: 1.1 (10-25-22 @ 08:43)       ROS  General: Denies rigors, nightsweats  HEENT: Denies headache, rhinorrhea, sore throat, eye pain  CV: Denies CP, palpitations  PULM: Denies wheezing, hemoptysis  GI: Denies hematemesis, hematochezia, melena  : Denies discharge, hematuria  MSK: Denies arthralgias, myalgias  SKIN: Denies rash, lesions  NEURO: Denies paresthesias, weakness  PSYCH: Denies depression, anxiety     VITALS:  T(F): 98.3, Max: 98.3 (10-26-22 @ 04:57)  HR: 79  BP: 144/78  RR: 18Vital Signs Last 24 Hrs  T(C): 36.8 (26 Oct 2022 04:57), Max: 36.8 (26 Oct 2022 04:57)  T(F): 98.3 (26 Oct 2022 04:57), Max: 98.3 (26 Oct 2022 04:57)  HR: 79 (26 Oct 2022 04:57) (72 - 84)  BP: 144/78 (26 Oct 2022 04:57) (126/66 - 198/107)  BP(mean): --  RR: 18 (26 Oct 2022 04:57) (14 - 20)  SpO2: 97% (25 Oct 2022 17:08) (97% - 100%)    Parameters below as of 25 Oct 2022 17:08  Patient On (Oxygen Delivery Method): room air        PHYSICAL EXAM:  Gen: NAD, resting in bed  HEENT: Normocephalic, atraumatic  Neck: supple, no lymphadenopathy  CV: Regular rate & regular rhythm  Lungs: decreased BS at bases, no fremitus  Abdomen: Soft, BS present  Ext: Warm, well perfused LE dressings   Neuro: non focal, awake  Skin: no rash, no erythema  Lines: no phlebitis     FH: Non-contributory  Social Hx: Non-contributory    TESTS & MEASUREMENTS:                        11.0   5.58  )-----------( 211      ( 26 Oct 2022 07:37 )             35.9     10-26    140  |  106  |  20  ----------------------------<  106<H>  4.3   |  26  |  1.2    Ca    9.0      26 Oct 2022 07:37    TPro  5.8<L>  /  Alb  3.8  /  TBili  0.3  /  DBili  x   /  AST  30  /  ALT  16  /  AlkPhos  93  10-26      LIVER FUNCTIONS - ( 26 Oct 2022 07:37 )  Alb: 3.8 g/dL / Pro: 5.8 g/dL / ALK PHOS: 93 U/L / ALT: 16 U/L / AST: 30 U/L / GGT: x               Culture - Tissue with Gram Stain (collected 10-25-22 @ 17:39)  Source: .Tissue None  Gram Stain (10-25-22 @ 23:10):    No polymorphonuclear cells seen per low power field    No organisms seen    Culture - Blood (collected 10-20-22 @ 23:35)  Source: .Blood Blood-Peripheral  Final Report (10-26-22 @ 07:00):    No Growth Final    Culture - Blood (collected 10-20-22 @ 23:35)  Source: .Blood Blood-Peripheral  Final Report (10-26-22 @ 07:00):    No Growth Final            INFECTIOUS DISEASES TESTING  COVID-19 PCR: Detected (06-06-22 @ 10:20)      INFLAMMATORY MARKERS  Sedimentation Rate, Erythrocyte: 7 mm/Hr (10-21-22 @ 10:50)  C-Reactive Protein, Serum: 3.0 mg/L (10-21-22 @ 10:50)  Sedimentation Rate, Erythrocyte: 3 mm/Hr (10-21-22 @ 00:05)  C-Reactive Protein, Serum: 3.0 mg/L (10-21-22 @ 00:05)      RADIOLOGY & ADDITIONAL TESTS:  I have personally reviewed the last available Chest xray  CXR      CT      CARDIOLOGY TESTING  12 Lead ECG:   Systolic  mmHg    Diastolic  mmHg    Ventricular Rate 84 BPM    Atrial Rate 84 BPM    P-R Interval 150 ms    QRS Duration 88 ms    Q-T Interval 364 ms    QTC Calculation(Bazett) 430 ms    P Axis 70 degrees    R Axis 39 degrees    T Axis3 degrees    Diagnosis Line Normal sinus rhythm  Nonspecific T wave abnormality  Abnormal ECG    Confirmed by Madhu Mann ([id number]) on 10/25/2022 5:37:54 PM (10-25-22 @ 12:25)      MEDICATIONS  ceFAZolin   IVPB 2000 IV Intermittent every 8 hours  enoxaparin Injectable 40 SubCutaneous every 24 hours  losartan 50 Oral daily  metroNIDAZOLE    Tablet 500 Oral every 8 hours      WEIGHT  Weight (kg): 111.1 (06-06-22 @ 09:52)  Creatinine, Serum: 1.2 mg/dL (10-26-22 @ 07:37)      ANTIBIOTICS:  ceFAZolin   IVPB 2000 milliGRAM(s) IV Intermittent every 8 hours  metroNIDAZOLE    Tablet 500 milliGRAM(s) Oral every 8 hours      All available historical records have been reviewed

## 2022-10-26 NOTE — DISCHARGE NOTE NURSING/CASE MANAGEMENT/SOCIAL WORK - NSDCVIVACCINE_GEN_ALL_CORE_FT
Tdap; 19-Aug-2019 09:38; Katherine Benz ([facility]); [employer]; [id number] (Exp. Date: 15-May-2021); IntraMuscular; Deltoid Right.; 0.5 milliLiter(s); VIS (VIS Published: 09-May-2013, VIS Presented: 19-Aug-2019);

## 2022-10-26 NOTE — DISCHARGE NOTE NURSING/CASE MANAGEMENT/SOCIAL WORK - PATIENT PORTAL LINK FT
You can access the FollowMyHealth Patient Portal offered by [facility] by registering at the following website: [email or web address]. By joining [facility]’s FollowMyHealth portal, you will also be able to view your health information using other applications (apps) compatible with our system.

## 2022-10-26 NOTE — DISCHARGE NOTE PROVIDER - NSDCFUSCHEDAPPT_GEN_ALL_CORE_FT
Morelia Pandya Physician [facility]  [facility] [address] [place] [address]  Scheduled Appointment: 11/21/2022

## 2022-10-26 NOTE — DISCHARGE NOTE NURSING/CASE MANAGEMENT/SOCIAL WORK - NSSCCARECORD_GEN_ALL_CORE
Infusion Nursing Note:  Shaneka Alvarez presents today for C2D8 Eribulin.    Patient seen by provider today: No   present during visit today: Not Applicable.    Note: Pt states she is tolerating chemotherapy very well.  C/O cranium pain that comes/goes, improves with Oxycodone.  See flow sheet for assessment.    Intravenous Access:  Implanted Port.    Treatment Conditions:  Lab Results   Component Value Date    HGB 12.2 02/25/2020     Lab Results   Component Value Date    WBC 2.8 02/25/2020      Lab Results   Component Value Date    ANEU 1.9 02/25/2020     Lab Results   Component Value Date     02/25/2020      Lab Results   Component Value Date     02/25/2020                   Lab Results   Component Value Date    POTASSIUM 3.8 02/25/2020           Lab Results   Component Value Date    MAG 2.0 02/25/2020            Lab Results   Component Value Date    CR 0.52 02/25/2020                   Lab Results   Component Value Date    RAFAELA 8.3 02/25/2020                Lab Results   Component Value Date    BILITOTAL 0.4 02/25/2020           Lab Results   Component Value Date    ALBUMIN 3.4 02/25/2020                    Lab Results   Component Value Date    ALT 25 02/25/2020           Lab Results   Component Value Date    AST 20 02/25/2020       Results reviewed, labs MET treatment parameters, ok to proceed with treatment.      Post Infusion Assessment:  Patient tolerated infusion without incident.  Blood return noted pre and post infusion.  Blood return noted during administration every 10 cc.  Site patent and intact, free from redness, edema or discomfort.  No evidence of extravasations.  Access discontinued per protocol.       Discharge Plan:   Patient discharged in stable condition accompanied by: self.  Departure Mode: Ambulatory.  Pt will RTC 3/18/20 for 3/18/20 for C3D1 Eribulin.    Javier Roland RN                        
[facility]

## 2022-10-26 NOTE — DISCHARGE NOTE PROVIDER - PROVIDER TOKENS
PROVIDER:[TOKEN:[[id number]],FOLLOWUP:[1 week]],PROVIDER:[TOKEN:[[id number]],FOLLOWUP:[2 weeks],ESTABLISHEDPATIENT:[T]]

## 2022-10-26 NOTE — DISCHARGE NOTE PROVIDER - NSDCMRMEDTOKEN_GEN_ALL_CORE_FT
Bactrim  mg-160 mg oral tablet: 1 tab(s) orally 2 times a day   Benadryl Itch Stop Maximum Strength 2% topical gel: Apply topically to affected area every 6 hours   losartan 50 mg oral tablet: 1 tab(s) orally once a day  metFORMIN 500 mg oral tablet, extended release: 1 tab(s) orally once a day  Trulicity Pen:    Bactrim  mg-160 mg oral tablet: 1 tab(s) orally 2 times a day   Benadryl Itch Stop Maximum Strength 2% topical gel: Apply topically to affected area every 6 hours   clotrimazole 1% topical cream: 1 application topically 2 times a day  losartan 50 mg oral tablet: 1 tab(s) orally once a day  metFORMIN 500 mg oral tablet, extended release: 1 tab(s) orally once a day  Trulicity Pen:

## 2022-10-26 NOTE — DISCHARGE NOTE PROVIDER - CARE PROVIDER_API CALL
Shad Fisher (DPM)  Podiatric Medicine and Surgery  [address], [address], [address]  [place] [address]  Phone: [phone]  Fax: [phone]  Follow Up Time: 1 week    Roby Mcgill  Internal Medicine  [address] [place] [address]  Phone: [phone]  Fax: [phone]  Established Patient  Follow Up Time: 2 weeks

## 2022-10-27 VITALS
SYSTOLIC BLOOD PRESSURE: 130 MMHG | TEMPERATURE: 98 F | HEART RATE: 77 BPM | RESPIRATION RATE: 18 BRPM | DIASTOLIC BLOOD PRESSURE: 61 MMHG

## 2022-10-27 LAB
GLUCOSE BLDC GLUCOMTR-MCNC: 108 MG/DL — HIGH (ref 70–99)
SURGICAL PATHOLOGY STUDY: SIGNIFICANT CHANGE UP

## 2022-10-27 PROCEDURE — 99232 SBSQ HOSP IP/OBS MODERATE 35: CPT | Mod: 24

## 2022-10-27 PROCEDURE — 99232 SBSQ HOSP IP/OBS MODERATE 35: CPT

## 2022-10-27 RX ADMIN — LOSARTAN POTASSIUM 50 MILLIGRAM(S): 100 TABLET, FILM COATED ORAL at 05:12

## 2022-10-27 RX ADMIN — Medication 1 APPLICATION(S): at 07:49

## 2022-10-27 RX ADMIN — Medication 100 MILLIGRAM(S): at 09:04

## 2022-10-27 RX ADMIN — Medication 500 MILLIGRAM(S): at 09:07

## 2022-10-27 NOTE — PROGRESS NOTE ADULT - REASON FOR ADMISSION
Osteomyelitis

## 2022-10-27 NOTE — PROGRESS NOTE ADULT - SUBJECTIVE AND OBJECTIVE BOX
52-year-old male history of diabetes presenting to ED for right toe pain.   Found to have acute septic arthritis/acute osteo of the second proximal interphalangeal joint. Pt underwent debridement by podiatry with clear surgical margins, will plan discharge on po Bactrim.  Today pt is comfortable, has no complaints, asking about discharge.        Vital Signs Last 24 Hrs  T(C): 36.6 (27 Oct 2022 04:27), Max: 37.1 (26 Oct 2022 21:15)  T(F): 97.8 (27 Oct 2022 04:27), Max: 98.7 (26 Oct 2022 21:15)  HR: 77 (27 Oct 2022 04:27) (73 - 96)  BP: 130/61 (27 Oct 2022 04:27) (130/61 - 140/73)  BP(mean): --  RR: 18 (27 Oct 2022 04:27) (18 - 18)    PHYSICAL EXAM:  GENERAL: NAD, pleasant  PULMONARY: Clear to auscultation bilaterally; no wheezes, rales, or rhonchi  CARDIOVASCULAR: Regular rate and rhythm; no murmurs, rubs, or gallops  GASTROINTESTINAL: Soft, non-tender, non-distended; bowel sounds present  MUSCULOSKELETAL: 2+ peripheral pulses; no clubbing, no cyanosis, no edema  EXTR: dressing on right foot   NEUROLOGY: non-focal    LABs:                                  11.0   5.58  )-----------( 211      ( 26 Oct 2022 07:37 )             35.9   10-26    140  |  106  |  20  ----------------------------<  106<H>  4.3   |  26  |  1.2    Ca    9.0      26 Oct 2022 07:37    TPro  5.8<L>  /  Alb  3.8  /  TBili  0.3  /  DBili  x   /  AST  30  /  ALT  16  /  AlkPhos  93  10-26    Surgical Pathology Report:   ACCESSION No: [id number]   MAUREEN CASTAÑEDA 1   Surgical Final Report   Final Diagnosis   Bone right foot:   Reactive bone showing fibrosis in the marrow spaces with few   scattered neutrophils and histiocytes. no plasma cells   are seen. these finding are suggestive of acute osteomyelitis.   Verified by: Lisa Hopkins M.D.   RADIOLOGY:  < from: Xray Foot AP + Lateral + Oblique, Right (10.25.22 @ 14:36) >  FINDINGS/  IMPRESSION:    Post debridement of the second toe with partial resection of the second   proximal phalanx. Septic arthritis/osteomyelitis with destruction of the   second PIP joint again seen. Associated dorsal forefoot soft tissue   swelling.    Degenerative change throughout the foot. Chronic ossification in the   plantar fascia consistent with chronic inflammation. Vascular   calcifications.    < end of copied text >  < from: VA Duplex Lower Extrem Arterial, Bilat (10.24.22 @ 16:17) >  Impression:    Normal arterial flow in the bilateral lower extremities.      MEDICATIONS  (STANDING):  ceFAZolin   IVPB 2000 milliGRAM(s) IV Intermittent every 8 hours  clotrimazole 1% Cream 1 Application(s) Topical two times a day  enoxaparin Injectable 40 milliGRAM(s) SubCutaneous every 24 hours  losartan 50 milliGRAM(s) Oral daily  metroNIDAZOLE    Tablet 500 milliGRAM(s) Oral every 8 hours

## 2022-10-27 NOTE — PROGRESS NOTE ADULT - TIME BILLING
I have personally seen and examined this patient.  I have reviewed all pertinent clinical information and reviewed all relevant imaging and diagnostic studies personally.   If possible, I counseled the patient about diagnostic testing and treatment plan.   I discussed my recommendations with the primary team.
I have personally seen and examined this patient.  I have reviewed all pertinent clinical information and reviewed all relevant imaging and diagnostic studies personally.   If possible, I counseled the patient about diagnostic testing and treatment plan.   I discussed my recommendations with the primary team.
direct pt's care, communication with medical team, chart review
direct pt's care, communication with medical team, chart review

## 2022-10-27 NOTE — PROGRESS NOTE ADULT - PROVIDER SPECIALTY LIST ADULT
Podiatry
Hospitalist
Infectious Disease
Internal Medicine
Internal Medicine
Podiatry
Podiatry
Hospitalist
Internal Medicine
Infectious Disease

## 2022-10-27 NOTE — PROGRESS NOTE ADULT - ASSESSMENT
52-year-old male history of diabetes presenting to ED for right toe pain.   Found to have acute septic arthritis/acute osteo of the second proximal interphalangeal joint.    A/P   #Septic arthritis/osteomyelitis of the second proximal interphalangeal joint/ Onychomycosis   - s/p debridement and long course of abx   - ESR, CRP wnl   - s/p debridement on 10/25, podiatry is following , per podiatry there were clear margins  - c/w local wound care   -  DC on bactrim  - arterial duplex normal   - started clotrimazole for toenail fungal infection to reduce risk of infection     #CKD 3   - stable at baseline   - monitor BUN/cr   - avoid nephrotoxic medications     #Hx of DM   - carb consistent diet   - monitor finger stick   - Insulin coverage while in the hospital     DVT ppx: Lovenox    Dispo: pt is stable for discharge to  shelter, will make arrangements for home care and VNS at pt's cousin's house

## 2022-10-27 NOTE — PROGRESS NOTE ADULT - SUBJECTIVE AND OBJECTIVE BOX
Podiatry Progress Note    Subjective:  MAUREEN CASTAÑEDA is a  52y Male.   Seen bedside.   Patient is a 52y old  Male who presents with a chief complaint of Osteomyelitis (26 Oct 2022 16:54)      Past Medical History and Surgical History  PAST MEDICAL & SURGICAL HISTORY:  Diabetes      Hypertension      History of penile disorder      Gallstones      History of eye surgery           Objective:  Vital Signs Last 24 Hrs  T(C): 36.6 (27 Oct 2022 04:27), Max: 37.1 (26 Oct 2022 21:15)  T(F): 97.8 (27 Oct 2022 04:27), Max: 98.7 (26 Oct 2022 21:15)  HR: 77 (27 Oct 2022 04:27) (73 - 96)  BP: 130/61 (27 Oct 2022 04:27) (130/61 - 146/71)  BP(mean): --  RR: 18 (27 Oct 2022 04:27) (18 - 20)  SpO2: --                            11.0   5.58  )-----------( 211      ( 26 Oct 2022 07:37 )             35.9                 10-26    140  |  106  |  20  ----------------------------<  106<H>  4.3   |  26  |  1.2    Ca    9.0      26 Oct 2022 07:37    TPro  5.8<L>  /  Alb  3.8  /  TBili  0.3  /  DBili  x   /  AST  30  /  ALT  16  /  AlkPhos  93  10-26        Physical Exam - Lower Extremity Focused:   Derm: Right foot sutures intact without signs of dehiscence, strikethrough bleeding noted to dressings, no active drainage today  Vascular: DP and PT Pulses Diminished; Foot is Warm to Warm to the touch; Capillary Refill Time < 3 Seconds;    Neuro: Protective Sensation Diminished / Moderately Neuropathic   MSK: Pain On Palpation at Wound Site     Assessment:  -s/p Right foot 2nd IPJ debridement and bone excision      Plan:  Chart reviewed and Patient evaluated. All Questions and Concerns Addressed and Answered  Local Wound Care; Wound Flushed w/ NS; Wound Packed w/ xeroform / DSD / Kerlix   Weight Bearing Status; WBAT w/ Heel Touch w/ Surgical Shoe;   Please dispense surgical shoe to patient bedside; orders placed  Continue w/ Local Wound Care; Q24 Dressing Changes;  No Further Sx Debridement;   Patient Stable Per Podiatry Standpoint; Follow Up as an Outpatient w/ Dr. Fisher 1 week post discharge  Discussed Plan w/ Attending; Dr. Fisher

## 2022-11-03 ENCOUNTER — OUTPATIENT (OUTPATIENT)
Dept: OUTPATIENT SERVICES | Facility: HOSPITAL | Age: 52
LOS: 1 days | Discharge: HOME | End: 2022-11-03

## 2022-11-03 ENCOUNTER — APPOINTMENT (OUTPATIENT)
Dept: PODIATRY | Facility: CLINIC | Age: 52
End: 2022-11-03

## 2022-11-03 ENCOUNTER — RESULT REVIEW (OUTPATIENT)
Age: 52
End: 2022-11-03

## 2022-11-03 DIAGNOSIS — M21.611 BUNION OF RIGHT FOOT: ICD-10-CM

## 2022-11-03 DIAGNOSIS — Z98.890 OTHER SPECIFIED POSTPROCEDURAL STATES: Chronic | ICD-10-CM

## 2022-11-03 PROCEDURE — 73630 X-RAY EXAM OF FOOT: CPT | Mod: 26,50

## 2022-11-03 PROCEDURE — 99024 POSTOP FOLLOW-UP VISIT: CPT

## 2022-11-10 ENCOUNTER — APPOINTMENT (OUTPATIENT)
Dept: PODIATRY | Facility: CLINIC | Age: 52
End: 2022-11-10

## 2022-11-10 ENCOUNTER — OUTPATIENT (OUTPATIENT)
Dept: OUTPATIENT SERVICES | Facility: HOSPITAL | Age: 52
LOS: 1 days | Discharge: HOME | End: 2022-11-10

## 2022-11-10 DIAGNOSIS — Z98.890 OTHER SPECIFIED POSTPROCEDURAL STATES: Chronic | ICD-10-CM

## 2022-11-10 PROCEDURE — 99213 OFFICE O/P EST LOW 20 MIN: CPT | Mod: 24

## 2022-11-10 RX ORDER — CLOTRIMAZOLE 10 MG/ML
1 SOLUTION TOPICAL TWICE DAILY
Qty: 1 | Refills: 5 | Status: ACTIVE | COMMUNITY
Start: 2022-11-10 | End: 1900-01-01

## 2022-11-12 NOTE — CDI QUERY NOTE - NSCDIOTHERTXTBX_GEN_ALL_CORE_HH
Documentation:   ** 10/25 Operative Report:       - Operation Performed: Right second toe interphalangeal joint bone excision.       - Procedure: ...... Exposed the distal tip of the proximal phalanx of the right second toe and the proximal aspect pf the middle phalanx of the right second toe resection was done and sent for the pathology and debrided bone culture. Then proximal bone sample was collected from the proximal aspect of the remaining proximal phalanx and sent for the proximal margin bone culture. Then with the bulb syringe, the surgical site was irrigated.                                                      Query:  For proper understanding of the procedural details,  please clarify if excision phalanx right second toe can be further specified as:  • Diagnostic and therapeutic excision of the right second toe phalanx was done	  • Diagnostic excision of the right second toe phalanx was done	  • Other (Please specify)

## 2022-11-15 DIAGNOSIS — M79.672 PAIN IN LEFT FOOT: ICD-10-CM

## 2022-11-15 DIAGNOSIS — E11.621 TYPE 2 DIABETES MELLITUS WITH FOOT ULCER: ICD-10-CM

## 2022-11-15 DIAGNOSIS — L97.509 NON-PRESSURE CHRONIC ULCER OF OTHER PART OF UNSPECIFIED FOOT WITH UNSPECIFIED SEVERITY: ICD-10-CM

## 2022-11-15 NOTE — PHYSICAL EXAM
[General Appearance - Alert] : alert [General Appearance - In No Acute Distress] : in no acute distress [Ankle Swelling (On Exam)] : not present [Delayed in the Right Toes] : capillary refills normal in right toes [Delayed in the Left Toes] : capillary refills normal in the left toes [2+] : left foot dorsalis pedis 2+ [] : normal gait [Normal Foot/Ankle] : Both lower extremities were exposed and visualized. Standing exam demonstrates normal foot posture and alignment. Hindfoot exam shows no hindfoot valgus or varus [FreeTextEntry1] : Sutures intact at second toe incision site [Sensation] : the sensory exam was normal to light touch and pinprick [Diminished Throughout Right Foot] : diminished sensation with monofilament testing throughout right foot [Diminished Throughout Left Foot] : diminished sensation with monofilament testing throughout left foot

## 2022-11-15 NOTE — HISTORY OF PRESENT ILLNESS
[FreeTextEntry1] : Patient presents for follow up of his left second toe surgery. Sutures in place, no pain today

## 2022-11-15 NOTE — ASSESSMENT
[FreeTextEntry1] : Pt seen and examined, findings discussed\par sutures to be removed next CLinical visit \par Follow up in 1 week

## 2022-11-21 ENCOUNTER — APPOINTMENT (OUTPATIENT)
Dept: PLASTIC SURGERY | Facility: CLINIC | Age: 52
End: 2022-11-21

## 2022-11-21 VITALS — BODY MASS INDEX: 25.39 KG/M2 | HEIGHT: 77 IN | WEIGHT: 215 LBS

## 2022-11-21 DIAGNOSIS — M79.671 PAIN IN RIGHT FOOT: ICD-10-CM

## 2022-11-21 DIAGNOSIS — E11.42 TYPE 2 DIABETES MELLITUS WITH DIABETIC POLYNEUROPATHY: ICD-10-CM

## 2022-11-21 DIAGNOSIS — M79.672 PAIN IN LEFT FOOT: ICD-10-CM

## 2022-11-21 DIAGNOSIS — B35.1 TINEA UNGUIUM: ICD-10-CM

## 2022-11-21 PROCEDURE — 99213 OFFICE O/P EST LOW 20 MIN: CPT

## 2022-11-21 NOTE — ASSESSMENT
[FreeTextEntry1] : 52yoM with PMHx of DM with Grade 3 gynecomastia of bilateral breasts  L>R. Would like to lose additional weight and get closer to 210lbs (217 lbs today in the office)\par \par Recommend double-incision keyhole gynecomastia excision with dermal pedicle (NAC) after additional weight loss.  Weight loss per pt desire.\par \par - Would recommend coming to office ~ 2 months after losing weight in order to get best contour and results\par - Breast mammogram with pt - minimal retroareolar gynecomastia tissues BL\par - photos taken today, will submit after weight loss today to insurance for medically necessary gynecomastia excision via key hole technique\par - Follow up in 2 months for weight loss check and surgical scheduling\par \par Photos were taken with patient permission.\par \par Due to COVID-19, pre-visit patient instructions were explained to the patient and their symptoms were checked upon arrival. Masks were used by the healthcare provider and staff and the examination room was cleaned after the patient visit concluded\par

## 2022-11-21 NOTE — HISTORY OF PRESENT ILLNESS
[FreeTextEntry1] : 53 yo M with PMHx of HTN, Type II DM last HgA1c 9, , GERD who presents today for evaluation of gynecomastia. Patient endorses having enlarged chest since puberty at 11 with gradual increase in size now c/o asymmetry L>R and left breast discomfort. Patient denies any steroid use but has been taking a lot of Zantc for GERD. \par No change in chest size with weight loss in the past. \par No prior endocrine workup. \par \par Occupation - on disability for [employer] \par Social hx - does not smoke cigarettes, no drug use \par Denies any h/o DVT/PE or MRSA infections. \par \par PMD Dr. Mcgill \par \par Interval Hx (8/15/22):  Pt reports no new changes in health, L side has remained more tender than R. Believes his sugars have been betters lately has he has made some changes in his diet. Lab results and mammogram reviewed with pt.  Cr high on labs would recommend follow up with PCP. Mammogram reviewed with minimal retroareolar breast tissue\par \par Inteval hx (11/21/22):  Here for pre-op weight loss check and possible surgical planning.  pt lost 25 lbs, he now 217 lbs.  Pt would like to lose an additional 15 lbs.   His HgA1c 4.6.

## 2022-11-21 NOTE — PHYSICAL EXAM
[de-identified] : well developed pleasant male, NAD [de-identified] : NC/AT [de-identified] : supple [de-identified] : unlabored breathing,  [de-identified] : JOSE ER [de-identified] : no trunk deformities [de-identified] : Neck: no cervical spine point tenderness; bilateral shoulder grooving present\par \par Left breast: no palpable masses, nipple retraction or discharge.\par Right breast: no palpable masses, nipple retraction or discharge.\par Minimal bilateral axillary rolls\par \par Bilateral gynecomastia (L>R) with Grade II/III with no active inframammary fold rash, additional skin laxity after weight loss\par Anticipated volume of resection of EACH breast based on BSA: 400-500 g [de-identified] : soft, nontender

## 2022-11-21 NOTE — ASSESSMENT
[FreeTextEntry1] : Pt seen and examined, findings discussed\par Sutures removed\par Rx clotrimazole 1% solution for fungal nails\par RTC 2 weeks

## 2022-11-21 NOTE — PHYSICAL EXAM
[General Appearance - Alert] : alert [General Appearance - In No Acute Distress] : in no acute distress [2+] : left foot dorsalis pedis 2+ [] : normal gait [Normal Foot/Ankle] : Both lower extremities were exposed and visualized. Standing exam demonstrates normal foot posture and alignment. Hindfoot exam shows no hindfoot valgus or varus [Sensation] : the sensory exam was normal to light touch and pinprick [Diminished Throughout Right Foot] : diminished sensation with monofilament testing throughout right foot [Diminished Throughout Left Foot] : diminished sensation with monofilament testing throughout left foot [Ankle Swelling (On Exam)] : not present [Delayed in the Right Toes] : capillary refills normal in right toes [Delayed in the Left Toes] : capillary refills normal in the left toes [FreeTextEntry1] : Sutures intact at second toe incision site

## 2022-11-22 NOTE — CHART NOTE - NSCHARTNOTEFT_GEN_A_CORE
Podiatry;    [facility] Request for pt Mushtaq Souza on admission 10/21/22;     For proper understanding of the procedural details,  please clarify if excision phalanx right second toe can be further specified as:  • Diagnostic and therapeutic excision of the right second toe phalanx was done     • Diagnostic excision of the right second toe phalanx was done     • Other (Please specify).    - Clinically speaking, pt's right second toe did not have any ulcer, was stable second toe on right foot. However, imaging study showed chronic osteomyelitis and podiatry discussed w/ pt regarding risk and benefit, pt agreed for surgical removal of right second toe phalanx. Right second IPJ was almost already fused in the OR, so podiatry removed distal part of proximal phalanx and proximal aspect of middle phalanx. So the sx procedure can be specified as other as:    - Excision of proximal and middle phalanx of right second toe    Thank you.     Podiatry [phone]; Podiatry;    [facility] Request for pt Mushtaq Souza on admission 10/21/22;     For proper understanding of the procedural details,  please clarify if excision phalanx right second toe can be further specified as:  • Diagnostic and therapeutic excision of the right second toe phalanx was done     • Diagnostic excision of the right second toe phalanx was done     • Other (Please specify).    - Clinically speaking, pt's right second toe did not have any ulcer, was stable second toe on right foot. However, imaging study showed chronic osteomyelitis and podiatry discussed w/ pt regarding risk and benefit, pt agreed for surgical removal of right second toe phalanx. Right second IPJ was almost already fused in the OR, so podiatry removed distal part of proximal phalanx and proximal aspect of middle phalanx. So the sx procedure can be specified as other as:    - Excision of proximal and middle phalanx of right second toe; both diagnostic and therapeutic procedure    Thank you.     Podiatry [phone];

## 2022-11-28 ENCOUNTER — OUTPATIENT (OUTPATIENT)
Dept: OUTPATIENT SERVICES | Facility: HOSPITAL | Age: 52
LOS: 1 days | Discharge: HOME | End: 2022-11-28

## 2022-11-28 ENCOUNTER — APPOINTMENT (OUTPATIENT)
Dept: PODIATRY | Facility: CLINIC | Age: 52
End: 2022-11-28

## 2022-11-28 DIAGNOSIS — Z98.890 OTHER SPECIFIED POSTPROCEDURAL STATES: Chronic | ICD-10-CM

## 2022-11-28 DIAGNOSIS — L97.529 NON-PRESSURE CHRONIC ULCER OF OTHER PART OF LEFT FOOT WITH UNSPECIFIED SEVERITY: ICD-10-CM

## 2022-11-28 DIAGNOSIS — E11.42 TYPE 2 DIABETES MELLITUS WITH DIABETIC POLYNEUROPATHY: ICD-10-CM

## 2022-11-28 DIAGNOSIS — M79.672 PAIN IN LEFT FOOT: ICD-10-CM

## 2022-11-28 PROCEDURE — 99213 OFFICE O/P EST LOW 20 MIN: CPT | Mod: 24

## 2022-12-12 ENCOUNTER — APPOINTMENT (OUTPATIENT)
Dept: PODIATRY | Facility: CLINIC | Age: 52
End: 2022-12-12

## 2022-12-17 NOTE — PHYSICAL EXAM
[General Appearance - Alert] : alert [General Appearance - In No Acute Distress] : in no acute distress [General Appearance - Well Nourished] : well nourished [General Appearance - Well Developed] : well developed [Ankle Swelling Bilaterally] : bilaterally  [2+] : left foot dorsalis pedis 2+ [Skin Turgor] : normal skin turgor [Skin Lesions] : no skin lesions [Sensation] : the sensory exam was normal to light touch and pinprick [Diminished Throughout Right Foot] : diminished sensation with monofilament testing throughout right foot [Diminished Throughout Left Foot] : diminished sensation with monofilament testing throughout left foot [Oriented To Time, Place, And Person] : oriented to person, place, and time [Ankle Swelling (On Exam)] : not present [Varicose Veins Of Lower Extremities] : not present [] : not present [Delayed in the Right Toes] : capillary refills normal in right toes [Delayed in the Left Toes] : capillary refills normal in the left toes [de-identified] : Partial amputation 2nd toe, R foot [Foot Ulcer] : no foot ulcer [Skin Induration] : no skin induration [FreeTextEntry1] : One suture in R foot 2nd digit incision site\par Skin edges well-coapted\par No erythema/edema/drainage to 2nd digit sx site\par Skin b/l feet dry

## 2022-12-17 NOTE — HISTORY OF PRESENT ILLNESS
[Sneakers] : mariam [FreeTextEntry1] : CC: "Left foot checkup"\par HPI:\par -52M presents for 2w f/u s/p Right foot sx ~1 month ago\par -Sutures removed at last visit\par -C/o 1 remaining suture\par -No pain today\par -Reports numbness, at baseline\par -States he has not tried the topical anti-fungal lacquer, he was mistakenly using cream on nails\par

## 2022-12-17 NOTE — ASSESSMENT
[Verbal] : verbal [Patient] : patient [Good - alert, interested, motivated] : Good - alert, interested, motivated [Demonstrates independently] : demonstrates independently [FreeTextEntry1] : Assessment:\par -s/p R 2nd digit sx\par -Onychomycosis\par -Tinea pedis\par \par Plan:\par -Pt seen and examined, findings discussed\par -Suture removed w/suture removal kit\par -Rx Clotrimazole lacquer for fungal nails\par -Continue clotrimazole 1% solution for tinea\par -RTC 1 month

## 2023-01-25 ENCOUNTER — APPOINTMENT (OUTPATIENT)
Dept: PLASTIC SURGERY | Facility: CLINIC | Age: 53
End: 2023-01-25
Payer: MEDICARE

## 2023-01-25 VITALS — HEIGHT: 77 IN | BODY MASS INDEX: 24.79 KG/M2 | WEIGHT: 210 LBS

## 2023-01-25 PROCEDURE — 99214 OFFICE O/P EST MOD 30 MIN: CPT

## 2023-01-25 NOTE — ASSESSMENT
[FreeTextEntry1] : 52yoM with PMHx of DM with Grade 3 gynecomastia of bilateral breasts  L>R. Would like to lose additional weight and get closer to 210lbs (217 lbs today in the office)\par \par Recommend double-incision keyhole gynecomastia excision with dermal pedicle (NAC).\par \par -I had a long discussion regarding the indicated treatment options.\par I explained the benefits, risks, and alternatives of each procedure. The risks include but not limited to bleeding, infection, seroma, hematoma, asymmetry, contour deformity, suboptimal cosmesis, scarring, and keloid formation. He also understands that with ongoing weight loss after surgery he may have deflation and laxity of soft tissue and skin resulting in a suboptimal cosmesis.\par -He understands that he will be wearing a post-operative chest compression garment for an extended period, and he may have post-op drains.\par -In my opinion, pt is poor candidate for suction lipectomy alone to treat gynecomastia.\par -I reviewed risk of keloid scarring\par -All questions were answered.  Pt agreed with the recommended surgery.  Informed consent was obtained.\par -Will schedule for BLAIRE outpatient procedure under [facility]\par \par Photos were taken with patient permission at last visit\par \par Due to COVID-19, pre-visit patient instructions were explained to the patient and their symptoms were checked upon arrival. Masks were used by the healthcare provider and staff and the examination room was cleaned after the patient visit concluded\par

## 2023-01-25 NOTE — HISTORY OF PRESENT ILLNESS
[FreeTextEntry1] : 51 yo M with PMHx of HTN, Type II DM last HgA1c 9, , GERD who presents today for evaluation of gynecomastia. Patient endorses having enlarged chest since puberty at 11 with gradual increase in size now c/o asymmetry L>R and left breast discomfort. Patient denies any steroid use but has been taking a lot of Zantc for GERD. \par No change in chest size with weight loss in the past. \par No prior endocrine workup. \par \par Occupation - on disability for [employer] \par Social hx - does not smoke cigarettes, no drug use \par Denies any h/o DVT/PE or MRSA infections. \par \par PMD Dr. Mcgill \par \par Interval Hx (8/15/22):  Pt reports no new changes in health, L side has remained more tender than R. Believes his sugars have been betters lately has he has made some changes in his diet. Lab results and mammogram reviewed with pt.  Cr high on labs would recommend follow up with PCP. Mammogram reviewed with minimal retroareolar breast tissue\par \par Inteval hx (11/21/22):  Here for pre-op weight loss check and possible surgical planning.  pt lost 25 lbs, he now 217 lbs.  Pt would like to lose an additional 15 lbs.   His HgA1c 4.6. \par \par Interval hx (1/25/23):  here for weight loss check before BL gynecomastia surgery.  he has lost 5 lbs.  he here to confirm surgical treatment.

## 2023-01-25 NOTE — PHYSICAL EXAM
[de-identified] : well developed pleasant male, NAD [de-identified] : NC/AT [de-identified] : supple [de-identified] : unlabored breathing,  [de-identified] : JOSE ER [de-identified] : no trunk deformities [de-identified] : Neck: no cervical spine point tenderness; bilateral shoulder grooving present\par \par Left breast: no palpable masses, nipple retraction or discharge.\par Right breast: no palpable masses, nipple retraction or discharge.\par Minimal bilateral axillary rolls\par \par Bilateral gynecomastia (L>R) with Grade II/III with grade 1 ptosis; no active inframammary fold rash, additional skin laxity after weight loss\par Anticipated volume of resection of EACH breast based on BSA: 400-500 g [de-identified] : soft, nontender

## 2023-02-08 ENCOUNTER — APPOINTMENT (OUTPATIENT)
Dept: PLASTIC SURGERY | Facility: CLINIC | Age: 53
End: 2023-02-08
Payer: MEDICARE

## 2023-02-08 PROCEDURE — 99213 OFFICE O/P EST LOW 20 MIN: CPT

## 2023-02-08 NOTE — ASSESSMENT
[FreeTextEntry1] : 52yoM with PMHx of DM with Grade 3 gynecomastia of bilateral breasts  L>R. Would like to lose additional weight and get closer to 210lbs (217 lbs today in the office)\par \par Recommendation remains double-incision keyhole gynecomastia excision with dermal pedicle (NAC).\par \par -I had a long discussion regarding the indicated treatment options.\par I explained the benefits, risks, and alternatives of each procedure. The risks include but not limited to bleeding, infection, seroma, hematoma, asymmetry, contour deformity, suboptimal cosmesis, scarring, and keloid formation. He also understands that with ongoing weight loss after surgery he may have deflation and laxity of soft tissue and skin resulting in a suboptimal cosmesis.\par -He understands that he will be wearing a post-operative chest compression garment for an extended period, and he may have post-op drains.\par -In my opinion, pt is poor candidate for suction lipectomy alone to treat gynecomastia.\par -I reviewed risk of keloid scarring\par -All questions were answered.  Pt agreed with the recommended surgery.  Informed consent was obtained.\par -Will schedule for BLAIRE outpatient procedure under [facility]\par \par Photos were taken with patient permission at last visit\par \par Due to COVID-19, pre-visit patient instructions were explained to the patient and their symptoms were checked upon arrival. Masks were used by the healthcare provider and staff and the examination room was cleaned after the patient visit concluded\par

## 2023-02-08 NOTE — PHYSICAL EXAM
[de-identified] : well developed pleasant male, NAD [de-identified] : NC/AT [de-identified] : supple [de-identified] : unlabored breathing,  [de-identified] : JOSE ER [de-identified] : no trunk deformities [de-identified] : Neck: no cervical spine point tenderness; bilateral shoulder grooving present\par \par Left breast: no palpable masses, nipple retraction or discharge.\par Right breast: no palpable masses, nipple retraction or discharge.\par Minimal bilateral axillary rolls\par \par Bilateral gynecomastia (L>R) with Grade II/III with grade 1 ptosis; no active inframammary fold rash, additional skin laxity after weight loss\par Anticipated volume of resection of EACH breast based on BSA: 400-500 g [de-identified] : soft, nontender

## 2023-02-08 NOTE — HISTORY OF PRESENT ILLNESS
[FreeTextEntry1] : 51 yo M with PMHx of HTN, Type II DM last HgA1c 9, , GERD who presents today for evaluation of gynecomastia. Patient endorses having enlarged chest since puberty at 11 with gradual increase in size now c/o asymmetry L>R and left breast discomfort. Patient denies any steroid use but has been taking a lot of Zantc for GERD. \par No change in chest size with weight loss in the past. \par No prior endocrine workup. \par \par Occupation - on disability for [employer] \par Social hx - does not smoke cigarettes, no drug use \par Denies any h/o DVT/PE or MRSA infections. \par \par PMD Dr. Mcgill \par \par Interval Hx (8/15/22):  Pt reports no new changes in health, L side has remained more tender than R. Believes his sugars have been betters lately has he has made some changes in his diet. Lab results and mammogram reviewed with pt.  Cr high on labs would recommend follow up with PCP. Mammogram reviewed with minimal retroareolar breast tissue\par \par Inteval hx (11/21/22):  Here for pre-op weight loss check and possible surgical planning.  pt lost 25 lbs, he now 217 lbs.  Pt would like to lose an additional 15 lbs.   His HgA1c 4.6. \par \par Interval hx (1/25/23):  here for weight loss check before BL gynecomastia surgery.  he has lost 5 lbs.  he here to confirm surgical treatment.\par \par Interval hx (2/8/23):  here to discuss pre-op questions.

## 2023-02-23 ENCOUNTER — OUTPATIENT (OUTPATIENT)
Dept: OUTPATIENT SERVICES | Facility: HOSPITAL | Age: 53
LOS: 1 days | End: 2023-02-23
Payer: MEDICARE

## 2023-02-23 VITALS
SYSTOLIC BLOOD PRESSURE: 147 MMHG | HEART RATE: 76 BPM | WEIGHT: 210.1 LBS | DIASTOLIC BLOOD PRESSURE: 86 MMHG | HEIGHT: 77 IN | RESPIRATION RATE: 18 BRPM | TEMPERATURE: 96 F | OXYGEN SATURATION: 98 %

## 2023-02-23 DIAGNOSIS — Z96.0 PRESENCE OF UROGENITAL IMPLANTS: Chronic | ICD-10-CM

## 2023-02-23 DIAGNOSIS — Z01.818 ENCOUNTER FOR OTHER PREPROCEDURAL EXAMINATION: ICD-10-CM

## 2023-02-23 DIAGNOSIS — Z98.890 OTHER SPECIFIED POSTPROCEDURAL STATES: Chronic | ICD-10-CM

## 2023-02-23 DIAGNOSIS — N62 HYPERTROPHY OF BREAST: ICD-10-CM

## 2023-02-23 LAB
A1C WITH ESTIMATED AVERAGE GLUCOSE RESULT: 4.3 % — SIGNIFICANT CHANGE UP (ref 4–5.6)
ALBUMIN SERPL ELPH-MCNC: 4.6 G/DL — SIGNIFICANT CHANGE UP (ref 3.5–5.2)
ALP SERPL-CCNC: 150 U/L — HIGH (ref 30–115)
ALT FLD-CCNC: 16 U/L — SIGNIFICANT CHANGE UP (ref 0–41)
ANION GAP SERPL CALC-SCNC: 7 MMOL/L — SIGNIFICANT CHANGE UP (ref 7–14)
APTT BLD: 34.8 SEC — SIGNIFICANT CHANGE UP (ref 27–39.2)
AST SERPL-CCNC: 25 U/L — SIGNIFICANT CHANGE UP (ref 0–41)
BASOPHILS # BLD AUTO: 0.02 K/UL — SIGNIFICANT CHANGE UP (ref 0–0.2)
BASOPHILS NFR BLD AUTO: 0.5 % — SIGNIFICANT CHANGE UP (ref 0–1)
BILIRUB SERPL-MCNC: 0.4 MG/DL — SIGNIFICANT CHANGE UP (ref 0.2–1.2)
BUN SERPL-MCNC: 24 MG/DL — HIGH (ref 10–20)
CALCIUM SERPL-MCNC: 9.7 MG/DL — SIGNIFICANT CHANGE UP (ref 8.4–10.5)
CHLORIDE SERPL-SCNC: 104 MMOL/L — SIGNIFICANT CHANGE UP (ref 98–110)
CO2 SERPL-SCNC: 27 MMOL/L — SIGNIFICANT CHANGE UP (ref 17–32)
CREAT SERPL-MCNC: 1.7 MG/DL — HIGH (ref 0.7–1.5)
EGFR: 48 ML/MIN/1.73M2 — LOW
EOSINOPHIL # BLD AUTO: 0.04 K/UL — SIGNIFICANT CHANGE UP (ref 0–0.7)
EOSINOPHIL NFR BLD AUTO: 1.1 % — SIGNIFICANT CHANGE UP (ref 0–8)
ESTIMATED AVERAGE GLUCOSE: 77 MG/DL — SIGNIFICANT CHANGE UP (ref 68–114)
GLUCOSE SERPL-MCNC: 78 MG/DL — SIGNIFICANT CHANGE UP (ref 70–99)
HCT VFR BLD CALC: 39.7 % — LOW (ref 42–52)
HGB BLD-MCNC: 12.4 G/DL — LOW (ref 14–18)
IMM GRANULOCYTES NFR BLD AUTO: 0.3 % — SIGNIFICANT CHANGE UP (ref 0.1–0.3)
INR BLD: 1.06 RATIO — SIGNIFICANT CHANGE UP (ref 0.65–1.3)
LYMPHOCYTES # BLD AUTO: 1.48 K/UL — SIGNIFICANT CHANGE UP (ref 1.2–3.4)
LYMPHOCYTES # BLD AUTO: 40.7 % — SIGNIFICANT CHANGE UP (ref 20.5–51.1)
MCHC RBC-ENTMCNC: 23.8 PG — LOW (ref 27–31)
MCHC RBC-ENTMCNC: 31.2 G/DL — LOW (ref 32–37)
MCV RBC AUTO: 76.3 FL — LOW (ref 80–94)
MONOCYTES # BLD AUTO: 0.35 K/UL — SIGNIFICANT CHANGE UP (ref 0.1–0.6)
MONOCYTES NFR BLD AUTO: 9.6 % — HIGH (ref 1.7–9.3)
NEUTROPHILS # BLD AUTO: 1.74 K/UL — SIGNIFICANT CHANGE UP (ref 1.4–6.5)
NEUTROPHILS NFR BLD AUTO: 47.8 % — SIGNIFICANT CHANGE UP (ref 42.2–75.2)
NRBC # BLD: 0 /100 WBCS — SIGNIFICANT CHANGE UP (ref 0–0)
PLATELET # BLD AUTO: 218 K/UL — SIGNIFICANT CHANGE UP (ref 130–400)
POTASSIUM SERPL-MCNC: 5.2 MMOL/L — HIGH (ref 3.5–5)
POTASSIUM SERPL-SCNC: 5.2 MMOL/L — HIGH (ref 3.5–5)
PROT SERPL-MCNC: 7.1 G/DL — SIGNIFICANT CHANGE UP (ref 6–8)
PROTHROM AB SERPL-ACNC: 12.1 SEC — SIGNIFICANT CHANGE UP (ref 9.95–12.87)
RBC # BLD: 5.2 M/UL — SIGNIFICANT CHANGE UP (ref 4.7–6.1)
RBC # FLD: 13.4 % — SIGNIFICANT CHANGE UP (ref 11.5–14.5)
SODIUM SERPL-SCNC: 138 MMOL/L — SIGNIFICANT CHANGE UP (ref 135–146)
WBC # BLD: 3.64 K/UL — LOW (ref 4.8–10.8)
WBC # FLD AUTO: 3.64 K/UL — LOW (ref 4.8–10.8)

## 2023-02-23 PROCEDURE — 85730 THROMBOPLASTIN TIME PARTIAL: CPT

## 2023-02-23 PROCEDURE — 80053 COMPREHEN METABOLIC PANEL: CPT

## 2023-02-23 PROCEDURE — 85025 COMPLETE CBC W/AUTO DIFF WBC: CPT

## 2023-02-23 PROCEDURE — 93010 ELECTROCARDIOGRAM REPORT: CPT

## 2023-02-23 PROCEDURE — 83036 HEMOGLOBIN GLYCOSYLATED A1C: CPT

## 2023-02-23 PROCEDURE — 93005 ELECTROCARDIOGRAM TRACING: CPT

## 2023-02-23 PROCEDURE — 85610 PROTHROMBIN TIME: CPT

## 2023-02-23 PROCEDURE — 99214 OFFICE O/P EST MOD 30 MIN: CPT | Mod: 25

## 2023-02-23 PROCEDURE — 36415 COLL VENOUS BLD VENIPUNCTURE: CPT

## 2023-02-23 RX ORDER — METFORMIN HYDROCHLORIDE 850 MG/1
1 TABLET ORAL
Qty: 0 | Refills: 0 | DISCHARGE

## 2023-02-23 NOTE — H&P PST ADULT - NSICDXPASTSURGICALHX_GEN_ALL_CORE_FT
PAST SURGICAL HISTORY:  H/O foot surgery     History of eye surgery     History of penile implant

## 2023-02-23 NOTE — H&P PST ADULT - HISTORY OF PRESENT ILLNESS
CURRENTLY  DENIES ANY CP, SOB, PALPITATIONS, COUGH OR DYSURIA  EXERCISE TOLERANCE 2 FOS WITHOUT SOB    AS PER PATIENT  this is his/her complete medical history including medications - PRESCRIPTIONS  OVER THE COUNTER MEDS    pt denies any covid s/s, 2022 tested positive in the past.  Received covid vaccine X 2  pt advised self quarantine till day of procedure    Anesthesia Alert  NO--Difficult Airway  NO--History of neck surgery or radiation  NO--Limited ROM of neck  NO--History of Malignant hyperthermia  NO--No personal or family history of Pseudocholinesterase deficiency.  NO--Prior Anesthesia Complication  NO--Latex Allergy  NO--Loose teeth  NO--History of Rheumatoid Arthritis  NO--MOLLY  NO--Bleeding risk  NO--Other_____    Patient verbalized understanding of instructions and was given the opportunity to ask questions and have them answered.

## 2023-02-23 NOTE — H&P PST ADULT - REASON FOR ADMISSION
53 Y/O M SCHEDULED FOR PAST FOR  BILATERAL KEYHOLE GYNECOMASTIA EXCISION WITH DERMAL NIPPLE AREOLA COMPLEX PEDICLE UNDER [place] WITH DR TRINIDAD ON 3/16/23.

## 2023-02-24 DIAGNOSIS — Z01.818 ENCOUNTER FOR OTHER PREPROCEDURAL EXAMINATION: ICD-10-CM

## 2023-02-24 DIAGNOSIS — N62 HYPERTROPHY OF BREAST: ICD-10-CM

## 2023-03-13 ENCOUNTER — LABORATORY RESULT (OUTPATIENT)
Age: 53
End: 2023-03-13

## 2023-03-15 NOTE — ASU PATIENT PROFILE, ADULT - ANESTHESIA, PREVIOUS REACTION, PROFILE
Before Your Surgery       Call your surgeon if there is any change in your health. This includes signs of a cold or flu (such as a sore throat, runny nose, cough, rash or fever).       Do not smoke, drink alcohol or take over the counter medicine (unless your surgeon or primary care doctor tells you to) for the 24 hours before and after surgery.       If you take prescribed drugs: Follow your doctor s orders about which medicines to take and which to stop until after surgery.      Eating and drinking prior to surgery: follow the instructions from your surgeon.      Take a shower or bath the night before surgery. Use the soap your surgeon gave you to gently clean your skin. If you do not have soap from your surgeon, use your regular soap. Do not shave or scrub the surgery site. Wear clean pajamas and have clean sheets on your bed.             Follow your surgeon's direction on when to stop eating and drinking prior to surgery.    Your surgeon will be managing your pain after your surgery.    
none

## 2023-03-15 NOTE — ASU PATIENT PROFILE, ADULT - FALL HARM RISK - HARM RISK INTERVENTIONS
Communicate Risk of Fall with Harm to all staff/Reinforce activity limits and safety measures with patient and family/Tailored Fall Risk Interventions/Visual Cue: Yellow wristband and red socks/Bed in lowest position, wheels locked, appropriate side rails in place/Call bell, personal items and telephone in reach/Instruct patient to call for assistance before getting out of bed or chair/Non-slip footwear when patient is out of bed/[place] to call system/Physically safe environment - no spills, clutter or unnecessary equipment/Purposeful Proactive Rounding/Room/bathroom lighting operational, light cord in reach Assistance with ambulation/Assistance OOB with selected safe patient handling equipment/Communicate Risk of Fall with Harm to all staff/Discuss with provider need for PT consult/Monitor gait and stability/Provide patient with walking aids - walker, cane, crutches/Reinforce activity limits and safety measures with patient and family/Tailored Fall Risk Interventions/Visual Cue: Yellow wristband and red socks/Bed in lowest position, wheels locked, appropriate side rails in place/Call bell, personal items and telephone in reach/Instruct patient to call for assistance before getting out of bed or chair/Non-slip footwear when patient is out of bed/[place] to call system/Physically safe environment - no spills, clutter or unnecessary equipment/Purposeful Proactive Rounding/Room/bathroom lighting operational, light cord in reach

## 2023-03-16 ENCOUNTER — TRANSCRIPTION ENCOUNTER (OUTPATIENT)
Age: 53
End: 2023-03-16

## 2023-03-16 ENCOUNTER — RESULT REVIEW (OUTPATIENT)
Age: 53
End: 2023-03-16

## 2023-03-16 ENCOUNTER — OUTPATIENT (OUTPATIENT)
Dept: INPATIENT UNIT | Facility: HOSPITAL | Age: 53
LOS: 1 days | Discharge: HOME | End: 2023-03-16
Payer: MEDICARE

## 2023-03-16 ENCOUNTER — APPOINTMENT (OUTPATIENT)
Dept: PLASTIC SURGERY | Facility: AMBULATORY SURGERY CENTER | Age: 53
End: 2023-03-16
Payer: MEDICAID

## 2023-03-16 VITALS
HEIGHT: 77 IN | DIASTOLIC BLOOD PRESSURE: 93 MMHG | RESPIRATION RATE: 24 BRPM | SYSTOLIC BLOOD PRESSURE: 166 MMHG | TEMPERATURE: 99 F | OXYGEN SATURATION: 99 % | HEART RATE: 98 BPM | WEIGHT: 210.1 LBS

## 2023-03-16 VITALS
HEART RATE: 73 BPM | OXYGEN SATURATION: 99 % | RESPIRATION RATE: 18 BRPM | SYSTOLIC BLOOD PRESSURE: 132 MMHG | DIASTOLIC BLOOD PRESSURE: 79 MMHG

## 2023-03-16 DIAGNOSIS — Z96.0 PRESENCE OF UROGENITAL IMPLANTS: Chronic | ICD-10-CM

## 2023-03-16 DIAGNOSIS — N62 HYPERTROPHY OF BREAST: ICD-10-CM

## 2023-03-16 DIAGNOSIS — Z98.890 OTHER SPECIFIED POSTPROCEDURAL STATES: Chronic | ICD-10-CM

## 2023-03-16 DIAGNOSIS — I10 ESSENTIAL (PRIMARY) HYPERTENSION: ICD-10-CM

## 2023-03-16 DIAGNOSIS — E11.9 TYPE 2 DIABETES MELLITUS WITHOUT COMPLICATIONS: ICD-10-CM

## 2023-03-16 LAB — GLUCOSE BLDC GLUCOMTR-MCNC: 117 MG/DL — HIGH (ref 70–99)

## 2023-03-16 PROCEDURE — 19300 MASTECTOMY FOR GYNECOMASTIA: CPT | Mod: 50

## 2023-03-16 PROCEDURE — 19350 NIPPLE/AREOLA RECONSTRUCTION: CPT | Mod: 50

## 2023-03-16 PROCEDURE — 88305 TISSUE EXAM BY PATHOLOGIST: CPT

## 2023-03-16 PROCEDURE — 82962 GLUCOSE BLOOD TEST: CPT

## 2023-03-16 PROCEDURE — 88305 TISSUE EXAM BY PATHOLOGIST: CPT | Mod: 26

## 2023-03-16 RX ORDER — ACETAMINOPHEN 500 MG
2 TABLET ORAL
Qty: 0 | Refills: 0 | DISCHARGE

## 2023-03-16 RX ORDER — DULAGLUTIDE 4.5 MG/.5ML
0 INJECTION, SOLUTION SUBCUTANEOUS
Qty: 0 | Refills: 0 | DISCHARGE

## 2023-03-16 RX ORDER — ONDANSETRON 8 MG/1
4 TABLET, FILM COATED ORAL ONCE
Refills: 0 | Status: DISCONTINUED | OUTPATIENT
Start: 2023-03-16 | End: 2023-03-16

## 2023-03-16 RX ORDER — HEPARIN SODIUM 5000 [USP'U]/ML
5000 INJECTION INTRAVENOUS; SUBCUTANEOUS ONCE
Refills: 0 | Status: COMPLETED | OUTPATIENT
Start: 2023-03-16 | End: 2023-03-16

## 2023-03-16 RX ORDER — ONDANSETRON 8 MG/1
1 TABLET, FILM COATED ORAL
Qty: 12 | Refills: 0
Start: 2023-03-16 | End: 2023-03-17

## 2023-03-16 RX ORDER — TRAMADOL HYDROCHLORIDE 50 MG/1
1 TABLET ORAL
Qty: 10 | Refills: 0
Start: 2023-03-16

## 2023-03-16 RX ORDER — HYDROMORPHONE HYDROCHLORIDE 2 MG/ML
0.5 INJECTION INTRAMUSCULAR; INTRAVENOUS; SUBCUTANEOUS
Refills: 0 | Status: DISCONTINUED | OUTPATIENT
Start: 2023-03-16 | End: 2023-03-16

## 2023-03-16 RX ORDER — SODIUM CHLORIDE 9 MG/ML
1000 INJECTION, SOLUTION INTRAVENOUS
Refills: 0 | Status: DISCONTINUED | OUTPATIENT
Start: 2023-03-16 | End: 2023-03-16

## 2023-03-16 RX ORDER — CEPHALEXIN 500 MG
1 CAPSULE ORAL
Qty: 14 | Refills: 0
Start: 2023-03-16 | End: 2023-03-22

## 2023-03-16 RX ORDER — METFORMIN HYDROCHLORIDE 850 MG/1
1 TABLET ORAL
Qty: 0 | Refills: 0 | DISCHARGE

## 2023-03-16 RX ADMIN — HYDROMORPHONE HYDROCHLORIDE 0.5 MILLIGRAM(S): 2 INJECTION INTRAMUSCULAR; INTRAVENOUS; SUBCUTANEOUS at 15:16

## 2023-03-16 RX ADMIN — HYDROMORPHONE HYDROCHLORIDE 0.5 MILLIGRAM(S): 2 INJECTION INTRAMUSCULAR; INTRAVENOUS; SUBCUTANEOUS at 15:30

## 2023-03-16 RX ADMIN — HEPARIN SODIUM 5000 UNIT(S): 5000 INJECTION INTRAVENOUS; SUBCUTANEOUS at 10:03

## 2023-03-16 RX ADMIN — SODIUM CHLORIDE 100 MILLILITER(S): 9 INJECTION, SOLUTION INTRAVENOUS at 15:18

## 2023-03-16 NOTE — ASU DISCHARGE PLAN (ADULT/PEDIATRIC) - NS MD DC FALL RISK RISK
For information on Fall & Injury Prevention, visit: [email or web address] OR  [email or web address] OR  https://www.cdc.gov/steadi/patient.html

## 2023-03-16 NOTE — ASU DISCHARGE PLAN (ADULT/PEDIATRIC) - ASU DC SPECIAL INSTRUCTIONSFT
Resume normal diet. Avoid straining, exercise, or heavy lifting. Do not lift arms above your held.    Pain: Take acetaminophen around the clock for pain control. Take any additional prescriptions as directed.    Showering: Do not shower. Sponge bathe only.    Dressings: Leave all dressings in place. Wear your binder at all times.    You will be discharged with JAVI drains. You will need to empty them and record outputs accurately. This will be taught to you by the nursing staff. Please do not remove the JAVI drains. They will be removed in the office. Please bring to the office accurate records of output.     Follow up: Dr. Pandya's office with Dr. Pandya or her PA approximately 1 week after discharge, you may call the office to confirm your appointment.

## 2023-03-16 NOTE — CHART NOTE - NSCHARTNOTEFT_GEN_A_CORE
PACU ANESTHESIA ADMISSION NOTE      Procedure: Excision, gynecomastia      Post op diagnosis:  Gynecomastia    ____  Intubated  TV:______       Rate: ______      FiO2: ______    __x__  Patent Airway    __x__  Full return of protective reflexes    __x__  Full recovery from anesthesia / back to baseline     Vitals:   T:    98       R:    15              BP:  131/75                Sat:    98               P: 72      Mental Status:  __x__ Awake   _____ Alert   _____ Drowsy   _____ Sedated    Nausea/Vomiting:  _x__ NO  ______Yes,   See Post - Op Orders          Pain Scale (0-10):  _____    Treatment: ____ None    _x___ See Post - Op/PCA Orders    Post - Operative Fluids:   ____ Oral   ____x  See Post - Op Orders    Plan: Discharge:   ___ x _Home       _____Floor     _____Critical Care    _____  Other:_________________    Comments: Patient tolerated procedure well

## 2023-03-16 NOTE — ASU DISCHARGE PLAN (ADULT/PEDIATRIC) - CARE PROVIDER_API CALL
Morelia Pandya)  Plastic Surgery  [address] [place] [address], [address]  [place] [address]  Phone: [phone]  Fax: [phone]  Follow Up Time:

## 2023-03-17 ENCOUNTER — NON-APPOINTMENT (OUTPATIENT)
Age: 53
End: 2023-03-17

## 2023-03-20 ENCOUNTER — APPOINTMENT (OUTPATIENT)
Dept: PLASTIC SURGERY | Facility: CLINIC | Age: 53
End: 2023-03-20
Payer: MEDICARE

## 2023-03-20 PROCEDURE — 99024 POSTOP FOLLOW-UP VISIT: CPT

## 2023-03-20 NOTE — ASSESSMENT
[FreeTextEntry1] : 53 yo M with PMHx of DM with Grade 3 gynecomastia of bilateral breasts  L>R. Would like to lose additional weight and get closer to 210lbs (217 lbs today in the office)\par \par Now POD#4 s/p double-incision keyhole gynecomastia excision with dermal pedicle (NAC). Doing well. \par \par - dressings changed\par - continue all prescribed medications \par - continue drain care\par - continue chest compression\par - sleep on the back in reclined position\par - post-op instructions reviewed and all his questions were answered\par - f/u Thu as previously scheduled for drain removal\par \par Due to COVID-19, pre-visit patient instructions were explained to the patient and their symptoms were checked upon arrival. Masks were used by the healthcare provider and staff and the examination room was cleaned after the patient visit concluded\par

## 2023-03-20 NOTE — HISTORY OF PRESENT ILLNESS
[FreeTextEntry1] : 53 yo M with PMHx of HTN, Type II DM last HgA1c 9, , GERD who presents today for evaluation of gynecomastia. Patient endorses having enlarged chest since puberty at 11 with gradual increase in size now c/o asymmetry L>R and left breast discomfort. Patient denies any steroid use but has been taking a lot of Zantc for GERD. \par No change in chest size with weight loss in the past. \par No prior endocrine workup. \par \par Occupation - on disability for [employer] \par Social hx - does not smoke cigarettes, no drug use \par Denies any h/o DVT/PE or MRSA infections. \par \par PMD Dr. Mcgill \par \par Interval Hx (8/15/22):  Pt reports no new changes in health, L side has remained more tender than R. Believes his sugars have been betters lately has he has made some changes in his diet. Lab results and mammogram reviewed with pt.  Cr high on labs would recommend follow up with PCP. Mammogram reviewed with minimal retroareolar breast tissue\par \par Inteval hx (11/21/22):  Here for pre-op weight loss check and possible surgical planning.  pt lost 25 lbs, he now 217 lbs.  Pt would like to lose an additional 15 lbs.   His HgA1c 4.6. \par \par Interval hx (1/25/23):  here for weight loss check before BL gynecomastia surgery.  he has lost 5 lbs.  he here to confirm surgical treatment.\par \par Interval hx (2/8/23):  here to discuss pre-op questions.  \par \par Interval hx (3/20/23). Pt here POD#4 s/p bilateral gynecomastia excision with dermal pedicle, keyhole skin excision for dressing change. Doing well. Reporting on significant pain, f/c or drainage. Drains functional, pt did not bring drain log with him. Compliant with chest compression. \par

## 2023-03-20 NOTE — PHYSICAL EXAM
[de-identified] : well developed pleasant male, NAD [de-identified] : unlabored breathing,  [de-identified] : JOSE ER [de-identified] : Bilateral incisions healing well, c/d/i, no erythema or fluid collection, scattered bruising and swelling as expected, nipples viable, excellent contour, drains functional with ss output

## 2023-03-23 ENCOUNTER — APPOINTMENT (OUTPATIENT)
Dept: PLASTIC SURGERY | Facility: CLINIC | Age: 53
End: 2023-03-23
Payer: MEDICARE

## 2023-03-23 PROCEDURE — 99024 POSTOP FOLLOW-UP VISIT: CPT

## 2023-03-23 NOTE — PHYSICAL EXAM
[de-identified] : well developed pleasant male, NAD [de-identified] : unlabored breathing,  [de-identified] : JOSE ER [de-identified] : Bilateral incisions healing well, c/d/i, no erythema or fluid collection, scattered bruising and swelling as expected, nipples viable, Rt nipple with diminished sensation, excellent contour, drains functional with ss output R>L

## 2023-03-23 NOTE — HISTORY OF PRESENT ILLNESS
[FreeTextEntry1] : 51 yo M with PMHx of HTN, Type II DM last HgA1c 9, , GERD who presents today for evaluation of gynecomastia. Patient endorses having enlarged chest since puberty at 11 with gradual increase in size now c/o asymmetry L>R and left breast discomfort. Patient denies any steroid use but has been taking a lot of Zantc for GERD. \par No change in chest size with weight loss in the past. \par No prior endocrine workup. \par \par Occupation - on disability for [employer] \par Social hx - does not smoke cigarettes, no drug use \par Denies any h/o DVT/PE or MRSA infections. \par \par PMD Dr. Mcgill \par \par Interval Hx (8/15/22):  Pt reports no new changes in health, L side has remained more tender than R. Believes his sugars have been betters lately has he has made some changes in his diet. Lab results and mammogram reviewed with pt.  Cr high on labs would recommend follow up with PCP. Mammogram reviewed with minimal retroareolar breast tissue\par \par Inteval hx (11/21/22):  Here for pre-op weight loss check and possible surgical planning.  pt lost 25 lbs, he now 217 lbs.  Pt would like to lose an additional 15 lbs.   His HgA1c 4.6. \par \par Interval hx (1/25/23):  here for weight loss check before BL gynecomastia surgery.  he has lost 5 lbs.  he here to confirm surgical treatment.\par \par Interval hx (2/8/23):  here to discuss pre-op questions.  \par \par Interval hx (3/20/23). Pt here POD#4 s/p bilateral gynecomastia excision with dermal pedicle, keyhole skin excision for dressing change. Doing well. Reporting on significant pain, f/c or drainage. Drains functional, pt did not bring drain log with him. Compliant with chest compression. \par \par Interval hx (3/23/23). Pt here POD#7 s/p bilateral gynecomastia excision with dermal pedicle, keyhole skin excision. Doing well. Denies any pain, f/c or bleeding. Completed all prescribed medications. Wearing compression vest. Drains Rt 30/30, Lt 10/5\par

## 2023-03-23 NOTE — ASSESSMENT
[FreeTextEntry1] : 53 yo M with PMHx of DM with Grade 3 gynecomastia of bilateral breasts  L>R. Would like to lose additional weight and get closer to 210lbs (217 lbs today in the office)\par \par Now POD#7 s/p double-incision keyhole gynecomastia excision with dermal pedicle (NAC). Doing well. \par \par - dressings changed\par - left drain removed, continue right drain care \par - continue chest compression\par - awaiting pathology \par - sleep on the back in reclined position\par - post-op instructions reviewed and all his questions were answered\par - f/u next week for drain removal and scar management \par \par Due to COVID-19, pre-visit patient instructions were explained to the patient and their symptoms were checked upon arrival. Masks were used by the healthcare provider and staff and the examination room was cleaned after the patient visit concluded\par

## 2023-03-27 ENCOUNTER — APPOINTMENT (OUTPATIENT)
Dept: PLASTIC SURGERY | Facility: CLINIC | Age: 53
End: 2023-03-27
Payer: MEDICARE

## 2023-03-27 LAB — SURGICAL PATHOLOGY STUDY: SIGNIFICANT CHANGE UP

## 2023-03-27 PROCEDURE — 99024 POSTOP FOLLOW-UP VISIT: CPT

## 2023-03-27 NOTE — HISTORY OF PRESENT ILLNESS
[FreeTextEntry1] : 51 yo M with PMHx of HTN, Type II DM last HgA1c 9, , GERD who presents today for evaluation of gynecomastia. Patient endorses having enlarged chest since puberty at 11 with gradual increase in size now c/o asymmetry L>R and left breast discomfort. Patient denies any steroid use but has been taking a lot of Zantc for GERD. \par No change in chest size with weight loss in the past. \par No prior endocrine workup. \par \par Occupation - on disability for RLE \par Social hx - does not smoke cigarettes, no drug use \par Denies any h/o DVT/PE or MRSA infections. \par \par PMD Dr. Mcgill \par \par Interval Hx (8/15/22):  Pt reports no new changes in health, L side has remained more tender than R. Believes his sugars have been betters lately has he has made some changes in his diet. Lab results and mammogram reviewed with pt.  Cr high on labs would recommend follow up with PCP. Mammogram reviewed with minimal retroareolar breast tissue\par \par Inteval hx (11/21/22):  Here for pre-op weight loss check and possible surgical planning.  pt lost 25 lbs, he now 217 lbs.  Pt would like to lose an additional 15 lbs.   His HgA1c 4.6. \par \par Interval hx (1/25/23):  here for weight loss check before BL gynecomastia surgery.  he has lost 5 lbs.  he here to confirm surgical treatment.\par \par Interval hx (2/8/23):  here to discuss pre-op questions.  \par \par Interval hx (3/20/23). Pt here POD#4 s/p bilateral gynecomastia excision with dermal pedicle, keyhole skin excision for dressing change. Doing well. Reporting on significant pain, f/c or drainage. Drains functional, pt did not bring drain log with him. Compliant with chest compression. \par \par Interval hx (3/23/23). Pt here POD#7 s/p bilateral gynecomastia excision with dermal pedicle, keyhole skin excision. Doing well. Denies any pain, f/c or bleeding. Completed all prescribed medications. Wearing compression vest. Drains Rt 30/30, Lt 10/5\par \par Interval hx (3/27/23): Pt here POD#11 s/p bilateral gynecomastia excision with dermal pedicle, keyhole skin excision. Doing well. Denies any pain, f/c or bleeding. Completed all prescribed medications. Wearing compression vest. Drains Rt 20. Compliant with exercise limitations thus far, wants to go back to gym asap. \par

## 2023-03-27 NOTE — ASSESSMENT
[FreeTextEntry1] : 51 yo M with PMHx of DM with Grade 3 gynecomastia of bilateral breasts  L>R. Would like to lose additional weight and get closer to 210lbs (217 lbs today in the office)\par \par Now POD#11 s/p double-incision keyhole gynecomastia excision with dermal pedicle (NAC). Doing well. \par \par - D/c drain. Drain site: baci BID until closed \par - Scar management: Aquaphor x 1 week then ok to switch to silicone based scar cream (scar guard, silicone strips, ok to use both if desires). \par - Daily SPF\par - signs and symptoms of infection reviewed\par - No heavy lifting/pushing/pulling, importance emphasized\par - Tight glycemic control\par - Continue compression vest 24/7\par - All post op instructions reviewed, all questions answered\par - f/u 1 month for scar check\par \par

## 2023-03-27 NOTE — PHYSICAL EXAM
[de-identified] : well developed pleasant male, NAD [de-identified] : unlabored breathing [de-identified] : JOSE ER [de-identified] : Bilateral incisions healing well, c/d/i, no erythema or fluid collection, scattered bruising and swelling as expected, nipples viable, Rt nipple with diminished sensation, excellent contour, R drain functional with ss output

## 2023-04-17 ENCOUNTER — APPOINTMENT (OUTPATIENT)
Dept: PLASTIC SURGERY | Facility: CLINIC | Age: 53
End: 2023-04-17
Payer: MEDICARE

## 2023-04-17 DIAGNOSIS — N62 HYPERTROPHY OF BREAST: ICD-10-CM

## 2023-04-17 PROCEDURE — 99024 POSTOP FOLLOW-UP VISIT: CPT

## 2023-04-23 NOTE — PHYSICAL EXAM
[de-identified] : unlabored breathing [de-identified] : well developed pleasant male, NAD [de-identified] : JOSE ER [de-identified] : Bilateral incisions healing well, c/d/i, no erythema or fluid collection, scattered bruising resolved, swelling as expected, improving, nipples viable, Rt nipple with diminished sensation, excellent contour

## 2023-04-23 NOTE — DATA REVIEWED
[FreeTextEntry1] :  Pathology             Final\par \par No Documents Attached\par \par \par   [facility] Accession Number : [id number]\par Patient:   MAUREEN CASTAÑEDA\par \par \par Accession:                             [id number]\par \par Collected Date/Time:                   3/16/2023 13:30 EDT\par Received Date/Time:                    3/16/2023 15:14 EDT\par \par Surgical Pathology Report - Auth (Verified)\par \par Specimen(s) Submitted\par 1  Right breast tissue\par Time obtained: 1:30 pm\par Cold ischemic time <1 hour\par 2  Left breast tissue\par Time obtained: 1:30 pm\par Cold ischemic time <1 hour\par \par Final Diagnosis\par \par 1.2.  Right and left breast tissue:\par -  Gynecomastia.\par Verified by: Lisa Hopkins M.D.\par (Electronic Signature)\par Reported on: 03/27/23 14:58 EDT, [facility],\par [address], [place] [address]\par Phone: [phone]   Fax: [phone]\par _________________________________________________________________\par \par \par Clinical Information\par Bilateral gynecomastia excision\par \par Perioperative Diagnosis\par Gynecomastia, symptomatic\par \par Gross Description\par 1. Specimen received fresh labeled "right breast tissue", consist\par of a yellow-tan fibroadipose mass along with multiple fragments of\par fibroadipose tissue weighing 139 g. The larger fibroadipose measures\par 10 x 9 x 5 cm.  Smaller tissue measures 6 x 6 x 1.5 cm in aggregate. On\par sectioning, cut surface is yellow soft with focal white fibrous area. The\par outer surface is inked black.  Representative sections are submitted.\par (10 blocks)\par \par 2. Specimen received fresh labeled "left breast tissue", consists of a\par yellow-tan fibroadipose mass weighing 183 g and measuring 11.5 x 10 x\par 3 cm.  The outer surface is inked black. On sectioning, cut surface is\par yellow soft with focal white fibrous area. Representative sections are\par submitted.\par (11 blocks)\par \par Specimen was received and underwent gross examination at [facility], [address] [place] [address].\par \par \par 03/17/2023 18:32:23 EDT NK\par \par  \par \par  Ordered by: ITZ TRINIDAD       Collected/Examined: 16Mar2023 01:30PM       \par Verification Required       Stage: Final       \par  Performed at: [facility]       Resulted: 27Mar2023 02:58PM       Last Updated: 27Mar2023 02:58PM       Accession: [id number]

## 2023-04-23 NOTE — HISTORY OF PRESENT ILLNESS
[FreeTextEntry1] : 53 yo M with PMHx of HTN, Type II DM last HgA1c 9, , GERD who presents today for evaluation of gynecomastia. Patient endorses having enlarged chest since puberty at 11 with gradual increase in size now c/o asymmetry L>R and left breast discomfort. Patient denies any steroid use but has been taking a lot of Zantc for GERD. \par No change in chest size with weight loss in the past. \par No prior endocrine workup. \par \par Occupation - on disability for RLE \par Social hx - does not smoke cigarettes, no drug use \par Denies any h/o DVT/PE or MRSA infections. \par \par PMD Dr. Mcgill \par \par Interval Hx (8/15/22):  Pt reports no new changes in health, L side has remained more tender than R. Believes his sugars have been betters lately has he has made some changes in his diet. Lab results and mammogram reviewed with pt.  Cr high on labs would recommend follow up with PCP. Mammogram reviewed with minimal retroareolar breast tissue\par \par Inteval hx (11/21/22):  Here for pre-op weight loss check and possible surgical planning.  pt lost 25 lbs, he now 217 lbs.  Pt would like to lose an additional 15 lbs.   His HgA1c 4.6. \par \par Interval hx (1/25/23):  here for weight loss check before BL gynecomastia surgery.  he has lost 5 lbs.  he here to confirm surgical treatment.\par \par Interval hx (2/8/23):  here to discuss pre-op questions.  \par \par Interval hx (3/20/23). Pt here POD#4 s/p bilateral gynecomastia excision with dermal pedicle, keyhole skin excision for dressing change. Doing well. Reporting on significant pain, f/c or drainage. Drains functional, pt did not bring drain log with him. Compliant with chest compression. \par \par Interval hx (3/23/23). Pt here POD#7 s/p bilateral gynecomastia excision with dermal pedicle, keyhole skin excision. Doing well. Denies any pain, f/c or bleeding. Completed all prescribed medications. Wearing compression vest. Drains Rt 30/30, Lt 10/5\par \par Interval hx (3/27/23): Pt here POD#11 s/p bilateral gynecomastia excision with dermal pedicle, keyhole skin excision. Doing well. Denies any pain, f/c or bleeding. Completed all prescribed medications. Wearing compression vest. Drains Rt 20. Compliant with exercise limitations thus far, wants to go back to gym asap. \par \par Interval hx (4/17/23): Pt presents today 4.5 weeks s/p bilateral gynecomastia excision with dermal pedicle, keyhole skin excision concerned with skin redundancy right lateral chest and length of incisions. Denies any f/c or drainage. Compliant with compression vest. \par

## 2023-04-23 NOTE — ASSESSMENT
[FreeTextEntry1] : 51 yo M with PMHx of DM with Grade 3 gynecomastia of bilateral breasts  L>R. Would like to lose additional weight and get closer to 210lbs (217 lbs today in the office)\par \par Now 4.5 weeks s/p double-incision keyhole gynecomastia excision with dermal pedicle (NAC). Doing well. \par \par - Start scarguard to all incisions \par - No heavy lifting/pushing/pulling, importance emphasized\par - Tight glycemic control\par - Continue compression vest 24/7\par - All post op instructions reviewed, all questions answered\par - f/u 1 month with Dr. Pandya\par \par

## 2023-05-12 ENCOUNTER — APPOINTMENT (OUTPATIENT)
Dept: PLASTIC SURGERY | Facility: CLINIC | Age: 53
End: 2023-05-12
Payer: MEDICARE

## 2023-05-12 PROCEDURE — 99024 POSTOP FOLLOW-UP VISIT: CPT

## 2023-05-12 NOTE — ASSESSMENT
[FreeTextEntry1] : 53 yo M with PMHx of DM with Grade 3 gynecomastia of bilateral breasts  L>R. Would like to lose additional weight and get closer to 210lbs (217 lbs today in the office)\par \par 2.5 months s/p double-incision keyhole gynecomastia excision with dermal pedicle (NAC). Doing well. \par \par - may resume physical\par - Tight glycemic control\par - DC compression vest 24/7\par - All post op instructions reviewed, all questions answered\par - f/u 4 month if PRN\par \par

## 2023-05-12 NOTE — DATA REVIEWED
[FreeTextEntry1] :  Pathology             Final\par \par No Documents Attached\par \par \par   [facility] Accession Number : [id number]\par Patient:   MAUREEN CASTAÑEDA\par \par \par Accession:                             [id number]\par \par Collected Date/Time:                   3/16/2023 13:30 EDT\par Received Date/Time:                    3/16/2023 15:14 EDT\par \par Surgical Pathology Report - Auth (Verified)\par \par Specimen(s) Submitted\par 1  Right breast tissue\par Time obtained: 1:30 pm\par Cold ischemic time <1 hour\par 2  Left breast tissue\par Time obtained: 1:30 pm\par Cold ischemic time <1 hour\par \par Final Diagnosis\par \par 1.2.  Right and left breast tissue:\par -  Gynecomastia.\par Verified by: Lisa Hopkins M.D.\par (Electronic Signature)\par Reported on: 03/27/23 14:58 EDT, [facility],\par [address], [place] [address]\par Phone: [phone]   Fax: [phone]\par _________________________________________________________________\par \par \par Clinical Information\par Bilateral gynecomastia excision\par \par Perioperative Diagnosis\par Gynecomastia, symptomatic\par \par Gross Description\par 1. Specimen received fresh labeled "right breast tissue", consist\par of a yellow-tan fibroadipose mass along with multiple fragments of\par fibroadipose tissue weighing 139 g. The larger fibroadipose measures\par 10 x 9 x 5 cm.  Smaller tissue measures 6 x 6 x 1.5 cm in aggregate. On\par sectioning, cut surface is yellow soft with focal white fibrous area. The\par outer surface is inked black.  Representative sections are submitted.\par (10 blocks)\par \par 2. Specimen received fresh labeled "left breast tissue", consists of a\par yellow-tan fibroadipose mass weighing 183 g and measuring 11.5 x 10 x\par 3 cm.  The outer surface is inked black. On sectioning, cut surface is\par yellow soft with focal white fibrous area. Representative sections are\par submitted.\par (11 blocks)\par \par Specimen was received and underwent gross examination at [facility], [address] [place] [address].\par \par \par 03/17/2023 18:32:23 EDT NK\par \par  \par \par  Ordered by: ITZ TRINIDAD       Collected/Examined: 16Mar2023 01:30PM       \par Verification Required       Stage: Final       \par  Performed at: [facility]       Resulted: 27Mar2023 02:58PM       Last Updated: 27Mar2023 02:58PM       Accession: [id number]

## 2023-05-12 NOTE — PHYSICAL EXAM
[de-identified] : well developed pleasant male, NAD [de-identified] : unlabored breathing [de-identified] : JOSE ER [de-identified] : Bilateral incisions healing well, no HTS, excellent contour, improving Bl nipple sensation

## 2023-05-12 NOTE — HISTORY OF PRESENT ILLNESS
[FreeTextEntry1] : 53 yo M with PMHx of HTN, Type II DM last HgA1c 9, , GERD who presents today for evaluation of gynecomastia. Patient endorses having enlarged chest since puberty at 11 with gradual increase in size now c/o asymmetry L>R and left breast discomfort. Patient denies any steroid use but has been taking a lot of Zantc for GERD. \par No change in chest size with weight loss in the past. \par No prior endocrine workup. \par \par Occupation - on disability for RLE \par Social hx - does not smoke cigarettes, no drug use \par Denies any h/o DVT/PE or MRSA infections. \par \par PMD Dr. Mcgill \par \par Interval Hx (8/15/22):  Pt reports no new changes in health, L side has remained more tender than R. Believes his sugars have been betters lately has he has made some changes in his diet. Lab results and mammogram reviewed with pt.  Cr high on labs would recommend follow up with PCP. Mammogram reviewed with minimal retroareolar breast tissue\par \par Inteval hx (11/21/22):  Here for pre-op weight loss check and possible surgical planning.  pt lost 25 lbs, he now 217 lbs.  Pt would like to lose an additional 15 lbs.   His HgA1c 4.6. \par \par Interval hx (1/25/23):  here for weight loss check before BL gynecomastia surgery.  he has lost 5 lbs.  he here to confirm surgical treatment.\par \par Interval hx (2/8/23):  here to discuss pre-op questions.  \par \par Interval hx (3/20/23). Pt here POD#4 s/p bilateral gynecomastia excision with dermal pedicle, keyhole skin excision for dressing change. Doing well. Reporting on significant pain, f/c or drainage. Drains functional, pt did not bring drain log with him. Compliant with chest compression. \par \par Interval hx (3/23/23). Pt here POD#7 s/p bilateral gynecomastia excision with dermal pedicle, keyhole skin excision. Doing well. Denies any pain, f/c or bleeding. Completed all prescribed medications. Wearing compression vest. Drains Rt 30/30, Lt 10/5\par \par Interval hx (3/27/23): Pt here POD#11 s/p bilateral gynecomastia excision with dermal pedicle, keyhole skin excision. Doing well. Denies any pain, f/c or bleeding. Completed all prescribed medications. Wearing compression vest. Drains Rt 20. Compliant with exercise limitations thus far, wants to go back to gym asap. \par \par Interval hx (4/17/23): Pt presents today 4.5 weeks s/p bilateral gynecomastia excision with dermal pedicle, keyhole skin excision concerned with skin redundancy right lateral chest and length of incisions. Denies any f/c or drainage. Compliant with compression vest. \par \par Interval hx (5/12/23):  2.5 months s/p bilateral gynecomastia excision with dermal pedicle, keyhole skin excision\par

## 2023-05-18 ENCOUNTER — APPOINTMENT (OUTPATIENT)
Dept: PODIATRY | Facility: CLINIC | Age: 53
End: 2023-05-18
Payer: MEDICARE

## 2023-05-18 ENCOUNTER — OUTPATIENT (OUTPATIENT)
Dept: OUTPATIENT SERVICES | Facility: HOSPITAL | Age: 53
LOS: 1 days | End: 2023-05-18
Payer: MEDICARE

## 2023-05-18 DIAGNOSIS — Z96.0 PRESENCE OF UROGENITAL IMPLANTS: Chronic | ICD-10-CM

## 2023-05-18 DIAGNOSIS — Z98.890 OTHER SPECIFIED POSTPROCEDURAL STATES: Chronic | ICD-10-CM

## 2023-05-18 DIAGNOSIS — Z00.00 ENCOUNTER FOR GENERAL ADULT MEDICAL EXAMINATION WITHOUT ABNORMAL FINDINGS: ICD-10-CM

## 2023-05-18 PROCEDURE — 99213 OFFICE O/P EST LOW 20 MIN: CPT

## 2023-05-18 RX ORDER — CICLOPIROX 80 MG/ML
8 SOLUTION TOPICAL
Qty: 1 | Refills: 5 | Status: ACTIVE | COMMUNITY
Start: 2022-11-28 | End: 1900-01-01

## 2023-05-24 DIAGNOSIS — E11.42 TYPE 2 DIABETES MELLITUS WITH DIABETIC POLYNEUROPATHY: ICD-10-CM

## 2023-05-24 DIAGNOSIS — M79.671 PAIN IN RIGHT FOOT: ICD-10-CM

## 2023-05-24 DIAGNOSIS — M79.672 PAIN IN LEFT FOOT: ICD-10-CM

## 2023-05-24 DIAGNOSIS — B35.1 TINEA UNGUIUM: ICD-10-CM

## 2023-05-24 NOTE — PHYSICAL EXAM
[General Appearance - Alert] : alert [General Appearance - In No Acute Distress] : in no acute distress [General Appearance - Well Nourished] : well nourished [General Appearance - Well Developed] : well developed [Ankle Swelling (On Exam)] : not present [Ankle Swelling Bilaterally] : bilaterally  [Varicose Veins Of Lower Extremities] : bilaterally [Delayed in the Right Toes] : capillary refills normal in right toes [Delayed in the Left Toes] : capillary refills normal in the left toes [2+] : left foot dorsalis pedis 2+ [de-identified] : Partial amputation 2nd toe, R foot [Skin Turgor] : normal skin turgor [] : no rash [Skin Lesions] : no skin lesions [Foot Ulcer] : no foot ulcer [Skin Induration] : no skin induration [FreeTextEntry1] : One suture in R foot 2nd digit incision site\par Skin edges well-coapted\par No erythema/edema/drainage to 2nd digit sx site\par Skin b/l feet dry [Sensation] : the sensory exam was normal to light touch and pinprick [Diminished Throughout Right Foot] : diminished sensation with monofilament testing throughout right foot [Diminished Throughout Left Foot] : diminished sensation with monofilament testing throughout left foot [Oriented To Time, Place, And Person] : oriented to person, place, and time

## 2023-05-24 NOTE — ASSESSMENT
[FreeTextEntry1] : Assessment:\par -s/p R 2nd digit sx\par -Onychomycosis\par -Tinea pedis\par \par Plan:\par -Pt seen and examined, findings discussed\par -Patient currently has onychomycosis\par -Rx Clotrimazole lacquer for fungal nails\par -Continue clotrimazole 1% solution for tinea\par -RTC 1 month [Verbal] : verbal [Patient] : patient [Good - alert, interested, motivated] : Good - alert, interested, motivated [Demonstrates independently] : demonstrates independently

## 2023-05-25 NOTE — H&P PST ADULT - REASON FOR ADMISSION
Procedure:  COLONOSCOPY  EGD    Relevant Problems   ANESTHESIA (within normal limits)      CARDIO (within normal limits)      ENDO (within normal limits)      GI/HEPATIC  NPO confirmed  BMI 36 2   (+) Fatty liver      /RENAL (within normal limits)      HEMATOLOGY (within normal limits)      PULMONARY (within normal limits)   (-) URI (upper respiratory infection)     No Known Allergies  Social History     Tobacco Use   • Smoking status: Never   • Smokeless tobacco: Never   Vaping Use   • Vaping Use: Never used   Substance Use Topics   • Alcohol use: Yes     Comment: socially   • Drug use: No     Current Outpatient Medications   Medication Instructions   • norethindrone-ethinyl estradiol (JUNEL FE 1/20) 1-20 MG-MCG per tablet 1 tablet, Oral, Daily     Lab Results   Component Value Date    ALB 3 5 02/21/2023    ALKPHOS 72 02/21/2023    ALT 33 02/21/2023    AST 29 02/21/2023    BUN 7 02/21/2023     02/21/2023    CO2 26 02/21/2023    CREATININE 0 60 02/21/2023    GLUC 108 01/06/2022    HCT 33 3 (L) 02/21/2023    HGB 9 4 (L) 02/21/2023    K 4 2 02/21/2023     (H) 02/21/2023    SODIUM 138 02/21/2023    TBILI 0 21 02/21/2023    WBC 7 54 02/21/2023     Vitals:    05/25/23 1000   BP: 139/83   Pulse: 81   Resp: 18   Temp: 97 5 °F (36 4 °C)   SpO2: 96%     Stress test 3/20/20  SUMMARY:  -  Stress results: Duration of exercise was 9 min and 31 sec  Target heart rate was achieved  There was no chest pain during stress  -  ECG conclusions: The stress ECG was negative for ischemia      IMPRESSIONS: Normal study at the work load achieved  Physical Exam    Airway    Mallampati score:  I  TM Distance: >3 FB  Neck ROM: full     Dental   Comment: Denies loose/chipped teeth, No notable dental hx     Cardiovascular  Rhythm: regular, Rate: normal, Cardiovascular exam normal    Pulmonary  Pulmonary exam normal Breath sounds clear to auscultation,     Other Findings        Anesthesia Plan  ASA Score- 2     Anesthesia Type- IV sedation with anesthesia with ASA Monitors  Additional Monitors:   Airway Plan:     Comment: O2 mask, natural airway, EtCO2 monitor  Plan Factors-Exercise tolerance (METS): >4 METS  Chart reviewed  EKG reviewed  Existing labs reviewed  Patient summary reviewed  Patient is not a current smoker  Induction- intravenous  Postoperative Plan-     Informed Consent- Anesthetic plan and risks discussed with patient  I personally reviewed this patient with the CRNA  Discussed and agreed on the Anesthesia Plan with the CRNA  Bharat Cooney implantation of inflatable penile prosthesis under general anesthesia scheduled on 2/25 with Dr Young at [facility]

## 2023-07-20 NOTE — DISCHARGE NOTE ADULT - NS MD DC FALL RISK RISK
[EKG obtained to assist in diagnosis and management of assessed problem(s)] : EKG obtained to assist in diagnosis and management of assessed problem(s) For information on Fall & Injury Prevention, visit [email or web address]

## 2023-08-02 ENCOUNTER — EMERGENCY (EMERGENCY)
Facility: HOSPITAL | Age: 53
LOS: 0 days | Discharge: HOME | End: 2023-08-02
Attending: EMERGENCY MEDICINE
Payer: MEDICARE

## 2023-08-02 VITALS
RESPIRATION RATE: 19 BRPM | TEMPERATURE: 98 F | SYSTOLIC BLOOD PRESSURE: 149 MMHG | OXYGEN SATURATION: 99 % | HEART RATE: 89 BPM | DIASTOLIC BLOOD PRESSURE: 86 MMHG

## 2023-08-02 DIAGNOSIS — Z87.19 PERSONAL HISTORY OF OTHER DISEASES OF THE DIGESTIVE SYSTEM: ICD-10-CM

## 2023-08-02 DIAGNOSIS — Z98.890 OTHER SPECIFIED POSTPROCEDURAL STATES: Chronic | ICD-10-CM

## 2023-08-02 DIAGNOSIS — Z96.0 PRESENCE OF UROGENITAL IMPLANTS: Chronic | ICD-10-CM

## 2023-08-02 DIAGNOSIS — R68.84 JAW PAIN: ICD-10-CM

## 2023-08-02 DIAGNOSIS — E11.9 TYPE 2 DIABETES MELLITUS WITHOUT COMPLICATIONS: ICD-10-CM

## 2023-08-02 DIAGNOSIS — Z96.0 PRESENCE OF UROGENITAL IMPLANTS: ICD-10-CM

## 2023-08-02 DIAGNOSIS — I10 ESSENTIAL (PRIMARY) HYPERTENSION: ICD-10-CM

## 2023-08-02 PROCEDURE — 99283 EMERGENCY DEPT VISIT LOW MDM: CPT | Mod: FS

## 2023-08-02 PROCEDURE — 99283 EMERGENCY DEPT VISIT LOW MDM: CPT

## 2023-08-02 RX ORDER — IBUPROFEN 200 MG
800 TABLET ORAL ONCE
Refills: 0 | Status: COMPLETED | OUTPATIENT
Start: 2023-08-02 | End: 2023-08-02

## 2023-08-02 RX ORDER — TIZANIDINE 4 MG/1
2 TABLET ORAL
Qty: 42 | Refills: 0
Start: 2023-08-02 | End: 2023-08-08

## 2023-08-02 RX ORDER — DEXAMETHASONE 0.5 MG/5ML
10 ELIXIR ORAL ONCE
Refills: 0 | Status: COMPLETED | OUTPATIENT
Start: 2023-08-02 | End: 2023-08-02

## 2023-08-02 RX ADMIN — Medication 10 MILLIGRAM(S): at 14:04

## 2023-08-02 RX ADMIN — Medication 800 MILLIGRAM(S): at 14:04

## 2023-08-02 NOTE — ED PROVIDER NOTE - PHYSICAL EXAMINATION
Physical Exam    Vital Signs: I have reviewed the initial vital signs.  Constitutional: appears stated age, no acute distress  Eyes: Sclera clear, EOMI.  ENT: OP is clear without exudates, tongue without swelling, tenderness over right TMJ with click heard on the right side of jaw when patient opens mouth, Right TM partially obscured by cerumen and otherwise unremarkable, left TM unremarkable  Cardiovascular: S1 and S2, regular rate, regular rhythm, well-perfused extremities, radial pulses equal and 2+, pedal pulses 2+ and equal  Respiratory: unlabored respiratory effort, clear to auscultation bilaterally no wheezing, rales, or rhonchi  Musculoskeletal: supple neck  Integumentary: warm, dry, no rash  Neurologic: awake, alert, oriented x3, extremities’ motor and sensory functions grossly intact

## 2023-08-02 NOTE — ED ADULT NURSE NOTE - NS ED NURSE RECORD ANOTHER HT AND WT
components within normal limits   CBC WITH AUTO DIFFERENTIAL   LIPASE   TROPONIN   BRAIN NATRIURETIC PEPTIDE   TROPONIN       All other labs were within normal range or not returned as of this dictation. EMERGENCY DEPARTMENT COURSE and DIFFERENTIAL DIAGNOSIS/MDM:   Vitals:    Vitals:    04/18/23 2249 04/18/23 2250 04/18/23 2251 04/19/23 0247   BP:   (!) 153/100 116/80   Pulse: 91      Resp: 20      Temp:  97.8 °F (36.6 °C)     TempSrc: Oral      SpO2: 98%      Weight:   300 lb (136.1 kg)      Vitals:    04/19/23 0247   BP: 116/80   Pulse:    Resp:    Temp:    SpO2:            Medications   meloxicam (MOBIC) tablet 7.5 mg (7.5 mg Oral Given 4/18/23 2318)   iopamidol (ISOVUE-370) 76 % injection 75 mL (75 mLs IntraVENous Given 4/19/23 0003)       Is this patient to be included in the SEP-1 Core Measure due to severe sepsis or septic shock? No   Exclusion criteria - the patient is NOT to be included for SEP-1 Core Measure due to: Infection is not suspected        Course and MDM:    27-year-old male presenting for left leg pain now with atypical chest pain which is ongoing issue for him. He arrives comfortable appearing, mildly hypertensive. EKG and troponin on arrival nonischemic. High-sensitivity troponin was then repeated at 1 hour, again in the low risk range. Heart score today to confirm less than 2% risk major cardiac event in 30 days which was discussed with the patient. CT PE study was obtained with his description of complaints, no signs of blood clot. Blood pressure did downtrend appropriately on repeat evaluation after NSAID pain medication. Suspect musculoskeletal type chest pain. He is concerned about left leg pain and swelling, I will order an ultrasound vascular duplex for this to rule out DVT though my suspicion is low. For this reason I decided not to start prophylactic anticoagulation. Given strict precautions to return for new or worsening chest pain or trouble breathing.   Otherwise will Yes

## 2023-08-02 NOTE — ED PROVIDER NOTE - PROGRESS NOTE DETAILS
AY: Patient reports improved pain and has increased ROM with jaw opening after medication administration. Advised patient to follow-up with ENT via referral program. The patient was given detailed return precautions and advised to return to the emergency department if any new symptoms developed, symptoms worsened or for any concerns. The patient was offered the opportunity to ask questions and verbalized that they understand the diagnosis and discharge instructions.

## 2023-08-02 NOTE — ED PROVIDER NOTE - ATTENDING APP SHARED VISIT CONTRIBUTION OF CARE
54 yo m with pmh of htn, dm presents with b/l jaw pain. pt says worse with opening.  no dental pain.  no fever or chills.  has had in the past but not as severe.  no facial swelling.  no throat pain.  no trauma.  able to eat and drink but with some pain.  exam:  ttp b/l TMJ, minimal trismus, OP open and patent, nontender teeth imp: pt with b/l TMJ pain, trial muscle relaxant and nsaids and f/u with dental outpt

## 2023-08-02 NOTE — ED PROVIDER NOTE - CLINICAL SUMMARY MEDICAL DECISION MAKING FREE TEXT BOX
pt with b/l TMJ pain, trial muscle relaxant and nsaids and f/u with dental outpt.  Pt was given strict return to ED precautions and pt indicated that he/she understood.

## 2023-08-02 NOTE — ED ADULT TRIAGE NOTE - INTERNATIONAL TRAVEL
Found by mom-who administered 4 mg narcan. Ems arrived and found pt to have snoring resp. Given additional 2 mg narcan. Pt then woke. Per pt-was using drugs \"recreational\"    No [As Noted in HPI] : as noted in HPI [Negative] : Constitutional

## 2023-08-02 NOTE — ED PROVIDER NOTE - OBJECTIVE STATEMENT
53 year-old male with past medical history DM, HTN presents with complaint of right jaw stiffness x3 days. Patient reports he woke up on Sunday with stiffness to the right jaw and pain with jaw opening. Patient reports tenderness to the right temporomandibular joint area. States he has been able to eat and drink but has limited ROM with o 53 year-old male with past medical history DM, HTN presents with complaint of right jaw stiffness x3 days. Patient reports he woke up on Sunday with stiffness to the right jaw and pain with jaw opening. Patient reports tenderness to the right temporomandibular joint area. States he has been able to eat and drink. Denies f/c, recent illness, cough, sore throat, hoarseness/voice change, injury/trauma, chest pain, shortness of breath, n/v/d, drooling, difficulty tolerating PO.

## 2023-08-02 NOTE — ED PROVIDER NOTE - NSFOLLOWUPINSTRUCTIONS_ED_ALL_ED_FT
Our Emergency Department Referral Coordinators will be reaching out to you in the next 24-48 hours from 9:00am to 5:00pm with a follow up appointment. Please expect a phone call from the hospital in that time frame. If you do not receive a call or if you have any questions or concerns, you can reach them at   [phone].    Temporomandibular Joint Syndrome  Side view of a human skull showing the temporomandibular joint.   Temporomandibular joint syndrome (TMJ syndrome) is a condition that causes pain in the temporomandibular joints. These joints are located near your ears and allow your jaw to open and close. For people with TMJ syndrome, chewing, biting, or other movements of the jaw can be difficult or painful.    TMJ syndrome is often mild and goes away within a few weeks. However, sometimes the condition becomes a long-term (chronic) problem.    What are the causes?  This condition may be caused by:  Grinding your teeth or clenching your jaw. Some people do this when they are stressed.  Arthritis.  An injury to the jaw.  A head or neck injury.  Teeth or dentures that are not aligned well.  In some cases, the cause of TMJ syndrome may not be known.    What are the signs or symptoms?  The most common symptom of this condition is aching pain on the side of the head in the area of the TMJ. Other symptoms may include:  Pain when moving your jaw, such as when chewing or biting.  Not being able to open your jaw all the way.  Making a clicking sound when you open your mouth.  Headache.  Earache.  Neck or shoulder pain.  How is this diagnosed?  This condition may be diagnosed based on:  Your symptoms and medical history.  A physical exam. Your health care provider may check the range of motion of your jaw.  Imaging tests, such as X-rays or an MRI.  You may also need to see your dentist, who will check if your teeth and jaw are lined up correctly.    How is this treated?  TMJ syndrome often goes away on its own. If treatment is needed, it may include:  Eating soft foods and applying ice or heat.  Medicines to relieve pain or inflammation.  Medicines or massage to relax the muscles.  A splint, bite plate, or mouthpiece to prevent teeth grinding or jaw clenching.  Relaxation techniques or counseling to help reduce stress.  A therapy for pain in which an electrical current is applied to the nerves through the skin (transcutaneous electrical nerve stimulation).  Acupuncture. This may help to relieve pain.  Jaw surgery. This is rarely needed.  Follow these instructions at home:  Bag of ice on a towel on the skin.   Eating and drinking    Eat a soft diet if you are having trouble chewing.  Avoid foods that require a lot of chewing. Do not chew gum.  General instructions    Take over-the-counter and prescription medicines only as told by your health care provider.  If directed, put ice on the painful area. To do this:  Put ice in a plastic bag.  Place a towel between your skin and the bag.  Leave the ice on for 20 minutes, 2–3 times a day.  Remove the ice if your skin turns bright red. This is very important. If you cannot feel pain, heat, or cold, you have a greater risk of damage to the area.  Apply a warm, wet cloth (warm compress) to the painful area as told.  Massage your jaw area and do any jaw stretching exercises as told by your health care provider.  If you were given a splint, bite plate, or mouthpiece, wear it as told by your health care provider.  Keep all follow-up visits. This is important.  Where to find more information  [employer]: www.nidcr.nih.gov  Contact a health care provider if:  You have trouble eating.  You have new or worsening symptoms.  Get help right away if:  Your jaw locks.  Summary  Temporomandibular joint syndrome (TMJ syndrome) is a condition that causes pain in the temporomandibular joints. These joints are located near your ears and allow your jaw to open and close.  TMJ syndrome is often mild and goes away within a few weeks. However, sometimes the condition becomes a long-term (chronic) problem.  Symptoms include an aching pain on the side of the head in the area of the TMJ, pain when chewing or biting, and being unable to open your jaw all the way. You may also make a clicking sound when you open your mouth.  TMJ syndrome often goes away on its own. If treatment is needed, it may include medicines to relieve pain, reduce inflammation, or relax the muscles. A splint, bite plate, or mouthpiece may also be used to prevent teeth grinding or jaw clenching.

## 2023-08-07 ENCOUNTER — OUTPATIENT (OUTPATIENT)
Dept: OUTPATIENT SERVICES | Facility: HOSPITAL | Age: 53
LOS: 1 days | End: 2023-08-07

## 2023-08-07 ENCOUNTER — OUTPATIENT (OUTPATIENT)
Dept: OUTPATIENT SERVICES | Facility: HOSPITAL | Age: 53
LOS: 1 days | End: 2023-08-07
Payer: MEDICARE

## 2023-08-07 ENCOUNTER — APPOINTMENT (OUTPATIENT)
Dept: PODIATRY | Facility: CLINIC | Age: 53
End: 2023-08-07
Payer: MEDICARE

## 2023-08-07 ENCOUNTER — RESULT REVIEW (OUTPATIENT)
Age: 53
End: 2023-08-07

## 2023-08-07 DIAGNOSIS — Z98.890 OTHER SPECIFIED POSTPROCEDURAL STATES: Chronic | ICD-10-CM

## 2023-08-07 DIAGNOSIS — M21.42 FLAT FOOT [PES PLANUS] (ACQUIRED), RIGHT FOOT: ICD-10-CM

## 2023-08-07 DIAGNOSIS — L97.519 NON-PRESSURE CHRONIC ULCER OF OTHER PART OF RIGHT FOOT WITH UNSPECIFIED SEVERITY: ICD-10-CM

## 2023-08-07 DIAGNOSIS — Z96.0 PRESENCE OF UROGENITAL IMPLANTS: Chronic | ICD-10-CM

## 2023-08-07 DIAGNOSIS — Z00.00 ENCOUNTER FOR GENERAL ADULT MEDICAL EXAMINATION WITHOUT ABNORMAL FINDINGS: ICD-10-CM

## 2023-08-07 DIAGNOSIS — M21.41 FLAT FOOT [PES PLANUS] (ACQUIRED), RIGHT FOOT: ICD-10-CM

## 2023-08-07 PROCEDURE — 99213 OFFICE O/P EST LOW 20 MIN: CPT

## 2023-08-07 PROCEDURE — 73620 X-RAY EXAM OF FOOT: CPT | Mod: 26,50

## 2023-08-07 NOTE — HISTORY OF PRESENT ILLNESS
[FreeTextEntry1] : Patient presents complaining of bilateral foot pain and new onset of ulceration of right foot lateral hallux  Patient has a history of osteomyelitis and bunion deformity due to hammertoe and bunion deformity of the left foot

## 2023-08-07 NOTE — PHYSICAL EXAM
[General Appearance - Alert] : alert [General Appearance - In No Acute Distress] : in no acute distress [General Appearance - Well Nourished] : well nourished [General Appearance - Well Developed] : well developed [Ankle Swelling (On Exam)] : not present [Ankle Swelling Bilaterally] : bilaterally  [Varicose Veins Of Lower Extremities] : bilaterally [Delayed in the Right Toes] : capillary refills normal in right toes [Delayed in the Left Toes] : capillary refills normal in the left toes [2+] : left foot dorsalis pedis 2+ [de-identified] : Partial amputation 2nd toe, R foot [Skin Turgor] : normal skin turgor [] : no rash [Skin Lesions] : no skin lesions [Foot Ulcer] : no foot ulcer [Skin Induration] : no skin induration [FreeTextEntry1] : One suture in R foot 2nd digit incision site\par  Skin edges well-coapted\par  No erythema/edema/drainage to 2nd digit sx site\par  Skin b/l feet dry [Sensation] : the sensory exam was normal to light touch and pinprick [Diminished Throughout Right Foot] : diminished sensation with monofilament testing throughout right foot [Diminished Throughout Left Foot] : diminished sensation with monofilament testing throughout left foot [Oriented To Time, Place, And Person] : oriented to person, place, and time

## 2023-08-07 NOTE — ASSESSMENT
[FreeTextEntry1] : Patient evaluated history reviewed Bilateral lower extremity hallux valgus and hammertoe New onset of right foot ulcer does not probe Patient did obtain x-rays Discussed surgical intervention for bilateral feet Will obtain x-rays for both And follow-up [Verbal] : verbal [Patient] : patient [Good - alert, interested, motivated] : Good - alert, interested, motivated [Demonstrates independently] : demonstrates independently

## 2023-08-08 DIAGNOSIS — L97.519 NON-PRESSURE CHRONIC ULCER OF OTHER PART OF RIGHT FOOT WITH UNSPECIFIED SEVERITY: ICD-10-CM

## 2023-08-09 DIAGNOSIS — L97.519 NON-PRESSURE CHRONIC ULCER OF OTHER PART OF RIGHT FOOT WITH UNSPECIFIED SEVERITY: ICD-10-CM

## 2023-08-15 DIAGNOSIS — M79.672 PAIN IN LEFT FOOT: ICD-10-CM

## 2023-08-15 DIAGNOSIS — M21.611 BUNION OF RIGHT FOOT: ICD-10-CM

## 2023-08-15 DIAGNOSIS — M21.41 FLAT FOOT [PES PLANUS] (ACQUIRED), RIGHT FOOT: ICD-10-CM
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
## 2023-08-15 DIAGNOSIS — L97.519 NON-PRESSURE CHRONIC ULCER OF OTHER PART OF RIGHT FOOT WITH UNSPECIFIED SEVERITY: ICD-10-CM

## 2023-08-15 DIAGNOSIS — M79.671 PAIN IN RIGHT FOOT: ICD-10-CM
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
## 2023-08-15 DIAGNOSIS — E11.42 TYPE 2 DIABETES MELLITUS WITH DIABETIC POLYNEUROPATHY: ICD-10-CM

## 2023-09-08 ENCOUNTER — OUTPATIENT (OUTPATIENT)
Dept: OUTPATIENT SERVICES | Facility: HOSPITAL | Age: 53
LOS: 1 days | End: 2023-09-08
Payer: MEDICARE

## 2023-09-08 ENCOUNTER — APPOINTMENT (OUTPATIENT)
Dept: PODIATRY | Facility: CLINIC | Age: 53
End: 2023-09-08
Payer: MEDICARE

## 2023-09-08 DIAGNOSIS — Z98.890 OTHER SPECIFIED POSTPROCEDURAL STATES: Chronic | ICD-10-CM

## 2023-09-08 DIAGNOSIS — Z00.00 ENCOUNTER FOR GENERAL ADULT MEDICAL EXAMINATION WITHOUT ABNORMAL FINDINGS: ICD-10-CM

## 2023-09-08 PROCEDURE — 99213 OFFICE O/P EST LOW 20 MIN: CPT

## 2023-09-11 ENCOUNTER — EMERGENCY (EMERGENCY)
Facility: HOSPITAL | Age: 53
LOS: 0 days | Discharge: LEFT BEFORE DISCHARGE | End: 2023-09-11
Attending: EMERGENCY MEDICINE
Payer: MEDICARE

## 2023-09-11 VITALS
TEMPERATURE: 98 F | RESPIRATION RATE: 18 BRPM | WEIGHT: 222.67 LBS | HEART RATE: 100 BPM | DIASTOLIC BLOOD PRESSURE: 85 MMHG | SYSTOLIC BLOOD PRESSURE: 169 MMHG | OXYGEN SATURATION: 100 %

## 2023-09-11 DIAGNOSIS — Z98.890 OTHER SPECIFIED POSTPROCEDURAL STATES: Chronic | ICD-10-CM

## 2023-09-11 DIAGNOSIS — M79.661 PAIN IN RIGHT LOWER LEG: ICD-10-CM

## 2023-09-11 DIAGNOSIS — Z53.21 PROCEDURE AND TREATMENT NOT CARRIED OUT DUE TO PATIENT LEAVING PRIOR TO BEING SEEN BY HEALTH CARE PROVIDER: ICD-10-CM

## 2023-09-11 DIAGNOSIS — Z96.0 PRESENCE OF UROGENITAL IMPLANTS: Chronic | ICD-10-CM

## 2023-09-11 PROCEDURE — L9992: CPT

## 2023-09-17 ENCOUNTER — EMERGENCY (EMERGENCY)
Facility: HOSPITAL | Age: 53
LOS: 0 days | Discharge: HOME | End: 2023-09-17
Attending: EMERGENCY MEDICINE
Payer: MEDICARE

## 2023-09-17 VITALS
OXYGEN SATURATION: 98 % | WEIGHT: 210.1 LBS | TEMPERATURE: 98 F | HEART RATE: 110 BPM | RESPIRATION RATE: 18 BRPM | DIASTOLIC BLOOD PRESSURE: 99 MMHG | SYSTOLIC BLOOD PRESSURE: 179 MMHG | HEIGHT: 77 IN

## 2023-09-17 DIAGNOSIS — L97.529 NON-PRESSURE CHRONIC ULCER OF OTHER PART OF LEFT FOOT WITH UNSPECIFIED SEVERITY: ICD-10-CM

## 2023-09-17 DIAGNOSIS — L08.9 LOCAL INFECTION OF THE SKIN AND SUBCUTANEOUS TISSUE, UNSPECIFIED: ICD-10-CM

## 2023-09-17 DIAGNOSIS — Z87.438 PERSONAL HISTORY OF OTHER DISEASES OF MALE GENITAL ORGANS: ICD-10-CM

## 2023-09-17 DIAGNOSIS — E11.9 TYPE 2 DIABETES MELLITUS WITHOUT COMPLICATIONS: ICD-10-CM

## 2023-09-17 DIAGNOSIS — Z87.19 PERSONAL HISTORY OF OTHER DISEASES OF THE DIGESTIVE SYSTEM: ICD-10-CM

## 2023-09-17 DIAGNOSIS — I10 ESSENTIAL (PRIMARY) HYPERTENSION: ICD-10-CM

## 2023-09-17 DIAGNOSIS — M79.675 PAIN IN LEFT TOE(S): ICD-10-CM

## 2023-09-17 DIAGNOSIS — Z98.890 OTHER SPECIFIED POSTPROCEDURAL STATES: Chronic | ICD-10-CM

## 2023-09-17 PROCEDURE — 87186 SC STD MICRODIL/AGAR DIL: CPT

## 2023-09-17 PROCEDURE — 87205 SMEAR GRAM STAIN: CPT

## 2023-09-17 PROCEDURE — 87070 CULTURE OTHR SPECIMN AEROBIC: CPT

## 2023-09-17 PROCEDURE — 99284 EMERGENCY DEPT VISIT MOD MDM: CPT | Mod: 25

## 2023-09-17 PROCEDURE — 73630 X-RAY EXAM OF FOOT: CPT | Mod: LT

## 2023-09-17 PROCEDURE — 99284 EMERGENCY DEPT VISIT MOD MDM: CPT

## 2023-09-17 PROCEDURE — 73630 X-RAY EXAM OF FOOT: CPT | Mod: 26,LT

## 2023-09-17 RX ORDER — CEPHALEXIN 500 MG
1 CAPSULE ORAL
Qty: 28 | Refills: 0
Start: 2023-09-17 | End: 2023-09-23

## 2023-09-17 NOTE — ED PROVIDER NOTE - CARE PROVIDER_API CALL
Shad Fisher  Podiatric Medicine and Surgery  [address], [address], [address]  [place] [address]  Phone: [phone]  Fax: [phone]  Follow Up Time: Routine

## 2023-09-17 NOTE — ED PROVIDER NOTE - CLINICAL SUMMARY MEDICAL DECISION MAKING FREE TEXT BOX
53-year-old male with past medical history of high blood pressure, diabetes, diabetic foot ulcers, with previous osteomyelitis to left toe, presents with ulcer to left toe has been worsening over the past week, was seen by Dr. Fisher as an outpatient and had an x-ray done on August 7 and was told no osteomyelitis but reports this ulcer has been worsening with odor and purulent discharge.  No fever, chills, n/v, cp,  pleuritic cp, sob, palpitations, diaphoresis, cough, ha/lh/dizziness, numbness/tingling, neck pain/ stiffness, abd pain, diarrhea, constipation, melena/brbpr, urinary symptoms, trauma, weakness, edema, calf pain/swelling/erythema, sick contacts, recent travel.    Vital Signs: I have reviewed the initial vital signs. Constitutional: Non toxic appearing pt sitting on stretcher speaking full sentences. Integumentary: No rash. L second toe with DFU, purulent discharge and odor, no erythema  or streaking, no warmth to palpation, no crepitus, induration, no fluctuance, no signs of trauma, no abscess, (+) soft compartments. ENT: MMM NECK: Supple, non-tender, no meningeal signs. Cardiovascular: RRR, radial pulses 2/4 b/l. dp and pt pulses 2/4 b/l. No JVD. Respiratory: BS present b/l, ctabl, no wheezing or crackles, no accessory muscle use, no stridor. Gastrointestinal: BS present throughout all 4 quadrants, soft, nd, nt, no rebound tenderness or guarding, no cvat. Musculoskeletal: FROM, no focal deficits. no edema, no calf pain/swelling/erythema. Neurologic: AAOx3, motor 5/5 and sensation intact throughout upper and lowe ext, CN II-XII intact, No facial droop or slurring of speech. No focal deficits.    Plan repeat left foot x-ray, podiatry consult, reassess.    Imaging was ordered and reviewed by me. Patient's records (prior hospital, ED visit) were reviewed.  Podiatry consulted.

## 2023-09-17 NOTE — ED PROVIDER NOTE - PHYSICAL EXAMINATION
VITAL SIGNS: I have reviewed nursing notes and confirm.  CONSTITUTIONAL: Well-appearing, non-toxic, in NAD  SKIN: excessively dry, and cracked left foot with an ulcer between the 1st and 2nd interweb digits on the plantar surface;  normal skin turgor  HEAD: NCAT  EYES: No conjunctival injection, scleral anicteric  ENT: Moist mucous membranes, normal pharynx with no erythema or exudates  NECK: Supple; full ROM. Nontender. No cervical LAD  CARD: RRR, no murmurs, rubs or gallops  RESP: Clear to ausculation bilaterally.  No rales, rhonchi, or wheezing.  ABD: Soft, non-distended, non-tender, no rebound or guarding. No CVA tenderness  EXT: Full ROM, no bony tenderness, no pedal edema, no calf tenderness  NEURO: Normal motor, normal sensory. CN II-XII grossly intact. Cerebellar testing normal. Normal gait.  PSYCH: Cooperative, appropriate.

## 2023-09-17 NOTE — ED PROVIDER NOTE - OBJECTIVE STATEMENT
Patient is a 53 year old male with PMH of HTN and DM presenting for toe pain. Patient endorses worsening drainage from the left 2nd digit ulcer. He states that he was in Dr. Fisher clinic last week and was told to come to wound did not improve. Patient denies fevers, chest pain, shortness of breath, nausea, vomiting, or recent trauma to the foot.

## 2023-09-17 NOTE — ED PROVIDER NOTE - NSFOLLOWUPINSTRUCTIONS_ED_ALL_ED_FT
Wound Care, Adult  Taking care of your wound properly can help to prevent pain, infection, and scarring. It can also help your wound heal more quickly. Follow instructions from your health care provider about how to care for your wound.    Supplies needed:  Soap and water.  Wound cleanser, saline, or germ-free (sterile) water.  Gauze.  If needed, a clean bandage (dressing) or other type of wound dressing material to cover or place in the wound. Follow your health care provider's instructions about what dressing supplies to use.  Cream or topical ointment to apply to the wound, if told by your health care provider.  How to care for your wound  Cleaning the wound    Ask your health care provider how to clean the wound. This may include:  Using mild soap and water, a wound cleanser, saline, or sterile water.  Using a clean gauze to pat the wound dry after cleaning it. Do not rub or scrub the wound.  Dressing care    Wash your hands with soap and water for at least 20 seconds before and after you change the dressing. If soap and water are not available, use hand .  Change your dressing as told by your health care provider. This may include:  Cleaning or rinsing out (irrigating) the wound.  Application of cream or topical ointment, if told by your health care provider.  Placing a dressing over the wound or in the wound (packing).  Covering the wound with an outer dressing.  Leave stitches (sutures), staples, skin glue, or adhesive strips in place. These skin closures may need to stay in place for 2 weeks or longer. If adhesive strip edges start to loosen and curl up, you may trim the loose edges. Do not remove adhesive strips completely unless your health care provider tells you to do that.  Ask your health care provider when you can leave the wound uncovered.  Checking for infection    Two stitched wounds. One is normal. The other is red with pus and infected.  Check your wound area every day for signs of infection. Check for:  More redness, swelling, or pain.  Fluid or blood.  Warmth.  Pus or a bad smell.  Follow these instructions at home  Medicines    If you were prescribed an antibiotic medicine, cream, or ointment, take or apply it as told by your health care provider. Do not stop using the antibiotic even if your condition improves.  If you were prescribed pain medicine, take it 30 minutes before you do any wound care or as told by your health care provider.  Take over-the-counter and prescription medicines only as told by your health care provider.  Eating and drinking    Eat a diet that includes protein, vitamin A, vitamin C, and other nutrient-rich foods to help the wound heal.  Foods rich in protein include meat, fish, eggs, dairy, beans, and nuts.  Foods rich in vitamin A include carrots and dark green, leafy vegetables.  Foods rich in vitamin C include citrus fruits, tomatoes, broccoli, and peppers.  Drink enough fluid to keep your urine pale yellow.  General instructions    Do not take baths, swim, or use a hot tub until your health care provider approves. Ask your health care provider if you may take showers. You may only be allowed to take sponge baths.  Do not scratch or pick at the wound. Keep it covered as told by your health care provider.  Return to your normal activities as told by your health care provider. Ask your health care provider what activities are safe for you.  Protect your wound from the sun when you are outside for the first 6 months, or for as long as told by your health care provider. Cover up the scar area or apply sunscreen that has an SPF of at least 30.  Do not use any products that contain nicotine or tobacco. These products include cigarettes, chewing tobacco, and vaping devices, such as e-cigarettes. If you need help quitting, ask your health care provider.  Keep all follow-up visits. This is important.  Contact a health care provider if:  You received a tetanus shot and you have swelling, severe pain, redness, or bleeding at the injection site.  Your pain is not controlled with medicine.  You have any of these signs of infection:  More redness, swelling, or pain around the wound.  Fluid or blood coming from the wound.  Warmth coming from the wound.  A fever or chills.  You are nauseous or you vomit.  You are dizzy.  You have a new rash or hardness around the wound.  Get help right away if:  You have a red streak of skin near the area around your wound.  Pus or a bad smell coming from the wound.  Your wound has been closed with staples, sutures, skin glue, or adhesive strips and it begins to open up and separate.  Your wound is bleeding, and the bleeding does not stop with gentle pressure.  These symptoms may represent a serious problem that is an emergency. Do not wait to see if the symptoms will go away. Get medical help right away. Call your local emergency services (911 in the [place] Do not drive yourself to the hospital.    Summary  Always wash your hands with soap and water for at least 20 seconds before and after changing your dressing.  Change your dressing as told by your health care provider.  To help with healing, eat foods that are rich in protein, vitamin A, vitamin C, and other nutrients.  Check your wound every day for signs of infection. Contact your health care provider if you think that your wound is infected.  This information is not intended to replace advice given to you by your health care provider. Make sure you discuss any questions you have with your health care provider.

## 2023-09-17 NOTE — ED PROVIDER NOTE - PROGRESS NOTE DETAILS
ED Attending KARLI Alba  Podiatry evaluated patient, sent culture, provided wound care.  Report will speak to Dr. Fisher and reassess.

## 2023-09-17 NOTE — CONSULT NOTE ADULT - SUBJECTIVE AND OBJECTIVE BOX
Podiatry Consult Note    Subjective:  Shahbaz CASTAÑEDA a 53 yr/o male who was seen in ED today for complaint of worsening drainage from the L 2nd digit ulcer. He states that he was in Dr. Fisher clinic last week and was told to come to ED if the wound shows signs of infection. Patient states that the last few days it has drained more. He states that he has been applying mupircon and lotion to the interspace and ulcer as well as soaking his foot.    Wound was covered with gauze and kerlix. Patient was wearing leonie surgical shoe.   Patient denies any F/N/V/C/SOB,       PAST MEDICAL & SURGICAL HISTORY:  Diabetes  Hypertension  History of penile disorder  Gallstones  History of eye surgery  History of penile implant  H/O foot surgery          Review of Systems:  [X] Ten point review of systems is otherwise negative except as noted     Objective:  Vital Signs Last 24 Hrs  T(C): 36.8 (17 Sep 2023 11:47), Max: 36.8 (17 Sep 2023 11:47)  T(F): 98.3 (17 Sep 2023 11:47), Max: 98.3 (17 Sep 2023 11:47)  HR: 110 (17 Sep 2023 11:47) (110 - 110)  BP: 179/99 (17 Sep 2023 11:47) (179/99 - 179/99)  BP(mean): --  RR: 18 (17 Sep 2023 11:47) (18 - 18)  SpO2: 98% (17 Sep 2023 11:47) (98% - 98%)    Parameters below as of 17 Sep 2023 11:47  Patient On (Oxygen Delivery Method): room air      Physical Exam - Lower Extremity Focused:   Derm: Partial thickness ulcer noted to the L 2nd digit circumferentially starting in the interpace and extending distally to the level of the sulcus. Sloughing of skin overlying the wound. Fibrogranular base. Mild active serosangious drainage. No malodor. Macerated skin edges. negative probe to bone.  Vascular: DP and PT Pulses Diminished; Foot is Warm to Warm to the touch; Capillary Refill Time < 3 Seconds;    Neuro: Protective Sensation Diminished / Moderately Neuropathic   MSK: Pain On Palpation at Wound Site     Assessment:  Left 2nd Digit Ulcer     Plan:  Chart reviewed and Patient evaluated. All Questions and Concerns Addressed and Answered  XR Imaging  Foot; Pending Results  Wound Culture Obtained; Sent to Microbiology; Pending Results   Local Wound Care; Wound Flushed w/ NS; Wound Packed w/ Betadine Soaked Gauze / DSD / Kerlix   Weight Bearing Status; WBAT w/ Heel Touch w/ Surgical Shoe;   Patient stable from podiatry standpoint. Patient to follow up in outpatient clinic already scheduled for Dr. Fisher in two weeks. Patient to return to ED if symptoms worsen.   Discussed with patient about discontinuing the mupiron and lotion to area. Discussed the importance of keeping the wound clean, dry and covered.   Patient to be placed on oral antibiotics prior to discharge: discussed with ED doctor; Bactrim and Keflex.   Discussed Plan w/ Dr. Fisher    Podiatry

## 2023-09-17 NOTE — ED ADULT TRIAGE NOTE - CHIEF COMPLAINT QUOTE
Patient presents to ED c/o ulcer to second digit of left foot worsening over last week. Patient was seen in ED recently and f/u with Dr. Fisher.

## 2023-09-17 NOTE — ED PROVIDER NOTE - ATTENDING CONTRIBUTION TO CARE
53-year-old male with past medical history of high blood pressure, diabetes, diabetic foot ulcers, with previous osteomyelitis to left toe, presents with ulcer to left toe has been worsening over the past week, was seen by Dr. Fisher as an outpatient and had an x-ray done on August 7 and was told no osteomyelitis but reports this ulcer has been worsening with odor and purulent discharge.  No fever, chills, n/v, cp,  pleuritic cp, sob, palpitations, diaphoresis, cough, ha/lh/dizziness, numbness/tingling, neck pain/ stiffness, abd pain, diarrhea, constipation, melena/brbpr, urinary symptoms, trauma, weakness, edema, calf pain/swelling/erythema, sick contacts, recent travel.    Vital Signs: I have reviewed the initial vital signs. Constitutional: Non toxic appearing pt sitting on stretcher speaking full sentences. Integumentary: No rash. L second toe with DFU, purulent discharge and odor, no erythema  or streaking, no warmth to palpation, no crepitus, induration, no fluctuance, no signs of trauma, no abscess, (+) soft compartments. ENT: MMM NECK: Supple, non-tender, no meningeal signs. Cardiovascular: RRR, radial pulses 2/4 b/l. dp and pt pulses 2/4 b/l. No JVD. Respiratory: BS present b/l, ctabl, no wheezing or crackles, no accessory muscle use, no stridor. Gastrointestinal: BS present throughout all 4 quadrants, soft, nd, nt, no rebound tenderness or guarding, no cvat. Musculoskeletal: FROM, no focal deficits. no edema, no calf pain/swelling/erythema. Neurologic: AAOx3, motor 5/5 and sensation intact throughout upper and lowe ext, CN II-XII intact, No facial droop or slurring of speech. No focal deficits.    Plan repeat left foot x-ray, podiatry consult, reassess.

## 2023-09-17 NOTE — ED PROVIDER NOTE - PATIENT PORTAL LINK FT
You can access the FollowMyHealth Patient Portal offered by [facility] by registering at the following website: [email or web address]. By joining [facility]’s FollowMyHealth portal, you will also be able to view your health information using other applications (apps) compatible with our system.

## 2023-09-18 ENCOUNTER — APPOINTMENT (OUTPATIENT)
Dept: PLASTIC SURGERY | Facility: CLINIC | Age: 53
End: 2023-09-18
Payer: MEDICARE

## 2023-09-18 LAB
GRAM STN FLD: SIGNIFICANT CHANGE UP
SPECIMEN SOURCE: SIGNIFICANT CHANGE UP

## 2023-09-19 LAB
-  AMIKACIN: SIGNIFICANT CHANGE UP
-  AZTREONAM: SIGNIFICANT CHANGE UP
-  CEFEPIME: SIGNIFICANT CHANGE UP
-  CEFTAZIDIME: SIGNIFICANT CHANGE UP
-  CIPROFLOXACIN: SIGNIFICANT CHANGE UP
-  GENTAMICIN: SIGNIFICANT CHANGE UP
-  IMIPENEM: SIGNIFICANT CHANGE UP
-  LEVOFLOXACIN: SIGNIFICANT CHANGE UP
-  MEROPENEM: SIGNIFICANT CHANGE UP
-  PIPERACILLIN/TAZOBACTAM: SIGNIFICANT CHANGE UP
-  TOBRAMYCIN: SIGNIFICANT CHANGE UP
METHOD TYPE: SIGNIFICANT CHANGE UP

## 2023-09-20 ENCOUNTER — APPOINTMENT (OUTPATIENT)
Dept: PLASTIC SURGERY | Facility: CLINIC | Age: 53
End: 2023-09-20
Payer: MEDICARE

## 2023-09-20 PROCEDURE — 99212 OFFICE O/P EST SF 10 MIN: CPT

## 2023-09-20 RX ORDER — LEVOFLOXACIN 5 MG/ML
1 INJECTION, SOLUTION INTRAVENOUS
Qty: 14 | Refills: 0
Start: 2023-09-20 | End: 2023-10-03

## 2023-09-20 NOTE — ED POST DISCHARGE NOTE - RESULT SUMMARY
ABSCESS CX = PSEUDOMONAS. HAD TO SWITCH ANTIBIOTICS TO LEVAQUIN. WILL STOP BACTRIM AND KEFLEX. HAS APPT WITH DPM WITHIN 48 HRS

## 2023-09-21 DIAGNOSIS — M21.611 BUNION OF RIGHT FOOT: ICD-10-CM
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
## 2023-09-21 DIAGNOSIS — M79.671 PAIN IN RIGHT FOOT: ICD-10-CM

## 2023-09-21 DIAGNOSIS — E11.42 TYPE 2 DIABETES MELLITUS WITH DIABETIC POLYNEUROPATHY: ICD-10-CM

## 2023-09-21 DIAGNOSIS — M79.672 PAIN IN LEFT FOOT: ICD-10-CM

## 2023-09-22 LAB
CULTURE RESULTS: SIGNIFICANT CHANGE UP
ORGANISM # SPEC MICROSCOPIC CNT: SIGNIFICANT CHANGE UP
ORGANISM # SPEC MICROSCOPIC CNT: SIGNIFICANT CHANGE UP
SPECIMEN SOURCE: SIGNIFICANT CHANGE UP

## 2023-09-25 ENCOUNTER — OUTPATIENT (OUTPATIENT)
Dept: OUTPATIENT SERVICES | Facility: HOSPITAL | Age: 53
LOS: 1 days | End: 2023-09-25
Payer: MEDICARE

## 2023-09-25 ENCOUNTER — APPOINTMENT (OUTPATIENT)
Dept: PODIATRY | Facility: CLINIC | Age: 53
End: 2023-09-25
Payer: MEDICARE

## 2023-09-25 DIAGNOSIS — Z98.890 OTHER SPECIFIED POSTPROCEDURAL STATES: Chronic | ICD-10-CM

## 2023-09-25 DIAGNOSIS — E11.8 TYPE 2 DIABETES MELLITUS WITH UNSPECIFIED COMPLICATIONS: ICD-10-CM

## 2023-09-25 DIAGNOSIS — Z00.00 ENCOUNTER FOR GENERAL ADULT MEDICAL EXAMINATION WITHOUT ABNORMAL FINDINGS: ICD-10-CM

## 2023-09-25 DIAGNOSIS — Z96.0 PRESENCE OF UROGENITAL IMPLANTS: Chronic | ICD-10-CM

## 2023-09-25 DIAGNOSIS — L97.519 NON-PRESSURE CHRONIC ULCER OF OTHER PART OF RIGHT FOOT WITH UNSPECIFIED SEVERITY: ICD-10-CM

## 2023-09-25 PROCEDURE — 99214 OFFICE O/P EST MOD 30 MIN: CPT

## 2023-09-25 PROCEDURE — 99214 OFFICE O/P EST MOD 30 MIN: CPT | Mod: 95

## 2023-09-26 ENCOUNTER — INPATIENT (INPATIENT)
Facility: HOSPITAL | Age: 53
LOS: 7 days | Discharge: HOME | DRG: 617 | End: 2023-10-04
Attending: INTERNAL MEDICINE | Admitting: INTERNAL MEDICINE
Payer: MEDICARE

## 2023-09-26 VITALS
HEIGHT: 77 IN | SYSTOLIC BLOOD PRESSURE: 165 MMHG | OXYGEN SATURATION: 100 % | TEMPERATURE: 98 F | HEART RATE: 102 BPM | RESPIRATION RATE: 18 BRPM | DIASTOLIC BLOOD PRESSURE: 82 MMHG | WEIGHT: 214.95 LBS

## 2023-09-26 DIAGNOSIS — M00.9 PYOGENIC ARTHRITIS, UNSPECIFIED: ICD-10-CM

## 2023-09-26 DIAGNOSIS — Z98.890 OTHER SPECIFIED POSTPROCEDURAL STATES: Chronic | ICD-10-CM

## 2023-09-26 DIAGNOSIS — E11.22 TYPE 2 DIABETES MELLITUS WITH DIABETIC CHRONIC KIDNEY DISEASE: ICD-10-CM

## 2023-09-26 DIAGNOSIS — Z79.899 OTHER LONG TERM (CURRENT) DRUG THERAPY: ICD-10-CM

## 2023-09-26 DIAGNOSIS — E11.621 TYPE 2 DIABETES MELLITUS WITH FOOT ULCER: ICD-10-CM

## 2023-09-26 DIAGNOSIS — L97.509 NON-PRESSURE CHRONIC ULCER OF OTHER PART OF UNSPECIFIED FOOT WITH UNSPECIFIED SEVERITY: ICD-10-CM

## 2023-09-26 DIAGNOSIS — N18.31 CHRONIC KIDNEY DISEASE, STAGE 3A: ICD-10-CM

## 2023-09-26 DIAGNOSIS — Z96.0 PRESENCE OF UROGENITAL IMPLANTS: Chronic | ICD-10-CM

## 2023-09-26 DIAGNOSIS — Z96.0 PRESENCE OF UROGENITAL IMPLANTS: ICD-10-CM

## 2023-09-26 DIAGNOSIS — E11.69 TYPE 2 DIABETES MELLITUS WITH OTHER SPECIFIED COMPLICATION: ICD-10-CM

## 2023-09-26 DIAGNOSIS — M86.172 OTHER ACUTE OSTEOMYELITIS, LEFT ANKLE AND FOOT: ICD-10-CM

## 2023-09-26 DIAGNOSIS — Z79.84 LONG TERM (CURRENT) USE OF ORAL HYPOGLYCEMIC DRUGS: ICD-10-CM

## 2023-09-26 DIAGNOSIS — I12.9 HYPERTENSIVE CHRONIC KIDNEY DISEASE WITH STAGE 1 THROUGH STAGE 4 CHRONIC KIDNEY DISEASE, OR UNSPECIFIED CHRONIC KIDNEY DISEASE: ICD-10-CM

## 2023-09-26 DIAGNOSIS — E11.52 TYPE 2 DIABETES MELLITUS WITH DIABETIC PERIPHERAL ANGIOPATHY WITH GANGRENE: ICD-10-CM

## 2023-09-26 DIAGNOSIS — I70.221 ATHEROSCLEROSIS OF NATIVE ARTERIES OF EXTREMITIES WITH REST PAIN, RIGHT LEG: ICD-10-CM

## 2023-09-26 LAB
A1C WITH ESTIMATED AVERAGE GLUCOSE RESULT: 5 % — SIGNIFICANT CHANGE UP (ref 4–5.6)
ALBUMIN SERPL ELPH-MCNC: 4.2 G/DL — SIGNIFICANT CHANGE UP (ref 3.5–5.2)
ALP SERPL-CCNC: 115 U/L — SIGNIFICANT CHANGE UP (ref 30–115)
ALT FLD-CCNC: 21 U/L — SIGNIFICANT CHANGE UP (ref 0–41)
ANION GAP SERPL CALC-SCNC: 10 MMOL/L — SIGNIFICANT CHANGE UP (ref 7–14)
APTT BLD: 37.2 SEC — SIGNIFICANT CHANGE UP (ref 27–39.2)
AST SERPL-CCNC: 18 U/L — SIGNIFICANT CHANGE UP (ref 0–41)
BASOPHILS # BLD AUTO: 0.04 K/UL — SIGNIFICANT CHANGE UP (ref 0–0.2)
BASOPHILS NFR BLD AUTO: 0.6 % — SIGNIFICANT CHANGE UP (ref 0–1)
BILIRUB SERPL-MCNC: 0.3 MG/DL — SIGNIFICANT CHANGE UP (ref 0.2–1.2)
BLD GP AB SCN SERPL QL: SIGNIFICANT CHANGE UP
BLD GP AB SCN SERPL QL: SIGNIFICANT CHANGE UP
BUN SERPL-MCNC: 27 MG/DL — HIGH (ref 10–20)
CALCIUM SERPL-MCNC: 9.4 MG/DL — SIGNIFICANT CHANGE UP (ref 8.4–10.5)
CHLORIDE SERPL-SCNC: 102 MMOL/L — SIGNIFICANT CHANGE UP (ref 98–110)
CHOLEST SERPL-MCNC: 105 MG/DL — SIGNIFICANT CHANGE UP
CO2 SERPL-SCNC: 28 MMOL/L — SIGNIFICANT CHANGE UP (ref 17–32)
CREAT SERPL-MCNC: 1.5 MG/DL — SIGNIFICANT CHANGE UP (ref 0.7–1.5)
CRP SERPL-MCNC: 38.4 MG/L — HIGH
EGFR: 55 ML/MIN/1.73M2 — LOW
EOSINOPHIL # BLD AUTO: 0.07 K/UL — SIGNIFICANT CHANGE UP (ref 0–0.7)
EOSINOPHIL NFR BLD AUTO: 1.1 % — SIGNIFICANT CHANGE UP (ref 0–8)
ERYTHROCYTE [SEDIMENTATION RATE] IN BLOOD: 65 MM/HR — HIGH (ref 0–10)
ESTIMATED AVERAGE GLUCOSE: 97 MG/DL — SIGNIFICANT CHANGE UP (ref 68–114)
GLUCOSE BLDC GLUCOMTR-MCNC: 110 MG/DL — HIGH (ref 70–99)
GLUCOSE SERPL-MCNC: 112 MG/DL — HIGH (ref 70–99)
HCT VFR BLD CALC: 35.2 % — LOW (ref 42–52)
HDLC SERPL-MCNC: 32 MG/DL — LOW
HGB BLD-MCNC: 10.6 G/DL — LOW (ref 14–18)
IMM GRANULOCYTES NFR BLD AUTO: 0.2 % — SIGNIFICANT CHANGE UP (ref 0.1–0.3)
INR BLD: 1.13 RATIO — SIGNIFICANT CHANGE UP (ref 0.65–1.3)
LIPID PNL WITH DIRECT LDL SERPL: 61 MG/DL — SIGNIFICANT CHANGE UP
LYMPHOCYTES # BLD AUTO: 1.07 K/UL — LOW (ref 1.2–3.4)
LYMPHOCYTES # BLD AUTO: 16.1 % — LOW (ref 20.5–51.1)
MCHC RBC-ENTMCNC: 23.2 PG — LOW (ref 27–31)
MCHC RBC-ENTMCNC: 30.1 G/DL — LOW (ref 32–37)
MCV RBC AUTO: 77 FL — LOW (ref 80–94)
MONOCYTES # BLD AUTO: 0.69 K/UL — HIGH (ref 0.1–0.6)
MONOCYTES NFR BLD AUTO: 10.4 % — HIGH (ref 1.7–9.3)
NEUTROPHILS # BLD AUTO: 4.75 K/UL — SIGNIFICANT CHANGE UP (ref 1.4–6.5)
NEUTROPHILS NFR BLD AUTO: 71.6 % — SIGNIFICANT CHANGE UP (ref 42.2–75.2)
NON HDL CHOLESTEROL: 73 MG/DL — SIGNIFICANT CHANGE UP
NRBC # BLD: 0 /100 WBCS — SIGNIFICANT CHANGE UP (ref 0–0)
PLATELET # BLD AUTO: 349 K/UL — SIGNIFICANT CHANGE UP (ref 130–400)
PMV BLD: 9.1 FL — SIGNIFICANT CHANGE UP (ref 7.4–10.4)
POTASSIUM SERPL-MCNC: 4.4 MMOL/L — SIGNIFICANT CHANGE UP (ref 3.5–5)
POTASSIUM SERPL-SCNC: 4.4 MMOL/L — SIGNIFICANT CHANGE UP (ref 3.5–5)
PROT SERPL-MCNC: 7 G/DL — SIGNIFICANT CHANGE UP (ref 6–8)
PROTHROM AB SERPL-ACNC: 12.9 SEC — HIGH (ref 9.95–12.87)
RBC # BLD: 4.57 M/UL — LOW (ref 4.7–6.1)
RBC # FLD: 12 % — SIGNIFICANT CHANGE UP (ref 11.5–14.5)
SODIUM SERPL-SCNC: 140 MMOL/L — SIGNIFICANT CHANGE UP (ref 135–146)
TRIGL SERPL-MCNC: 61 MG/DL — SIGNIFICANT CHANGE UP
WBC # BLD: 6.63 K/UL — SIGNIFICANT CHANGE UP (ref 4.8–10.8)
WBC # FLD AUTO: 6.63 K/UL — SIGNIFICANT CHANGE UP (ref 4.8–10.8)

## 2023-09-26 PROCEDURE — 80053 COMPREHEN METABOLIC PANEL: CPT

## 2023-09-26 PROCEDURE — 73630 X-RAY EXAM OF FOOT: CPT | Mod: LT

## 2023-09-26 PROCEDURE — 93925 LOWER EXTREMITY STUDY: CPT

## 2023-09-26 PROCEDURE — 82728 ASSAY OF FERRITIN: CPT

## 2023-09-26 PROCEDURE — 83036 HEMOGLOBIN GLYCOSYLATED A1C: CPT

## 2023-09-26 PROCEDURE — 86140 C-REACTIVE PROTEIN: CPT

## 2023-09-26 PROCEDURE — C1751: CPT

## 2023-09-26 PROCEDURE — 87205 SMEAR GRAM STAIN: CPT

## 2023-09-26 PROCEDURE — 85025 COMPLETE CBC W/AUTO DIFF WBC: CPT

## 2023-09-26 PROCEDURE — 88311 DECALCIFY TISSUE: CPT

## 2023-09-26 PROCEDURE — 85610 PROTHROMBIN TIME: CPT

## 2023-09-26 PROCEDURE — 84466 ASSAY OF TRANSFERRIN: CPT

## 2023-09-26 PROCEDURE — 87641 MR-STAPH DNA AMP PROBE: CPT

## 2023-09-26 PROCEDURE — 85730 THROMBOPLASTIN TIME PARTIAL: CPT

## 2023-09-26 PROCEDURE — 0225U NFCT DS DNA&RNA 21 SARSCOV2: CPT

## 2023-09-26 PROCEDURE — 36415 COLL VENOUS BLD VENIPUNCTURE: CPT

## 2023-09-26 PROCEDURE — 73718 MRI LOWER EXTREMITY W/O DYE: CPT | Mod: LT

## 2023-09-26 PROCEDURE — 99285 EMERGENCY DEPT VISIT HI MDM: CPT | Mod: GC

## 2023-09-26 PROCEDURE — 87186 SC STD MICRODIL/AGAR DIL: CPT

## 2023-09-26 PROCEDURE — 99223 1ST HOSP IP/OBS HIGH 75: CPT

## 2023-09-26 PROCEDURE — 88305 TISSUE EXAM BY PATHOLOGIST: CPT

## 2023-09-26 PROCEDURE — 93005 ELECTROCARDIOGRAM TRACING: CPT

## 2023-09-26 PROCEDURE — 83550 IRON BINDING TEST: CPT

## 2023-09-26 PROCEDURE — 93925 LOWER EXTREMITY STUDY: CPT | Mod: 26

## 2023-09-26 PROCEDURE — 93970 EXTREMITY STUDY: CPT

## 2023-09-26 PROCEDURE — 87075 CULTR BACTERIA EXCEPT BLOOD: CPT

## 2023-09-26 PROCEDURE — 86901 BLOOD TYPING SEROLOGIC RH(D): CPT

## 2023-09-26 PROCEDURE — 87640 STAPH A DNA AMP PROBE: CPT

## 2023-09-26 PROCEDURE — 85652 RBC SED RATE AUTOMATED: CPT

## 2023-09-26 PROCEDURE — 86850 RBC ANTIBODY SCREEN: CPT

## 2023-09-26 PROCEDURE — 73630 X-RAY EXAM OF FOOT: CPT | Mod: 26,LT

## 2023-09-26 PROCEDURE — 86900 BLOOD TYPING SEROLOGIC ABO: CPT

## 2023-09-26 PROCEDURE — 85027 COMPLETE CBC AUTOMATED: CPT

## 2023-09-26 PROCEDURE — 88304 TISSUE EXAM BY PATHOLOGIST: CPT

## 2023-09-26 PROCEDURE — 83540 ASSAY OF IRON: CPT

## 2023-09-26 PROCEDURE — 87070 CULTURE OTHR SPECIMN AEROBIC: CPT

## 2023-09-26 PROCEDURE — 36573 INSJ PICC RS&I 5 YR+: CPT

## 2023-09-26 PROCEDURE — 82962 GLUCOSE BLOOD TEST: CPT

## 2023-09-26 PROCEDURE — 93970 EXTREMITY STUDY: CPT | Mod: 26

## 2023-09-26 PROCEDURE — 83735 ASSAY OF MAGNESIUM: CPT

## 2023-09-26 PROCEDURE — 80061 LIPID PANEL: CPT

## 2023-09-26 RX ORDER — GABAPENTIN 400 MG/1
300 CAPSULE ORAL EVERY 8 HOURS
Refills: 0 | Status: DISCONTINUED | OUTPATIENT
Start: 2023-09-26 | End: 2023-09-29

## 2023-09-26 RX ORDER — LANOLIN ALCOHOL/MO/W.PET/CERES
3 CREAM (GRAM) TOPICAL AT BEDTIME
Refills: 0 | Status: DISCONTINUED | OUTPATIENT
Start: 2023-09-26 | End: 2023-09-29

## 2023-09-26 RX ORDER — ACETAMINOPHEN 500 MG
650 TABLET ORAL EVERY 6 HOURS
Refills: 0 | Status: DISCONTINUED | OUTPATIENT
Start: 2023-09-26 | End: 2023-09-29

## 2023-09-26 RX ORDER — METFORMIN HYDROCHLORIDE 850 MG/1
1 TABLET ORAL
Refills: 0 | DISCHARGE

## 2023-09-26 RX ORDER — CEFEPIME 1 G/1
1000 INJECTION, POWDER, FOR SOLUTION INTRAMUSCULAR; INTRAVENOUS ONCE
Refills: 0 | Status: COMPLETED | OUTPATIENT
Start: 2023-09-26 | End: 2023-09-26

## 2023-09-26 RX ORDER — LOSARTAN POTASSIUM 100 MG/1
1 TABLET, FILM COATED ORAL
Qty: 0 | Refills: 0 | DISCHARGE

## 2023-09-26 RX ORDER — LOSARTAN POTASSIUM 100 MG/1
1 TABLET, FILM COATED ORAL
Refills: 0 | DISCHARGE

## 2023-09-26 RX ORDER — LOSARTAN POTASSIUM 100 MG/1
25 TABLET, FILM COATED ORAL DAILY
Refills: 0 | Status: DISCONTINUED | OUTPATIENT
Start: 2023-09-26 | End: 2023-09-26

## 2023-09-26 RX ORDER — LOSARTAN/HYDROCHLOROTHIAZIDE 100MG-25MG
1 TABLET ORAL
Refills: 0 | DISCHARGE

## 2023-09-26 RX ORDER — HEPARIN SODIUM 5000 [USP'U]/ML
5000 INJECTION INTRAVENOUS; SUBCUTANEOUS EVERY 8 HOURS
Refills: 0 | Status: DISCONTINUED | OUTPATIENT
Start: 2023-09-26 | End: 2023-09-29

## 2023-09-26 RX ORDER — CHLORHEXIDINE GLUCONATE 213 G/1000ML
1 SOLUTION TOPICAL
Refills: 0 | Status: DISCONTINUED | OUTPATIENT
Start: 2023-09-26 | End: 2023-09-29

## 2023-09-26 RX ORDER — GABAPENTIN 400 MG/1
1 CAPSULE ORAL
Qty: 0 | Refills: 0 | DISCHARGE

## 2023-09-26 RX ORDER — GABAPENTIN 400 MG/1
1 CAPSULE ORAL
Refills: 0 | DISCHARGE

## 2023-09-26 RX ORDER — PANTOPRAZOLE SODIUM 20 MG/1
40 TABLET, DELAYED RELEASE ORAL
Refills: 0 | Status: DISCONTINUED | OUTPATIENT
Start: 2023-09-26 | End: 2023-09-29

## 2023-09-26 RX ORDER — OXYCODONE HYDROCHLORIDE 5 MG/1
5 TABLET ORAL EVERY 6 HOURS
Refills: 0 | Status: DISCONTINUED | OUTPATIENT
Start: 2023-09-26 | End: 2023-09-29

## 2023-09-26 RX ORDER — LOSARTAN POTASSIUM 100 MG/1
50 TABLET, FILM COATED ORAL DAILY
Refills: 0 | Status: DISCONTINUED | OUTPATIENT
Start: 2023-09-26 | End: 2023-09-29

## 2023-09-26 RX ADMIN — CEFEPIME 100 MILLIGRAM(S): 1 INJECTION, POWDER, FOR SOLUTION INTRAMUSCULAR; INTRAVENOUS at 13:28

## 2023-09-26 NOTE — H&P ADULT - ASSESSMENT
Wound Culture Obtained; Sent to Microbiology; Pending Results   Local Wound Care; Wound Flushed w/ NS; Wound Packed w/ Betadine Soaked Gauze / DSD / Kerlix   Weight Bearing Status; WBAT w/ Heel Touch w/ Surgical Shoe;   Recommend; Lower Extremity Arterial Duplex B/L; ESR/CRP;   Request ID Consult;  / Follow Up w/ Wound Culture  Patient to be scheduled for surgery - Excisional Debridement of Soft Tissue and Bone with 2nd digit amputation, L foot.   Medical clearance please  Discussed Plan w/ Dr. Fisher    XR Imaging  Foot consistent with OM of 2nd L toe  Get Lower Extremity Arterial Duplex B/L  Get ESR/CRP & ID Consult  Follow Up with Wound Culture. prior wound cx form sep 17 growing pseudomonas sensitive to cipro  Pt received one dose of levo & cefepime in ER. Continue Levofloxacin for now. Will leave it to ID whether or not want to hold off abx for surgical yield cx  Weight Bearing Status; WBAT with Heel Touch w/ Surgical Shoe  A1c 4.4 02/2023. Not on any meds  Get Anemia work up  Possible CKD stage 3- at baseline  Continue home meds including losartan , gabapentin  Patient scheduled for surgery Friday 9/29  for Excisional Debridement of Soft Tissue and Bone with 2nd digit amputation, L foot.        Patient scheduled for surgery Friday 9/29 at 12:30pm - Excisional Debridement of Soft Tissue and Bone with 2nd digit amputation, L foot.   NPO MN thurs 9/28  medical clearance please.        #DVT PPx-   #GI PPx-  #Diet-   #CHG  #Activity-   #Dispo-   #Code-    53 yr/o male with PMH of DM and HTN who was seen bedside in the ED today for complaint of worsening L 2nd toe infection. As per patient he noticed a small ulcer/wound on the left second toe 2 weeks ago, which was growing in size with foul smelling drainage. He said that he had amputation of the right second toe with Dr Fisher before. He was seen by Podiatry team in OP clinic and was sent to ED for debridement which is scheduled to be on Friday.   Patient presented to ED a week ago where he noticed sloughing of skin and drainage from the L 2nd toe. At that time, the wound did not probe to bone or show any other concern of infection and was prescribed PO bactrim and keflex. Patient followed up in Dr. Fisher outpatient clinic yesterday and was told to return to ED for surgical intervention.    As per patient he only takes Metformin( non-complaint) and Losartan at home    # Left 2nd toe wound  - XR Imaging  Foot consistent with OM of 2nd L toe  - f/u Wound Culture pending  - prior wound cx form sep 17 growing pseudomonas sensitive to cipro  - Local Wound Care done by podiatry   - Weight Bearing Status; WBAT w/ Heel Touch w/ Surgical Shoe;   - f/u Lower Extremity Arterial Duplex B/L;   - f/u ESR/CRP;   - f/u ID Consult;    - scheduled for surgery on Friday as per patient - Excisional Debridement of Soft Tissue and Bone with 2nd digit amputation, L foot.   - Podiatry requesting Medical clearance   - s/p  one dose of levo & cefepime in ER.   - Continue Levofloxacin for now.   - Weight Bearing Status; WBAT with Heel Touch w/ Surgical Shoe  - Possible CKD stage 3- at baseline?  - NPO MN thurs 9/28    # CKD stage 3a  - avoid nephrotoxic agent    # HTN  - on losartan     # DM  - as per patient he is no Metformin 750 mg QD but doesn't take it     #DVT PPx- heparin sub q  #GI PPx-protonix  #Diet- DASH  #Activity- WBAT  #Dispo- Med- Sug   53 yr/o male with PMH of DM and HTN who was seen bedside in the ED today for complaint of worsening L 2nd toe infection. As per patient he noticed a small ulcer/wound on the left second toe 2 weeks ago, which was growing in size with foul smelling drainage. He said that he had amputation of the right second toe with Dr Fisher before. He was seen by Podiatry team in OP clinic and was sent to ED for debridement which is scheduled to be on Friday.   Patient presented to ED a week ago where he noticed sloughing of skin and drainage from the L 2nd toe. At that time, the wound did not probe to bone or show any other concern of infection and was prescribed PO bactrim and keflex. Patient followed up in Dr. Fisher outpatient clinic yesterday and was told to return to ED for surgical intervention.    As per patient he only takes Metformin( non-complaint) and Losartan- HCTZ and Gabapentin at home    # Left 2nd toe wound  - XR Imaging  Foot consistent with OM of 2nd L toe  - f/u Wound Culture pending  - prior wound cx form sep 17 growing pseudomonas sensitive to cipro  - Local Wound Care done by podiatry   - Weight Bearing Status; WBAT w/ Heel Touch w/ Surgical Shoe;   - f/u Lower Extremity Arterial Duplex B/L;   - f/u ESR/CRP;   - f/u ID Consult;    - scheduled for surgery on Friday as per patient - Excisional Debridement of Soft Tissue and Bone with 2nd digit amputation, L foot.   - Podiatry requesting Medical clearance   - s/p  one dose of levo & cefepime in ER.   - Continue Levofloxacin for now.   - Weight Bearing Status; WBAT with Heel Touch w/ Surgical Shoe  - Possible CKD stage 3- at baseline?  - NPO MN thurs 9/28    # CKD stage 3a  - avoid nephrotoxic agent    # HTN  - on losartan     # DM  - as per patient he is no Metformin 750 mg QD but doesn't take it     #DVT PPx- heparin sub q  #GI PPx-protonix  #Diet- DASH  #Activity- WBAT  #Dispo- Med- Sug

## 2023-09-26 NOTE — H&P ADULT - ATTENDING COMMENTS
Pt was seen and examined at bedside independently, pt is c/o infected left foot ulcer, he is not in pain, denies fever, chills.   I agree with medical resident's findings, assessment and plan above with a few corrections in my note.     Vital Signs Last 24 Hrs  T(C): 35.7 (27 Sep 2023 13:36), Max: 36.2 (27 Sep 2023 04:59)  T(F): 96.3 (27 Sep 2023 13:36), Max: 97.2 (27 Sep 2023 04:59)  HR: 80 (27 Sep 2023 13:36) (80 - 82)  BP: 146/83 (27 Sep 2023 13:36) (146/83 - 158/88)  BP(mean): --  RR: 18 (27 Sep 2023 13:36) (17 - 18)  SpO2: 99% (26 Sep 2023 22:01) (99% - 99%)    PHYSICAL EXAM:  GENERAL: NAD, lying in bed comfortably  HEAD:  Atraumatic, Normocephalic  EYES: conjunctiva and sclera clear  ENT: Moist mucous membranes  NECK: Supple, No JVD  CHEST/LUNG: Clear to auscultation bilaterally  HEART: Regular rate and rhythm; No murmurs, rubs, or gallops  ABDOMEN: Soft, nontender, nondistended  EXTREMITIES:  No clubbing, cyanosis, or edema, dressing noted on left foot with 2 + edema around the ankle joint   NERVOUS SYSTEM:  A&Ox3, following commands, moving all extremities     LABS:                        9.9    5.70  )-----------( 318      ( 27 Sep 2023 07:41 )             32.5   09-27    140  |  104  |  22<H>  ----------------------------<  85  4.6   |  26  |  1.4    Ca    9.3      27 Sep 2023 07:41  Mg     2.1     09-27    TPro  6.1  /  Alb  3.4<L>  /  TBili  0.3  /  DBili  x   /  AST  15  /  ALT  17  /  AlkPhos  100  09-27    RADIOLOGY:  < from: VA Duplex Lower Extrem Arterial, Bilat (09.26.23 @ 15:10) >    Impression:    Moderate right posterior tibial and peroneal artery stenosis.    Normal arterial flow in the left lower extremity.    A/P  #  DFU/ PVD/ DM  - consulted by podiatry, debridement vs amputation planned on Friday   - ID recommended  Levofloxacin 750mg Q 24 hours   - Weight Bearing Status; WBAT with Heel Touch w/ Surgical Shoe  - pt was consulted by vascular surgery, no intervention recommended   - diabetes is well controlled   - carb consistent diet   - monitor finger stick   - start SS    # CKD stage 3a  - avoid nephrotoxic agent  - monitor BUN/cr and urine output     # HTN  - DASH/ carb consistent diet   - on losartan     # GI/DVT prophylaxis.

## 2023-09-26 NOTE — H&P ADULT - NSHPLABSRESULTS_GEN_ALL_CORE
10.6   6.63  )-----------( 349      ( 26 Sep 2023 11:43 )             35.2     09-26    140  |  102  |  27<H>  ----------------------------<  112<H>  4.4   |  28  |  1.5    Ca    9.4      26 Sep 2023 11:43    TPro  7.0  /  Alb  4.2  /  TBili  0.3  /  DBili  x   /  AST  18  /  ALT  21  /  AlkPhos  115  09-26          Urinalysis Basic - ( 26 Sep 2023 11:43 )    Color: x / Appearance: x / SG: x / pH: x  Gluc: 112 mg/dL / Ketone: x  / Bili: x / Urobili: x   Blood: x / Protein: x / Nitrite: x   Leuk Esterase: x / RBC: x / WBC x   Sq Epi: x / Non Sq Epi: x / Bacteria: x      PT/INR - ( 26 Sep 2023 11:43 )   PT: 12.90 sec;   INR: 1.13 ratio         PTT - ( 26 Sep 2023 11:43 )  PTT:37.2 sec  Lactate Trend        CAPILLARY BLOOD GLUCOSE          Culture Results:   Numerous Pseudomonas aeruginosa (09-17 @ 15:43)

## 2023-09-26 NOTE — ED PROVIDER NOTE - OBJECTIVE STATEMENT
53-year-old male with PMH HTN, DM, Hx right toe amputation presenting from podiatry office for left foot ulcer, sent in by Dr. Fisher for admission for debridement.  Patient reports he developed ulcer of the left second toe just last week and has been seen in the ED and by podiatry multiple times and notes that the ulcer has been worsening rapidly.  At last ED visit had culture done which grew Pseudomonas, was started on Levaquin which she has been compliant with.  Patient denies recent fever, drainage from foot

## 2023-09-26 NOTE — CHART NOTE - NSCHARTNOTEFT_GEN_A_CORE
53 yr/o male wiht PMH of Diabetes, Hypertension who was seen bedside in the ED today for complaint of worsening L 2nd toe infection.     L Gangrenous Toe - 2nd digit -suspected OM  Microcytic anemia        XR Imaging  Foot consistent with OM of 2nd L toe  Get Lower Extremity Arterial Duplex B/L  Get ESR/CRP & ID Consult  Follow Up with Wound Culture. prior wound cx form sep 17 growing pseudomonas sensitive to cipro  Pt received one dose of levo & cefepime in ER. Continue Levofloxacin for now. Will leave it to ID whether or not want to hold off abx for surgical yield cx  Weight Bearing Status; WBAT with Heel Touch w/ Surgical Shoe  A1c 4.4 02/2023. Not on any meds  Get Anemia work up  Possible CKD stage 3- at baseline  Continue home meds including losartan , gabapentin  Patient scheduled for surgery Friday 9/29  for Excisional Debridement of Soft Tissue and Bone with 2nd digit amputation, L foot.

## 2023-09-26 NOTE — ED PROVIDER NOTE - CLINICAL SUMMARY MEDICAL DECISION MAKING FREE TEXT BOX
53-year-old man history of diabetes presenting here for diabetic foot ulcer.  Patient was seen in the ED September 17 where he was evaluated by podiatry wound culture grew positive for Pseudomonas followed up with his podiatrist Dr. Fisher who sent him in to the ED as he might require surgery for his diabetic foot ulcer.  Patient states has been compliant with his levofloxacin no fevers or chills  vs reviewed labs imaging obtained and reviewed, podiatry consulted patient admitted for DFU.

## 2023-09-26 NOTE — ED ADULT TRIAGE NOTE - CHIEF COMPLAINT QUOTE
pt sent in by Dr Fisher for left diabetic foot ulcer. Pt was seen in ED last week but states it was getting worse, went to PMD today and was told he might need surgery

## 2023-09-26 NOTE — CHART NOTE - NSCHARTNOTEFT_GEN_A_CORE
Patient scheduled for surgery Friday 9/29 at 12:30pm - Excisional Debridement of Soft Tissue and Bone with 2nd digit amputation, L foot.   NPO MN thurs 9/28  medical clearance please

## 2023-09-26 NOTE — ED PROVIDER NOTE - PHYSICAL EXAMINATION
CONSTITUTIONAL: Well-developed; well-nourished; in no acute distress.   SKIN: Warm, dry  HEAD: Normocephalic; atraumatic  EYES: EOMI, normal sclera and conjunctiva   ENT: No nasal discharge; airway clear.  CARD:  Regular rate and rhythm. Normal S1, S2. No LE edema.  RESP: No increased WOB. CTA b/l without wheezes, crackles, rhonchi  ABD: Normoactive BS. Soft, nontender, nondistended.  EXT: Left 2nd toe plantar erosion with exposed tendon. Left 3rd toe medial skin breakdown. No purulent drainage  NEURO: Alert, oriented, grossly unremarkable  PSYCH: Cooperative, appropriate.

## 2023-09-26 NOTE — ED ADULT NURSE NOTE - CHIEF COMPLAINT QUOTE
pt sent in by Dr Fisher for left diabetic foot ulcer. Pt was seen in ED last week but states it was getting worse, went to PMD today and was told he might need surgery [[email or web address]] : LE Venous US ordered today to r/o DVT,  reveals negative DVT bilaterally. \par

## 2023-09-26 NOTE — ED PROVIDER NOTE - ATTENDING CONTRIBUTION TO CARE
53-year-old man history of diabetes presenting here for diabetic foot ulcer.  Patient was seen in the ED September 17 where he was evaluated by podiatry wound culture grew positive for Pseudomonas followed up with his podiatrist Dr. Fisher who sent him in to the ED as he might require surgery for his diabetic foot ulcer.  Patient states has been compliant with his levofloxacin no fevers or chills CONSTITUTIONAL: WA / WN / NAD  HEAD: NCAT  EYES: PERRL; EOMI;   ENT: Normal pharynx; mucous membranes pink/moist, no erythema.  NECK: Supple;   MSK/EXT: +exposed bone on toe of 2nd and 3rd on the left. pulses in tact  SKIN: Warm and dry;   NEURO: AAOx3,  PSYCH: Memory Intact, Normal Affect

## 2023-09-26 NOTE — H&P ADULT - NSHPREVIEWOFSYSTEMS_GEN_ALL_CORE
CONSTITUTIONAL: No weakness, fevers or chills  EYES/ENT: No visual changes;  No vertigo or throat pain   NECK: No pain or stiffness  RESPIRATORY: No cough, wheezing, hemoptysis; No shortness of breath  CARDIOVASCULAR: No chest pain or palpitations  GASTROINTESTINAL: No abdominal or epigastric pain. No nausea, vomiting, or hematemesis; No diarrhea or constipation. No melena or hematochezia.  GENITOURINARY: No dysuria, frequency or hematuria  NEUROLOGICAL: No numbness or weakness  SKIN: No itching, rashes CONSTITUTIONAL: No weakness, fevers or chills  EYES/ENT: No visual changes;  No vertigo or throat pain   NECK: No pain or stiffness  RESPIRATORY: No cough, wheezing, hemoptysis; No shortness of breath  CARDIOVASCULAR: No chest pain or palpitations  GASTROINTESTINAL: No abdominal or epigastric pain. No nausea, vomiting, or hematemesis; No diarrhea or constipation. No melena or hematochezia.  GENITOURINARY: No dysuria, frequency or hematuria  NEUROLOGICAL: No numbness or weakness  SKIN: Left second toe wound, amputation of the right second toe

## 2023-09-26 NOTE — ED ADULT NURSE NOTE - NSFALLUNIVINTERV_ED_ALL_ED
Bed/Stretcher in lowest position, wheels locked, appropriate side rails in place/Call bell, personal items and telephone in reach/Instruct patient to call for assistance before getting out of bed/chair/stretcher/Non-slip footwear applied when patient is off stretcher/[place] to call system/Physically safe environment - no spills, clutter or unnecessary equipment/Purposeful proactive rounding/Room/bathroom lighting operational, light cord in reach

## 2023-09-26 NOTE — H&P ADULT - HISTORY OF PRESENT ILLNESS
53 yr/o male who was seen bedside in the ED today for complaint of worsening L 2nd toe infection. Patient presented to ED a week ago where he noticed sloughing of skin and drainage from the L 2nd toe. At that time, the wound did not probe to bone or show any other concern of infection and was prescribed PO bactrim and keflex. Patient followed up in Dr. Fisher outpatient clinic yesterday and was told to return to ED for surgical intervention.  Patient states he has been dressing the wound daily with betadine, gauze and kerlix.      Vital Signs Last 24 Hrs  T(C): 36.7 (26 Sep 2023 15:20), Max: 36.8 (26 Sep 2023 10:35)  T(F): 98 (26 Sep 2023 15:20), Max: 98.2 (26 Sep 2023 10:35)  HR: 90 (26 Sep 2023 15:20) (90 - 102)  BP: 144/87 (26 Sep 2023 15:20) (144/87 - 165/82)  RR: 18 (26 Sep 2023 15:20) (18 - 18)  SpO2: 100% (26 Sep 2023 15:20) (100% - 100%)  O2 Parameters below as of 26 Sep 2023 10:35  Patient On (Oxygen Delivery Method): room air          53 yr/o male with PMH of DM and HTN who was seen bedside in the ED today for complaint of worsening L 2nd toe infection. As per patient he noticed a small ulcer/wound on the left second toe 2 weeks ago, which was growing in size with foul smelling drainage. He said that he had amputation of the right second toe with Dr Fisher before. He was seen by Podiatry team in OP clinic and was sent to ED for debridement which is scheduled to be on Friday.   Patient presented to ED a week ago where he noticed sloughing of skin and drainage from the L 2nd toe. At that time, the wound did not probe to bone or show any other concern of infection and was prescribed PO bactrim and keflex. Patient followed up in Dr. Fisher outpatient clinic yesterday and was told to return to ED for surgical intervention.  Patient states he has been dressing the wound daily with betadine, gauze and Kerlix He denied any fevers, chills, N/V, Headaches, diarrhea constipation.     Vital Signs Last 24 Hrs  T(C): 36.7 (26 Sep 2023 15:20), Max: 36.8 (26 Sep 2023 10:35)  T(F): 98 (26 Sep 2023 15:20), Max: 98.2 (26 Sep 2023 10:35)  HR: 90 (26 Sep 2023 15:20) (90 - 102)  BP: 144/87 (26 Sep 2023 15:20) (144/87 - 165/82)  RR: 18 (26 Sep 2023 15:20) (18 - 18)  SpO2: 100% (26 Sep 2023 15:20) (100% - 100%)  O2 Parameters below as of 26 Sep 2023 10:35  Patient On (Oxygen Delivery Method): room air

## 2023-09-27 LAB
A1C WITH ESTIMATED AVERAGE GLUCOSE RESULT: 4.8 % — SIGNIFICANT CHANGE UP (ref 4–5.6)
ALBUMIN SERPL ELPH-MCNC: 3.4 G/DL — LOW (ref 3.5–5.2)
ALP SERPL-CCNC: 100 U/L — SIGNIFICANT CHANGE UP (ref 30–115)
ALT FLD-CCNC: 17 U/L — SIGNIFICANT CHANGE UP (ref 0–41)
ANION GAP SERPL CALC-SCNC: 10 MMOL/L — SIGNIFICANT CHANGE UP (ref 7–14)
APTT BLD: 35.7 SEC — SIGNIFICANT CHANGE UP (ref 27–39.2)
AST SERPL-CCNC: 15 U/L — SIGNIFICANT CHANGE UP (ref 0–41)
BASOPHILS # BLD AUTO: 0.04 K/UL — SIGNIFICANT CHANGE UP (ref 0–0.2)
BASOPHILS NFR BLD AUTO: 0.7 % — SIGNIFICANT CHANGE UP (ref 0–1)
BILIRUB SERPL-MCNC: 0.3 MG/DL — SIGNIFICANT CHANGE UP (ref 0.2–1.2)
BUN SERPL-MCNC: 22 MG/DL — HIGH (ref 10–20)
CALCIUM SERPL-MCNC: 9.3 MG/DL — SIGNIFICANT CHANGE UP (ref 8.4–10.4)
CHLORIDE SERPL-SCNC: 104 MMOL/L — SIGNIFICANT CHANGE UP (ref 98–110)
CHOLEST SERPL-MCNC: 101 MG/DL — SIGNIFICANT CHANGE UP
CO2 SERPL-SCNC: 26 MMOL/L — SIGNIFICANT CHANGE UP (ref 17–32)
CREAT SERPL-MCNC: 1.4 MG/DL — SIGNIFICANT CHANGE UP (ref 0.7–1.5)
CRP SERPL-MCNC: 26.3 MG/L — HIGH
EGFR: 60 ML/MIN/1.73M2 — SIGNIFICANT CHANGE UP
EOSINOPHIL # BLD AUTO: 0.12 K/UL — SIGNIFICANT CHANGE UP (ref 0–0.7)
EOSINOPHIL NFR BLD AUTO: 2.1 % — SIGNIFICANT CHANGE UP (ref 0–8)
ERYTHROCYTE [SEDIMENTATION RATE] IN BLOOD: 47 MM/HR — HIGH (ref 0–10)
ESTIMATED AVERAGE GLUCOSE: 91 MG/DL — SIGNIFICANT CHANGE UP (ref 68–114)
FERRITIN SERPL-MCNC: 243 NG/ML — SIGNIFICANT CHANGE UP (ref 30–400)
GLUCOSE BLDC GLUCOMTR-MCNC: 100 MG/DL — HIGH (ref 70–99)
GLUCOSE BLDC GLUCOMTR-MCNC: 103 MG/DL — HIGH (ref 70–99)
GLUCOSE BLDC GLUCOMTR-MCNC: 110 MG/DL — HIGH (ref 70–99)
GLUCOSE BLDC GLUCOMTR-MCNC: 125 MG/DL — HIGH (ref 70–99)
GLUCOSE SERPL-MCNC: 85 MG/DL — SIGNIFICANT CHANGE UP (ref 70–99)
GRAM STN FLD: SIGNIFICANT CHANGE UP
HCT VFR BLD CALC: 32.5 % — LOW (ref 42–52)
HDLC SERPL-MCNC: 29 MG/DL — LOW
HGB BLD-MCNC: 9.9 G/DL — LOW (ref 14–18)
IMM GRANULOCYTES NFR BLD AUTO: 0.4 % — HIGH (ref 0.1–0.3)
INR BLD: 1.23 RATIO — SIGNIFICANT CHANGE UP (ref 0.65–1.3)
IRON SATN MFR SERPL: 27 % — SIGNIFICANT CHANGE UP (ref 15–50)
IRON SATN MFR SERPL: 59 UG/DL — SIGNIFICANT CHANGE UP (ref 35–150)
LIPID PNL WITH DIRECT LDL SERPL: 61 MG/DL — SIGNIFICANT CHANGE UP
LYMPHOCYTES # BLD AUTO: 1.31 K/UL — SIGNIFICANT CHANGE UP (ref 1.2–3.4)
LYMPHOCYTES # BLD AUTO: 23 % — SIGNIFICANT CHANGE UP (ref 20.5–51.1)
MAGNESIUM SERPL-MCNC: 2.1 MG/DL — SIGNIFICANT CHANGE UP (ref 1.8–2.4)
MCHC RBC-ENTMCNC: 23.1 PG — LOW (ref 27–31)
MCHC RBC-ENTMCNC: 30.5 G/DL — LOW (ref 32–37)
MCV RBC AUTO: 75.8 FL — LOW (ref 80–94)
MONOCYTES # BLD AUTO: 0.64 K/UL — HIGH (ref 0.1–0.6)
MONOCYTES NFR BLD AUTO: 11.2 % — HIGH (ref 1.7–9.3)
NEUTROPHILS # BLD AUTO: 3.57 K/UL — SIGNIFICANT CHANGE UP (ref 1.4–6.5)
NEUTROPHILS NFR BLD AUTO: 62.6 % — SIGNIFICANT CHANGE UP (ref 42.2–75.2)
NON HDL CHOLESTEROL: 72 MG/DL — SIGNIFICANT CHANGE UP
NRBC # BLD: 0 /100 WBCS — SIGNIFICANT CHANGE UP (ref 0–0)
PLATELET # BLD AUTO: 318 K/UL — SIGNIFICANT CHANGE UP (ref 130–400)
PMV BLD: 8.9 FL — SIGNIFICANT CHANGE UP (ref 7.4–10.4)
POTASSIUM SERPL-MCNC: 4.6 MMOL/L — SIGNIFICANT CHANGE UP (ref 3.5–5)
POTASSIUM SERPL-SCNC: 4.6 MMOL/L — SIGNIFICANT CHANGE UP (ref 3.5–5)
PROT SERPL-MCNC: 6.1 G/DL — SIGNIFICANT CHANGE UP (ref 6–8)
PROTHROM AB SERPL-ACNC: 14.1 SEC — HIGH (ref 9.95–12.87)
RBC # BLD: 4.29 M/UL — LOW (ref 4.7–6.1)
RBC # FLD: 12 % — SIGNIFICANT CHANGE UP (ref 11.5–14.5)
SODIUM SERPL-SCNC: 140 MMOL/L — SIGNIFICANT CHANGE UP (ref 135–146)
SPECIMEN SOURCE: SIGNIFICANT CHANGE UP
TIBC SERPL-MCNC: 218 UG/DL — LOW (ref 220–430)
TRANSFERRIN SERPL-MCNC: 187 MG/DL — LOW (ref 200–360)
TRIGL SERPL-MCNC: 53 MG/DL — SIGNIFICANT CHANGE UP
UIBC SERPL-MCNC: 159 UG/DL — SIGNIFICANT CHANGE UP (ref 110–370)
WBC # BLD: 5.7 K/UL — SIGNIFICANT CHANGE UP (ref 4.8–10.8)
WBC # FLD AUTO: 5.7 K/UL — SIGNIFICANT CHANGE UP (ref 4.8–10.8)

## 2023-09-27 PROCEDURE — 99232 SBSQ HOSP IP/OBS MODERATE 35: CPT

## 2023-09-27 PROCEDURE — 93010 ELECTROCARDIOGRAM REPORT: CPT

## 2023-09-27 RX ORDER — INSULIN LISPRO 100/ML
VIAL (ML) SUBCUTANEOUS
Refills: 0 | Status: DISCONTINUED | OUTPATIENT
Start: 2023-09-27 | End: 2023-09-29

## 2023-09-27 RX ORDER — GUAIFENESIN/DEXTROMETHORPHAN 600MG-30MG
20 TABLET, EXTENDED RELEASE 12 HR ORAL EVERY 8 HOURS
Refills: 0 | Status: DISCONTINUED | OUTPATIENT
Start: 2023-09-27 | End: 2023-09-29

## 2023-09-27 RX ORDER — DEXTROSE 50 % IN WATER 50 %
15 SYRINGE (ML) INTRAVENOUS ONCE
Refills: 0 | Status: DISCONTINUED | OUTPATIENT
Start: 2023-09-27 | End: 2023-09-29

## 2023-09-27 RX ORDER — DEXTROSE 50 % IN WATER 50 %
25 SYRINGE (ML) INTRAVENOUS ONCE
Refills: 0 | Status: DISCONTINUED | OUTPATIENT
Start: 2023-09-27 | End: 2023-09-29

## 2023-09-27 RX ORDER — DEXTROSE 50 % IN WATER 50 %
12.5 SYRINGE (ML) INTRAVENOUS ONCE
Refills: 0 | Status: DISCONTINUED | OUTPATIENT
Start: 2023-09-27 | End: 2023-09-29

## 2023-09-27 RX ORDER — GLUCAGON INJECTION, SOLUTION 0.5 MG/.1ML
1 INJECTION, SOLUTION SUBCUTANEOUS ONCE
Refills: 0 | Status: DISCONTINUED | OUTPATIENT
Start: 2023-09-27 | End: 2023-09-29

## 2023-09-27 RX ORDER — SODIUM CHLORIDE 9 MG/ML
1000 INJECTION, SOLUTION INTRAVENOUS
Refills: 0 | Status: DISCONTINUED | OUTPATIENT
Start: 2023-09-27 | End: 2023-09-29

## 2023-09-27 RX ADMIN — LOSARTAN POTASSIUM 50 MILLIGRAM(S): 100 TABLET, FILM COATED ORAL at 05:24

## 2023-09-27 RX ADMIN — Medication 20 MILLILITER(S): at 21:10

## 2023-09-27 RX ADMIN — CHLORHEXIDINE GLUCONATE 1 APPLICATION(S): 213 SOLUTION TOPICAL at 05:25

## 2023-09-27 NOTE — PROGRESS NOTE ADULT - SUBJECTIVE AND OBJECTIVE BOX
MAUREEN CASTAÑEDA 53y Male  MRN#: [id number]   CODE STATUS:    Hospital Day: 1d   53 yr/o male with PMH of DM admitted for L 2nd toe wound infection/osteomyelitis. infection  SUBJECTIVE  Patient was seen and examined at bedside. Patient reports no complaints.                                               ----------------------------------------------------------  OBJECTIVE  PAST MEDICAL & SURGICAL HISTORY  Diabetes    Hypertension    History of penile disorder    Gallstones    History of eye surgery    History of penile implant    H/O foot surgery                                              -----------------------------------------------------------  ALLERGIES:  No Known Allergies                                            ------------------------------------------------------------    HOME MEDICATIONS  Home Medications:  gabapentin 300 mg oral capsule: 1 cap(s) orally once a day as needed for tingling (26 Sep 2023 17:30)  losartan-hydrochlorothiazide 50 mg-12.5 mg oral tablet: 1 tab(s) orally once a day (26 Sep 2023 17:32)  losartan-hydrochlorothiazide 50 mg-12.5 mg oral tablet: 1 tab(s) orally once a day (26 Sep 2023 17:29)  metFORMIN 750 mg oral tablet, extended release: 1 tab(s) orally once a day (26 Sep 2023 17:30)                           MEDICATIONS:  STANDING MEDICATIONS  chlorhexidine 2% Cloths 1 Application(s) Topical <User Schedule>  dextrose 5%. 1000 milliLiter(s) IV Continuous <Continuous>  dextrose 5%. 1000 milliLiter(s) IV Continuous <Continuous>  dextrose 50% Injectable 25 Gram(s) IV Push once  dextrose 50% Injectable 12.5 Gram(s) IV Push once  dextrose 50% Injectable 25 Gram(s) IV Push once  gabapentin 300 milliGRAM(s) Oral every 8 hours  glucagon  Injectable 1 milliGRAM(s) IntraMuscular once  heparin   Injectable 5000 Unit(s) SubCutaneous every 8 hours  insulin lispro (ADMELOG) corrective regimen sliding scale   SubCutaneous three times a day before meals  levoFLOXacin IVPB      losartan 50 milliGRAM(s) Oral daily  pantoprazole    Tablet 40 milliGRAM(s) Oral before breakfast    PRN MEDICATIONS  acetaminophen     Tablet .. 650 milliGRAM(s) Oral every 6 hours PRN  aluminum hydroxide/magnesium hydroxide/simethicone Suspension 30 milliLiter(s) Oral every 4 hours PRN  dextrose Oral Gel 15 Gram(s) Oral once PRN  melatonin 3 milliGRAM(s) Oral at bedtime PRN  oxyCODONE    IR 5 milliGRAM(s) Oral every 6 hours PRN                                            ------------------------------------------------------------  VITAL SIGNS: Last 24 Hours  T(C): 35.7 (27 Sep 2023 13:36), Max: 36.7 (26 Sep 2023 15:20)  T(F): 96.3 (27 Sep 2023 13:36), Max: 98 (26 Sep 2023 15:20)  HR: 80 (27 Sep 2023 13:36) (80 - 90)  BP: 146/83 (27 Sep 2023 13:36) (144/87 - 158/88)  BP(mean): --  RR: 18 (27 Sep 2023 13:36) (17 - 18)  SpO2: 99% (26 Sep 2023 22:01) (99% - 100%)                                             --------------------------------------------------------------  LABS:                        9.9    5.70  )-----------( 318      ( 27 Sep 2023 07:41 )             32.5     09-27    140  |  104  |  22<H>  ----------------------------<  85  4.6   |  26  |  1.4    Ca    9.3      27 Sep 2023 07:41  Mg     2.1     09-27    TPro  6.1  /  Alb  3.4<L>  /  TBili  0.3  /  DBili  x   /  AST  15  /  ALT  17  /  AlkPhos  100  09-27    PT/INR - ( 27 Sep 2023 07:41 )   PT: 14.10 sec;   INR: 1.23 ratio         PTT - ( 27 Sep 2023 07:41 )  PTT:35.7 sec  Urinalysis Basic - ( 27 Sep 2023 07:41 )    Color: x / Appearance: x / SG: x / pH: x  Gluc: 85 mg/dL / Ketone: x  / Bili: x / Urobili: x   Blood: x / Protein: x / Nitrite: x   Leuk Esterase: x / RBC: x / WBC x   Sq Epi: x / Non Sq Epi: x / Bacteria: x        Sedimentation Rate, Erythrocyte: 47 mm/Hr *H* (09-27-23 @ 07:41)  Sedimentation Rate, Erythrocyte: 65 mm/Hr *H* (09-26-23 @ 16:46)        Culture - Abscess with Gram Stain (collected 26 Sep 2023 14:01)  Source: .Abscess medial aspect L third digit  Gram Stain (27 Sep 2023 05:00):    No polymorphonuclear cells seen per low power field    Moderate Gram Negative Rods seen per oil power field              PHYSICAL EXAM:  General: well-appearing in NAD. AAO x 3.  HEENT: Normocephalic, nontraumatic.   LUNGS: Clear to auscultation b/l.   HEART: RRR. S1/S2 present.  ABDOMEN: Nontender, nondistended. + bowel sounds.   EXT: Pulses palpable. Mildly Edematous left LE.   SKIN: Warm, dry.                                                                                       MAUREEN CASTAÑEDA 53y Male  MRN#: [id number]   CODE STATUS:    Hospital Day: 1d   53 yr/o male with PMH of DM admitted for L 2nd toe wound infection/osteomyelitis  SUBJECTIVE  Patient was seen and examined at bedside. Patient reports no complaints.                                               ----------------------------------------------------------  OBJECTIVE  PAST MEDICAL & SURGICAL HISTORY  Diabetes    Hypertension    History of penile disorder    Gallstones    History of eye surgery    History of penile implant    H/O foot surgery                                              -----------------------------------------------------------  ALLERGIES:  No Known Allergies                                            ------------------------------------------------------------    HOME MEDICATIONS  Home Medications:  gabapentin 300 mg oral capsule: 1 cap(s) orally once a day as needed for tingling (26 Sep 2023 17:30)  losartan-hydrochlorothiazide 50 mg-12.5 mg oral tablet: 1 tab(s) orally once a day (26 Sep 2023 17:32)  losartan-hydrochlorothiazide 50 mg-12.5 mg oral tablet: 1 tab(s) orally once a day (26 Sep 2023 17:29)  metFORMIN 750 mg oral tablet, extended release: 1 tab(s) orally once a day (26 Sep 2023 17:30)                           MEDICATIONS:  STANDING MEDICATIONS  chlorhexidine 2% Cloths 1 Application(s) Topical <User Schedule>  dextrose 5%. 1000 milliLiter(s) IV Continuous <Continuous>  dextrose 5%. 1000 milliLiter(s) IV Continuous <Continuous>  dextrose 50% Injectable 25 Gram(s) IV Push once  dextrose 50% Injectable 12.5 Gram(s) IV Push once  dextrose 50% Injectable 25 Gram(s) IV Push once  gabapentin 300 milliGRAM(s) Oral every 8 hours  glucagon  Injectable 1 milliGRAM(s) IntraMuscular once  heparin   Injectable 5000 Unit(s) SubCutaneous every 8 hours  insulin lispro (ADMELOG) corrective regimen sliding scale   SubCutaneous three times a day before meals  levoFLOXacin IVPB      losartan 50 milliGRAM(s) Oral daily  pantoprazole    Tablet 40 milliGRAM(s) Oral before breakfast    PRN MEDICATIONS  acetaminophen     Tablet .. 650 milliGRAM(s) Oral every 6 hours PRN  aluminum hydroxide/magnesium hydroxide/simethicone Suspension 30 milliLiter(s) Oral every 4 hours PRN  dextrose Oral Gel 15 Gram(s) Oral once PRN  melatonin 3 milliGRAM(s) Oral at bedtime PRN  oxyCODONE    IR 5 milliGRAM(s) Oral every 6 hours PRN                                            ------------------------------------------------------------  VITAL SIGNS: Last 24 Hours  T(C): 35.7 (27 Sep 2023 13:36), Max: 36.7 (26 Sep 2023 15:20)  T(F): 96.3 (27 Sep 2023 13:36), Max: 98 (26 Sep 2023 15:20)  HR: 80 (27 Sep 2023 13:36) (80 - 90)  BP: 146/83 (27 Sep 2023 13:36) (144/87 - 158/88)  BP(mean): --  RR: 18 (27 Sep 2023 13:36) (17 - 18)  SpO2: 99% (26 Sep 2023 22:01) (99% - 100%)                                             --------------------------------------------------------------  LABS:                        9.9    5.70  )-----------( 318      ( 27 Sep 2023 07:41 )             32.5     09-27    140  |  104  |  22<H>  ----------------------------<  85  4.6   |  26  |  1.4    Ca    9.3      27 Sep 2023 07:41  Mg     2.1     09-27    TPro  6.1  /  Alb  3.4<L>  /  TBili  0.3  /  DBili  x   /  AST  15  /  ALT  17  /  AlkPhos  100  09-27    PT/INR - ( 27 Sep 2023 07:41 )   PT: 14.10 sec;   INR: 1.23 ratio         PTT - ( 27 Sep 2023 07:41 )  PTT:35.7 sec  Urinalysis Basic - ( 27 Sep 2023 07:41 )    Color: x / Appearance: x / SG: x / pH: x  Gluc: 85 mg/dL / Ketone: x  / Bili: x / Urobili: x   Blood: x / Protein: x / Nitrite: x   Leuk Esterase: x / RBC: x / WBC x   Sq Epi: x / Non Sq Epi: x / Bacteria: x        Sedimentation Rate, Erythrocyte: 47 mm/Hr *H* (09-27-23 @ 07:41)  Sedimentation Rate, Erythrocyte: 65 mm/Hr *H* (09-26-23 @ 16:46)        Culture - Abscess with Gram Stain (collected 26 Sep 2023 14:01)  Source: .Abscess medial aspect L third digit  Gram Stain (27 Sep 2023 05:00):    No polymorphonuclear cells seen per low power field    Moderate Gram Negative Rods seen per oil power field              PHYSICAL EXAM:  General: well-appearing in NAD. AAO x 3.  HEENT: Normocephalic, nontraumatic.   LUNGS: Clear to auscultation b/l.   HEART: RRR. S1/S2 present.  ABDOMEN: Nontender, nondistended. + bowel sounds.   EXT: Pulses palpable. Mildly Edematous left LE.   SKIN: Warm, dry.

## 2023-09-27 NOTE — PROGRESS NOTE ADULT - ASSESSMENT
53 yr/o male with PMH of DM and HTN who was seen bedside in the ED today for complaint of worsening L 2nd toe infection. As per patient he noticed a small ulcer/wound on the left second toe 2 weeks ago, which was growing in size with foul smelling drainage. He said that he had amputation of the right second toe with Dr Fisher before. He was seen by Podiatry team in OP clinic and was sent to ED for debridement which is scheduled to be on Friday.   Patient presented to ED a week ago where he noticed sloughing of skin and drainage from the L 2nd toe. At that time, the wound did not probe to bone or show any other concern of infection and was prescribed PO bactrim and keflex. Patient followed up in Dr. Fisher outpatient clinic yesterday and was told to return to ED for surgical intervention.    As per patient he only takes Metformin( non-complaint) and Losartan- HCTZ and Gabapentin at home    # Left 2nd toe wound  - XR Imaging  Foot consistent with OM of 2nd L toe  - f/u Wound Culture pending  - prior wound cx form sep 17 growing pseudomonas sensitive to cipro  - Local Wound Care done by podiatry   - Weight Bearing Status; WBAT w/ Heel Touch w/ Surgical Shoe;   - f/u Lower Extremity Arterial Duplex B/L;   - f/u ESR/CRP;   - f/u ID Consult;    - scheduled for surgery on Friday as per patient - Excisional Debridement of Soft Tissue and Bone with 2nd digit amputation, L foot.   - Podiatry requesting Medical clearance   - s/p  one dose of levo & cefepime in ER.   - Continue Levofloxacin for now.   - Weight Bearing Status; WBAT with Heel Touch w/ Surgical Shoe  - Possible CKD stage 3- at baseline?  - NPO MN thurs 9/28  - EKG  - MRSA swab    # CKD stage 3a  - avoid nephrotoxic agent    # HTN  - on losartan     # DM  - as per patient he is no Metformin 750 mg QD but doesn't take it     #DVT PPx- heparin sub q  #GI PPx-protonix  #Diet- DASH  #Activity- WBAT  #Dispo- Med- Sug   53 yr/o male with PMH of DM and HTN who was seen bedside in the ED today for complaint of worsening L 2nd toe infection. As per patient he noticed a small ulcer/wound on the left second toe 2 weeks ago, which was growing in size with foul smelling drainage. He said that he had amputation of the right second toe with Dr Fisher before. He was seen by Podiatry team in OP clinic and was sent to ED for debridement which is scheduled to be on Friday.   Patient presented to ED a week ago where he noticed sloughing of skin and drainage from the L 2nd toe. At that time, the wound did not probe to bone or show any other concern of infection and was prescribed PO bactrim and keflex. Patient followed up in Dr. Fisher outpatient clinic yesterday and was told to return to ED for surgical intervention.    As per patient he only takes Metformin( non-complaint) and Losartan- HCTZ and Gabapentin at home    # Left 2nd toe wound  - XR Imaging  Foot consistent with OM of 2nd L toe  - f/u Wound Culture pending  - prior wound cx form sep 17 growing pseudomonas sensitive to cipro  - Local Wound Care done by podiatry   - Weight Bearing Status; WBAT w/ Heel Touch w/ Surgical Shoe;   - ESR: 65; CRP: 38.4   - scheduled for surgery on Friday as per patient - Excisional Debridement of Soft Tissue and Bone with 2nd digit amputation, L foot.   - Podiatry requesting Medical clearance   - s/p one dose of levo & cefepime in ER.   - Continue Levofloxacin 750mg per ID.   - Weight Bearing Status; WBAT with Heel Touch w/ Surgical Shoe  - Possible CKD stage 3- at baseline?  - NPO MN thurs 9/28  - EKG  - MRSA swab  - Lower Extremity Arterial Duplex B/l - negative     # CKD stage 3a  - avoid nephrotoxic agent    # HTN  - on losartan     # DM  - Sliding scale  - Carb consistent diet    #DVT PPx- heparin sub q  #GI PPx-protonix  #Activity- WBAT  #Dispo- Med- Sug

## 2023-09-28 LAB
-  AMIKACIN: SIGNIFICANT CHANGE UP
-  AZTREONAM: SIGNIFICANT CHANGE UP
-  CEFEPIME: SIGNIFICANT CHANGE UP
-  CEFTAZIDIME: SIGNIFICANT CHANGE UP
-  CIPROFLOXACIN: SIGNIFICANT CHANGE UP
-  GENTAMICIN: SIGNIFICANT CHANGE UP
-  IMIPENEM: SIGNIFICANT CHANGE UP
-  LEVOFLOXACIN: SIGNIFICANT CHANGE UP
-  MEROPENEM: SIGNIFICANT CHANGE UP
-  PIPERACILLIN/TAZOBACTAM: SIGNIFICANT CHANGE UP
-  TOBRAMYCIN: SIGNIFICANT CHANGE UP
APTT BLD: 35.6 SEC — SIGNIFICANT CHANGE UP (ref 27–39.2)
GLUCOSE BLDC GLUCOMTR-MCNC: 101 MG/DL — HIGH (ref 70–99)
GLUCOSE BLDC GLUCOMTR-MCNC: 112 MG/DL — HIGH (ref 70–99)
GLUCOSE BLDC GLUCOMTR-MCNC: 119 MG/DL — HIGH (ref 70–99)
GLUCOSE BLDC GLUCOMTR-MCNC: 151 MG/DL — HIGH (ref 70–99)
HCT VFR BLD CALC: 31.9 % — LOW (ref 42–52)
HGB BLD-MCNC: 9.8 G/DL — LOW (ref 14–18)
INR BLD: 1.12 RATIO — SIGNIFICANT CHANGE UP (ref 0.65–1.3)
MCHC RBC-ENTMCNC: 23.4 PG — LOW (ref 27–31)
MCHC RBC-ENTMCNC: 30.7 G/DL — LOW (ref 32–37)
MCV RBC AUTO: 76.3 FL — LOW (ref 80–94)
METHOD TYPE: SIGNIFICANT CHANGE UP
MRSA PCR RESULT.: NEGATIVE — SIGNIFICANT CHANGE UP
NRBC # BLD: 0 /100 WBCS — SIGNIFICANT CHANGE UP (ref 0–0)
PLATELET # BLD AUTO: 329 K/UL — SIGNIFICANT CHANGE UP (ref 130–400)
PMV BLD: 9.1 FL — SIGNIFICANT CHANGE UP (ref 7.4–10.4)
PROTHROM AB SERPL-ACNC: 12.8 SEC — SIGNIFICANT CHANGE UP (ref 9.95–12.87)
RAPID RVP RESULT: SIGNIFICANT CHANGE UP
RBC # BLD: 4.18 M/UL — LOW (ref 4.7–6.1)
RBC # FLD: 11.9 % — SIGNIFICANT CHANGE UP (ref 11.5–14.5)
SARS-COV-2 RNA SPEC QL NAA+PROBE: SIGNIFICANT CHANGE UP
WBC # BLD: 5.95 K/UL — SIGNIFICANT CHANGE UP (ref 4.8–10.8)
WBC # FLD AUTO: 5.95 K/UL — SIGNIFICANT CHANGE UP (ref 4.8–10.8)

## 2023-09-28 PROCEDURE — 99232 SBSQ HOSP IP/OBS MODERATE 35: CPT

## 2023-09-28 RX ADMIN — Medication 2: at 18:00

## 2023-09-28 RX ADMIN — LOSARTAN POTASSIUM 50 MILLIGRAM(S): 100 TABLET, FILM COATED ORAL at 06:57

## 2023-09-28 RX ADMIN — CHLORHEXIDINE GLUCONATE 1 APPLICATION(S): 213 SOLUTION TOPICAL at 06:57

## 2023-09-28 NOTE — DIETITIAN INITIAL EVALUATION ADULT - EDUCATION DIETARY MODIFICATIONS
Discussed importance of PO intake and current diet order./(1) partially meets; needs review/practice/verbalization

## 2023-09-28 NOTE — DIETITIAN INITIAL EVALUATION ADULT - OTHER CALCULATIONS
Using ABW: ENERGY: 8130-2354 kcal/day (15-20 kcal/kg); PROTEIN:  g/day (1-1.2 g/kg); FLUID: 2438 mL/day (25 mL/kg) -- with consideration for age, BMI

## 2023-09-28 NOTE — CONSULT NOTE ADULT - SUBJECTIVE AND OBJECTIVE BOX
Podiatry Consult Note    Subjective:  MAUREEN CASTAÑEDA is a 53 yr/o male who was seen bedside in the ED today for complaint of worsening L 2nd toe infection. Patient presented to ED a week ago where he noticed sloughing of skin and drainage from the L 2nd toe. At that time, the wound did not probe to bone or show any other concern of infection and was prescribed PO bactrim and keflex. Patient followed up in Dr. Fsiher outpatient clinic yesterday and was told to return to ED for surgical intervention.  Patient states he has been dressing the wound daily with betadine, gauze and kerlix.     PAST MEDICAL & SURGICAL HISTORY:  Diabetes  Hypertension  History of penile disorder  Gallstones  History of eye surgery  History of penile implant  H/O foot surgery         Review of Systems:  [X] Ten point review of systems is otherwise negative except as noted     Objective:  Vital Signs Last 24 Hrs  T(C): 36.8 (26 Sep 2023 10:35), Max: 36.8 (26 Sep 2023 10:35)  T(F): 98.2 (26 Sep 2023 10:35), Max: 98.2 (26 Sep 2023 10:35)  HR: 102 (26 Sep 2023 10:35) (102 - 102)  BP: 165/82 (26 Sep 2023 10:35) (165/82 - 165/82)  BP(mean): --  RR: 18 (26 Sep 2023 10:35) (18 - 18)  SpO2: 100% (26 Sep 2023 10:35) (100% - 100%)    Parameters below as of 26 Sep 2023 10:35  Patient On (Oxygen Delivery Method): room air                            10.6   6.63  )-----------( 349      ( 26 Sep 2023 11:43 )             35.2                           Physical Exam - Lower Extremity Focused:   Derm: Full thickness ulcer noted circumferentially around the L 2nd digit with drainage, malodor and bone exposure. Proximal phalanx exposed with dorsiflexion of the toe. Partial thickness ulceration on the medial aspect of the L 3rd digit with 50:50 fibrogranular wound base. L 2nd and 3rd toes with darkened discoloration. Edema noted throughout the L foot.   Vascular: DP and PT Pulses Diminished; Foot is Warm to Warm to the touch; Capillary Refill Time < 3 Seconds;    Neuro: Protective Sensation Diminished / Moderately Neuropathic   MSK: No pain On Palpation at Wound Site     Assessment:  L Gangrenous Toe - 2nd digit   L foot Infection    Plan:  Chart reviewed and Patient evaluated. All Questions and Concerns Addressed and Answered  XR Imaging  Foot; Pending Results  Wound Culture Obtained; Sent to Microbiology; Pending Results   Local Wound Care; Wound Flushed w/ NS; Wound Packed w/ Betadine Soaked Gauze / DSD / Kerlix   Weight Bearing Status; WBAT w/ Heel Touch w/ Surgical Shoe;   Recommend; Lower Extremity Arterial Duplex B/L; ESR/CRP;   Request ID Consult;  / Follow Up w/ Wound Culture  Patient to be scheduled for surgery - Excisional Debridement of Soft Tissue and Bone with 2nd digit amputation, L foot.   Medical clearance please  Discussed Plan w/ Dr. Fisher    Podiatry 
VASCULAR SURGERY CONSULT NOTE      HPI:   53 yr/o male with PMH of DM and HTN who was seen bedside in the ED today for complaint of worsening L 2nd toe infection. As per patient he noticed a small ulcer/wound on the left second toe 2 weeks ago, which was growing in size with foul smelling drainage. He said that he had amputation of the right second toe with Dr Fisher before. He was seen by Podiatry team in OP clinic and was sent to ED for debridement which is scheduled to be on Friday.   Patient presented to ED a week ago where he noticed sloughing of skin and drainage from the L 2nd toe. At that time, the wound did not probe to bone or show any other concern of infection and was prescribed PO bactrim and keflex. Patient followed up in Dr. Fisher outpatient clinic yesterday and was told to return to ED for surgical intervention.  Patient states he has been dressing the wound daily with betadine, gauze and Kerlix He denied any fevers, chills, N/V, Headaches, diarrhea constipation.     Vital Signs Last 24 Hrs  T(C): 36.7 (26 Sep 2023 15:20), Max: 36.8 (26 Sep 2023 10:35)  T(F): 98 (26 Sep 2023 15:20), Max: 98.2 (26 Sep 2023 10:35)  HR: 90 (26 Sep 2023 15:20) (90 - 102)  BP: 144/87 (26 Sep 2023 15:20) (144/87 - 165/82)  RR: 18 (26 Sep 2023 15:20) (18 - 18)  SpO2: 100% (26 Sep 2023 15:20) (100% - 100%)  O2 Parameters below as of 26 Sep 2023 10:35  Patient On (Oxygen Delivery Method): room air         (26 Sep 2023 16:14)        PAST MEDICAL & SURGICAL HISTORY:  Diabetes      Hypertension      History of penile disorder      Gallstones      History of eye surgery      History of penile implant      H/O foot surgery        No Known Allergies    Home Medications:  gabapentin 300 mg oral capsule: 1 cap(s) orally once a day as needed for tingling (26 Sep 2023 17:30)  losartan-hydrochlorothiazide 50 mg-12.5 mg oral tablet: 1 tab(s) orally once a day (26 Sep 2023 17:32)  losartan-hydrochlorothiazide 50 mg-12.5 mg oral tablet: 1 tab(s) orally once a day (26 Sep 2023 17:29)  metFORMIN 750 mg oral tablet, extended release: 1 tab(s) orally once a day (26 Sep 2023 17:30)    No permtinent family history of PVD    REVIEW OF SYSTEMS:  GENERAL:                                         negative  SKIN:                                               see HPI  OPTHALMOLOGIC:                          negative  ENMT:                                               negative  RESPIRATORY AND THORAX:        negative  CARDIOVASCULAR:                        see HPI  GASTROINTESTINAL:                       negative  NEPHROLOGY:                                  negative  MUSCULOSKELETAL:                       negative  NEUROLOGIC:                                   negative  PSYCHIATRIC:                                    negative  HEMATOLOGY/LYMPHATICS:         negative  ENDOCRINE:                                     see HPI  ALLERGIC/IMMUNOLOGIC:            negative    12 point ROS otherwise normal except as stated in HPI  FHx: none  SHX:  [ ]  smoking     [ ] alcohol use    PHYSICAL EXAM  Vital Signs Last 24 Hrs  T(C): 36.1 (28 Sep 2023 13:50), Max: 36.6 (27 Sep 2023 19:46)  T(F): 97 (28 Sep 2023 13:50), Max: 97.9 (27 Sep 2023 19:46)  HR: 90 (28 Sep 2023 13:50) (76 - 90)  BP: 142/79 (28 Sep 2023 13:50) (142/79 - 150/77)  BP(mean): --  RR: 18 (28 Sep 2023 13:50) (18 - 18)  SpO2: --        Appearance: Normal	  HEENT:   Normal oral mucosa, PERRL, EOMI	  Neck: Supple, - JVD;   Cardiovascular: Normal S1 S2, No JVD, No murmurs,   Respiratory: Lungs clear to auscultation, No Rales, Rhonchi, Wheezing	  Gastrointestinal:  Soft, Non-tender, positive BS	  Skin: No rashes, No ecchymoses, No cyanosis  Extremities: Normal range of motion, No clubbing, cyanosis or edema  left foot:  Full thickness ulcer noted circumferentially around the L 2nd digit with drainage, malodor and bone exposure. Proximal phalanx exposed with dorsiflexion of the toe. Partial thickness ulceration on the medial aspect of the L 3rd digit with 50:50 fibrogranular wound base. L 2nd and 3rd toes with darkened discoloration. Edema noted throughout the L foot.   Neurologic: Non-focal  Psychiatry: A & O x 3, Mood & affect appropriate      PULSES:  Femoral:  Popliteal:  Dorsal Pedal: palpable b/l  Posterior Tibial: palpable b/l  Capillary:    MEDICATIONS:   MEDICATIONS  (STANDING):  chlorhexidine 2% Cloths 1 Application(s) Topical <User Schedule>  dextrose 5%. 1000 milliLiter(s) (50 mL/Hr) IV Continuous <Continuous>  dextrose 5%. 1000 milliLiter(s) (100 mL/Hr) IV Continuous <Continuous>  dextrose 50% Injectable 12.5 Gram(s) IV Push once  dextrose 50% Injectable 25 Gram(s) IV Push once  dextrose 50% Injectable 25 Gram(s) IV Push once  gabapentin 300 milliGRAM(s) Oral every 8 hours  glucagon  Injectable 1 milliGRAM(s) IntraMuscular once  heparin   Injectable 5000 Unit(s) SubCutaneous every 8 hours  insulin lispro (ADMELOG) corrective regimen sliding scale   SubCutaneous three times a day before meals  levoFLOXacin IVPB      levoFLOXacin IVPB 750 milliGRAM(s) IV Intermittent every 24 hours  losartan 50 milliGRAM(s) Oral daily  pantoprazole    Tablet 40 milliGRAM(s) Oral before breakfast    MEDICATIONS  (PRN):  acetaminophen     Tablet .. 650 milliGRAM(s) Oral every 6 hours PRN Temp greater or equal to 38C (100.4F), Mild Pain (1 - 3), Moderate Pain (4 - 6)  aluminum hydroxide/magnesium hydroxide/simethicone Suspension 30 milliLiter(s) Oral every 4 hours PRN Dyspepsia  dextrose Oral Gel 15 Gram(s) Oral once PRN Blood Glucose LESS THAN 70 milliGRAM(s)/deciliter  guaifenesin/dextromethorphan Oral Liquid 20 milliLiter(s) Oral every 8 hours PRN Cough  melatonin 3 milliGRAM(s) Oral at bedtime PRN Insomnia  oxyCODONE    IR 5 milliGRAM(s) Oral every 6 hours PRN Severe Pain (7 - 10)      LAB/STUDIES:                        9.9    5.70  )-----------( 318      ( 27 Sep 2023 07:41 )             32.5     09-27    140  |  104  |  22<H>  ----------------------------<  85  4.6   |  26  |  1.4    Ca    9.3      27 Sep 2023 07:41  Mg     2.1     09-27    TPro  6.1  /  Alb  3.4<L>  /  TBili  0.3  /  DBili  x   /  AST  15  /  ALT  17  /  AlkPhos  100  09-27    PT/INR - ( 27 Sep 2023 07:41 )   PT: 14.10 sec;   INR: 1.23 ratio         PTT - ( 27 Sep 2023 07:41 )  PTT:35.7 sec  LIVER FUNCTIONS - ( 27 Sep 2023 07:41 )  Alb: 3.4 g/dL / Pro: 6.1 g/dL / ALK PHOS: 100 U/L / ALT: 17 U/L / AST: 15 U/L / GGT: x             Urinalysis Basic - ( 27 Sep 2023 07:41 )    Color: x / Appearance: x / SG: x / pH: x  Gluc: 85 mg/dL / Ketone: x  / Bili: x / Urobili: x   Blood: x / Protein: x / Nitrite: x   Leuk Esterase: x / RBC: x / WBC x   Sq Epi: x / Non Sq Epi: x / Bacteria: x                  Culture - Abscess with Gram Stain (collected 26 Sep 2023 14:01)  Source: .Abscess medial aspect L third digit  Gram Stain (27 Sep 2023 05:00):    No polymorphonuclear cells seen per low power field    Moderate Gram Negative Rods seen per oil power field  Preliminary Report (27 Sep 2023 21:04):    Numerous Pseudomonas aeruginosa        IMAGING:  VA Duplex Lower Extrem Arterial, Bilat (09.26.23 @ 15:10) >  Moderate right posterior tibial and peroneal artery stenosis.    Normal arterial flow in the left lower extremity.          
MAUREEN CASTAÑEDA  53y, Male  Allergy: No Known Allergies      CHIEF COMPLAINT: Foot (26 Sep 2023 16:14)      LOS  1d    HPI:   53 yr/o male with PMH of DM and HTN who was seen bedside in the ED today for complaint of worsening L 2nd toe infection. As per patient he noticed a small ulcer/wound on the left second toe 2 weeks ago, which was growing in size with foul smelling drainage. He said that he had amputation of the right second toe with Dr Fisher before. He was seen by Podiatry team in OP clinic and was sent to ED for debridement which is scheduled to be on Friday.   Patient presented to ED a week ago where he noticed sloughing of skin and drainage from the L 2nd toe. At that time, the wound did not probe to bone or show any other concern of infection and was prescribed PO bactrim and keflex. Patient followed up in Dr. Fisher outpatient clinic yesterday and was told to return to ED for surgical intervention.  Patient states he has been dressing the wound daily with betadine, gauze and Kerlix He denied any fevers, chills, N/V, Headaches, diarrhea constipation.     Vital Signs Last 24 Hrs  T(C): 36.7 (26 Sep 2023 15:20), Max: 36.8 (26 Sep 2023 10:35)  T(F): 98 (26 Sep 2023 15:20), Max: 98.2 (26 Sep 2023 10:35)  HR: 90 (26 Sep 2023 15:20) (90 - 102)  BP: 144/87 (26 Sep 2023 15:20) (144/87 - 165/82)  RR: 18 (26 Sep 2023 15:20) (18 - 18)  SpO2: 100% (26 Sep 2023 15:20) (100% - 100%)  O2 Parameters below as of 26 Sep 2023 10:35  Patient On (Oxygen Delivery Method): room air         (26 Sep 2023 16:14)      INFECTIOUS DISEASE HISTORY:  History as above.    PAST MEDICAL & SURGICAL HISTORY:  Diabetes      Hypertension      History of penile disorder      Gallstones      History of eye surgery      History of penile implant      H/O foot surgery          FAMILY HISTORY  No pertinent family history in first degree relatives    No pertinent family history in first degree relatives    Family history of diabetes mellitus (DM) (Mother)        SOCIAL HISTORY  Social History:  Denies smoking, drugs and etoh (21 Oct 2022 04:18)        ROS  General: Denies rigors, nightsweats  HEENT: Denies headache, rhinorrhea, sore throat, eye pain  CV: Denies CP, palpitations  PULM: Denies wheezing, hemoptysis  GI: Denies hematemesis, hematochezia, melena  : Denies discharge, hematuria  MSK: Denies arthralgias, myalgias  SKIN: Denies rash, lesions  NEURO: Denies paresthesias, weakness  PSYCH: Denies depression, anxiety    VITALS:  T(F): 97.2, Max: 98 (09-26-23 @ 15:20)  HR: 82  BP: 158/88  RR: 18Vital Signs Last 24 Hrs  T(C): 36.2 (27 Sep 2023 04:59), Max: 36.7 (26 Sep 2023 15:20)  T(F): 97.2 (27 Sep 2023 04:59), Max: 98 (26 Sep 2023 15:20)  HR: 82 (27 Sep 2023 04:59) (80 - 90)  BP: 158/88 (27 Sep 2023 04:59) (144/87 - 158/88)  BP(mean): --  RR: 18 (27 Sep 2023 04:59) (17 - 18)  SpO2: 99% (26 Sep 2023 22:01) (99% - 100%)    Parameters below as of 26 Sep 2023 22:01  Patient On (Oxygen Delivery Method): room air        PHYSICAL EXAM:  Gen: NAD, resting in bed  HEENT: Normocephalic, atraumatic  Neck: supple, no lymphadenopathy  CV: Regular rate & regular rhythm  Lungs: decreased BS at bases, no fremitus  Abdomen: Soft, BS present  Ext: Warm, well perfused  Neuro: non focal, awake  Skin: left 2nd toe with exposed bone -- abutting 3rd toe margin with ulcer as well   Lines: no phlebitis    TESTS & MEASUREMENTS:                        9.9    5.70  )-----------( 318      ( 27 Sep 2023 07:41 )             32.5     09-27    140  |  104  |  22<H>  ----------------------------<  85  4.6   |  26  |  1.4    Ca    9.3      27 Sep 2023 07:41  Mg     2.1     09-27    TPro  6.1  /  Alb  3.4<L>  /  TBili  0.3  /  DBili  x   /  AST  15  /  ALT  17  /  AlkPhos  100  09-27      LIVER FUNCTIONS - ( 27 Sep 2023 07:41 )  Alb: 3.4 g/dL / Pro: 6.1 g/dL / ALK PHOS: 100 U/L / ALT: 17 U/L / AST: 15 U/L / GGT: x           Urinalysis Basic - ( 27 Sep 2023 07:41 )    Color: x / Appearance: x / SG: x / pH: x  Gluc: 85 mg/dL / Ketone: x  / Bili: x / Urobili: x   Blood: x / Protein: x / Nitrite: x   Leuk Esterase: x / RBC: x / WBC x   Sq Epi: x / Non Sq Epi: x / Bacteria: x        Culture - Abscess with Gram Stain (collected 09-26-23 @ 14:01)  Source: .Abscess medial aspect L third digit  Gram Stain (09-27-23 @ 05:00):    No polymorphonuclear cells seen per low power field    Moderate Gram Negative Rods seen per oil power field    Culture - Abscess with Gram Stain (collected 09-17-23 @ 15:43)  Source: .Abscess L 2nd digit ulcer  Gram Stain (09-18-23 @ 04:20):    Few polymorphonuclear leukocytes seen per low power field    Moderate Gram Negative Rods seen per oil power field  Final Report (09-22-23 @ 16:03):    Numerous Pseudomonas aeruginosa  Organism: Pseudomonas aeruginosa (09-22-23 @ 16:03)  Organism: Pseudomonas aeruginosa (09-22-23 @ 16:03)      -  Levofloxacin: S <=0.5      -  Tobramycin: S <=2      -  Aztreonam: S <=4      -  Gentamicin: S 4      -  Cefepime: S <=2      -  Piperacillin/Tazobactam: S <=8      -  Ciprofloxacin: S <=0.25      -  Imipenem: S <=1      Method Type: KATHLEEN      -  Meropenem: S <=1      -  Ceftazidime: I 16      -  Amikacin: S <=16    Culture - Tissue with Gram Stain (collected 10-25-22 @ 17:39)  Source: .Tissue None  Gram Stain (10-25-22 @ 23:10):    No polymorphonuclear cells seen per low power field    No organisms seen  Final Report (10-30-22 @ 19:21):    No growth at 5 days    Culture - Blood (collected 10-20-22 @ 23:35)  Source: .Blood Blood-Peripheral  Final Report (10-26-22 @ 07:00):    No Growth Final    Culture - Blood (collected 10-20-22 @ 23:35)  Source: .Blood Blood-Peripheral  Final Report (10-26-22 @ 07:00):    No Growth Final            INFECTIOUS DISEASES TESTING  COVID-19 PCR: NotDetec (10-27-22 @ 04:30)  MRSA PCR Result.: Negative (10-26-22 @ 09:05)      RADIOLOGY & ADDITIONAL TESTS:  I have personally reviewed the last Chest xray  CXR      CT      CARDIOLOGY TESTING      MEDICATIONS  chlorhexidine 2% Cloths 1 Topical <User Schedule>  dextrose 5%. 1000 IV Continuous <Continuous>  dextrose 5%. 1000 IV Continuous <Continuous>  dextrose 50% Injectable 12.5 IV Push once  dextrose 50% Injectable 25 IV Push once  dextrose 50% Injectable 25 IV Push once  gabapentin 300 Oral every 8 hours  glucagon  Injectable 1 IntraMuscular once  heparin   Injectable 5000 SubCutaneous every 8 hours  insulin lispro (ADMELOG) corrective regimen sliding scale  SubCutaneous three times a day before meals  levoFLOXacin IVPB 500 IV Intermittent every 24 hours  losartan 50 Oral daily  pantoprazole    Tablet 40 Oral before breakfast      Weight  Weight (kg): 97.5 (09-26-23 @ 10:35)    ANTIBIOTICS:  levoFLOXacin IVPB 500 milliGRAM(s) IV Intermittent every 24 hours      ALLERGIES:  No Known Allergies

## 2023-09-28 NOTE — DIETITIAN INITIAL EVALUATION ADULT - ADD RECOMMEND
1. Continue with current diet order   2. Encourage PO intake     Pt is at low nutrition risk; will f/u in 7-10 days or prn. Consent (Temporal Branch)/Introductory Paragraph: The rationale for Mohs was explained to the patient and consent was obtained. The risks, benefits and alternatives to therapy were discussed in detail. Specifically, the risks of damage to the temporal branch of the facial nerve, infection, scarring, pain, bleeding, prolonged wound healing, incomplete removal, allergy to anesthesia, nerve injury, recurrence and the possible need of delayed or additional reconstruction were addressed.  Prior to the procedure, the treatment site was clearly identified and confirmed by the patient. All components of Universal Protocol/PAUSE Rule completed.

## 2023-09-28 NOTE — DIETITIAN INITIAL EVALUATION ADULT - NAME AND PHONE
Estrella [id number] or TEAMS    Nutrition Monitoring: Diet order, PO intake, weights, labs, NFPF, body composition, BM and tolerance to medical food supplements

## 2023-09-28 NOTE — DIETITIAN INITIAL EVALUATION ADULT - OTHER INFO
Pertinent Medical Information: Per H&P, pt is a  Pertinent Medical Information: Per H&P, pt is a 54 y/o male with PMHx of DM and HTN who was seen bedside in the ED today for complaint of worsening L 2nd toe infection.     Pertinent Subjective Information: Pt reports good appetite; consuming >75% of meals provided in-house. Tolerating diet texture/consistency well. No nausea or vomiting reported.     Weight hx: #/97.7 KG -- checked about 1 month ago per pt. Pt states "if there is any weight loss, it was intentional." Current dosing weight is 97.5 KG. Weight stable in 1 month compared to current dosing weight. Pt does not meet weight criteria for malnutrition at this time.

## 2023-09-28 NOTE — DIETITIAN INITIAL EVALUATION ADULT - ORAL INTAKE PTA/DIET HISTORY
Pt reports good appetite prior to admit; consumes 2 big meals daily. Pt takes a multivitamin, zinc and magnesium daily. Denies drinking protein shake supplements. NKFA; denies any restrictive cultural or [employer] food preferences. No chewing or swallowing difficulties noted with regular texture food.

## 2023-09-28 NOTE — PROGRESS NOTE ADULT - ATTENDING COMMENTS
Pt was seen and examined at bed side, pt c/o left foot ulcer, awaiting for OR on Friday.   I agree with medical resident's assessment and plan above with a few correction in my note.    A/P    53 yr/o male with PMH of DM and HTN who was seen bedside in the ED today for complaint of worsening L 2nd toe infection. As per patient he noticed a small ulcer/wound on the left second toe 2 weeks ago, which was growing in size with foul smelling drainage. He said that he had amputation of the right second toe with Dr Fisher before. He was seen by Podiatry team in OP clinic and was sent to ED for debridement which is scheduled to be on Friday.   Patient presented to ED a week ago where he noticed sloughing of skin and drainage from the L 2nd toe. At that time, the wound did not probe to bone or show any other concern of infection and was prescribed PO bactrim and keflex. Patient followed up in Dr. Fisher outpatient clinic yesterday and was told to return to ED for surgical intervention.  Patient states he has been dressing the wound daily with betadine, gauze and Kerlix He denied any fevers, chills, N/V, Headaches, diarrhea constipation.     #  DFU/ PVD/ DM  - consulted by podiatry, debridement vs amputation planned on Friday   - keep NPO after midnight   - ID recommended  Levofloxacin 750mg Q 24 hours   - Weight Bearing Status; WBAT with Heel Touch w/ Surgical Shoe  - pt was consulted by vascular surgery, no intervention recommended   - diabetes is well controlled   - carb consistent diet   - monitor finger stick   - start SS    # CKD stage 3a  - avoid nephrotoxic agent  - monitor BUN/cr and urine output     # HTN  - DASH/ carb consistent diet   - on losartan     # GI/DVT prophylaxis.    Pt has a very good functional capacity, no prior history of CAD or recent cardiac events, he has a low to mild risk for low risk Sx

## 2023-09-28 NOTE — CONSULT NOTE ADULT - ASSESSMENT
ASSESSMENT   53 yr/o male with PMH of DM and HTN who was seen bedside in the ED today for complaint of worsening L 2nd toe infection.     IMPRESSION  #2nd Left Toe Ulcer with exposed bone - OM   #3rd Left Toe ulcer - suspicious for OM  - Xray Foot AP + Lateral + Oblique, Left (09.26.23 @ 11:32): Limited study, particularly of the forefoot, cannot properly evaluate for  osteomyelitis. Apparent erosive change of the distal portion of the third  proximal phalanx, compatiblewith osteomyelitis in the appropriate  clinical setting. Correlate with exam and consider MR as warranted.   Hallux valgus deformity. Plantar calcaneal spurring. Vascular  calcifications.  - Sedimentation Rate, Erythrocyte: 65 mm/Hr (09.26.23 @ 16:46)  - C-Reactive Protein, Serum: 26.3 mg/L (09.27.23 @ 07:41)    #DM II  #Abx allergy: No Known Allergies    RECOMMENDATIONS  - planned for second toe amputation and likely debridement of third toe ulcer   - suspect OM of third toe -- will likely plan prolonged course of antibiotics post debridement   - continue levofloxacin -- increase to 750 mg daily for Pseudomonas dosing   - check EKG for qtc     Please call or message on Microsoft Teams if with any questions.  Spectra [id number]    
 53 yr/o male with PMH of DM and HTN who was seen bedside in the ED today for complaint of worsening L 2nd toe infection.     Vascular surgery called to evaluate after art dplx showed moderate stenosis of right PTA and peroneal artery.  Pt has no wounds on right foot  Pulses are palpable    - I reviewed today's labs  - I reviewed and personally visualized all the radiology imagings  - I discussed the plan with attending Dr. Rossi:  - no need for any vascular interventions  - pt should follow up as outpt in 2-3 weeks    SPECTRA [id number]

## 2023-09-28 NOTE — PROGRESS NOTE ADULT - ASSESSMENT
53 yr/o male with PMH of DM and HTN who was seen bedside in the ED today for complaint of worsening L 2nd toe infection. He was seen by Podiatry team in OP clinic and was sent to ED for debridement which is scheduled to be on Friday.      # Left 2nd toe wound   - XR Imaging  Foot consistent with OM of 2nd L toe   - Wound Culture now: Numerous Pseudomonas aeruginosa  - prior wound cx form sep 17 growing pseudomonas sensitive to cipro   - Local Wound Care done by podiatry    - Weight Bearing Status; WBAT w/ Heel Touch w/ Surgical Shoe;    - ESR: 65; CRP: 38.4    - scheduled for surgery on Friday - Excisional Debridement of Soft Tissue and Bone with 2nd digit amputation, L foot.    - Podiatry requesting Medical clearance: Chart note to be added    - s/p one dose of levo & cefepime in ER.    - Continue Levofloxacin 750mg per ID.    - Weight Bearing Status; WBAT with Heel Touch w/ Surgical Shoe   - NPO MN thurs 9/28   - MRSA swab: -ve   - Lower Extremity Arterial Duplex B/l - Moderate right posterior tibial and peroneal artery stenosis. Normal arterial flow in the left lower extremity.  - Vascular consulted for the above finding:   - no need for any vascular interventions  - pt should follow up as outpt in 2-3 weeks  - Dacro shoe ordered    # CKD stage 3a (1.4 baseline Cr)  - avoid nephrotoxic agent     # HTN   - on losartan      # DM   - Sliding scale   - Carb consistent diet      #DVT PPx- heparin sub q     #GI PPx-protonix     #Activity- WBAT     #Dispo- Anticipate DC in 48H            53 yr/o male with PMH of DM and HTN who was seen bedside in the ED today for complaint of worsening L 2nd toe infection. He was seen by Podiatry team in OP clinic and was sent to ED for debridement which is scheduled to be on Friday.      # Left 2nd toe wound   - XR Imaging  Foot consistent with OM of 2nd L toe   - Wound Culture now: Numerous Pseudomonas aeruginosa  - prior wound cx form sep 17 growing pseudomonas sensitive to cipro   - Local Wound Care done by podiatry    - Weight Bearing Status; WBAT w/ Heel Touch w/ Surgical Shoe;    - ESR: 65; CRP: 38.4    - scheduled for surgery on Friday - Excisional Debridement of Soft Tissue and Bone with 2nd digit amputation, L foot.    - Podiatry requesting Medical clearance: Chart note to be added    - s/p one dose of levo & cefepime in ER.    - Continue Levofloxacin 750mg per ID.    - Weight Bearing Status; WBAT with Heel Touch w/ Surgical Shoe   - NPO MN thurs 9/28   - MRSA swab: -ve   - Lower Extremity Arterial Duplex B/l - Moderate right posterior tibial and peroneal artery stenosis. Normal arterial flow in the left lower extremity.  - Vascular consulted for the above finding:   - no need for any vascular interventions  - pt should follow up as outpt in 2-3 weeks  - Darco shoe ordered    # CKD stage 3a (1.4 baseline Cr)  - avoid nephrotoxic agent     # HTN   - on losartan      # DM   - Sliding scale   - Carb consistent diet      #DVT PPx- heparin sub q     #GI PPx-protonix     #Activity- WBAT     #Dispo- Anticipate DC in 48H

## 2023-09-28 NOTE — PROGRESS NOTE ADULT - SUBJECTIVE AND OBJECTIVE BOX
Subjective:    Today is hospital day 2d. This morning patient was seen and examined at bedside, resting comfortably in bed.    No acute or major events overnight. Patient denies any complaints.    PAST MEDICAL & SURGICAL HISTORY  Diabetes    Hypertension    History of penile disorder    Gallstones    History of eye surgery    History of penile implant    H/O foot surgery    SOCIAL HISTORY:  Social History:    ALLERGIES:  No Known Allergies    MEDICATIONS:  STANDING MEDICATIONS  chlorhexidine 2% Cloths 1 Application(s) Topical <User Schedule>  dextrose 5%. 1000 milliLiter(s) IV Continuous <Continuous>  dextrose 5%. 1000 milliLiter(s) IV Continuous <Continuous>  dextrose 50% Injectable 12.5 Gram(s) IV Push once  dextrose 50% Injectable 25 Gram(s) IV Push once  dextrose 50% Injectable 25 Gram(s) IV Push once  gabapentin 300 milliGRAM(s) Oral every 8 hours  glucagon  Injectable 1 milliGRAM(s) IntraMuscular once  heparin   Injectable 5000 Unit(s) SubCutaneous every 8 hours  insulin lispro (ADMELOG) corrective regimen sliding scale   SubCutaneous three times a day before meals  levoFLOXacin IVPB      levoFLOXacin IVPB 750 milliGRAM(s) IV Intermittent every 24 hours  losartan 50 milliGRAM(s) Oral daily  pantoprazole    Tablet 40 milliGRAM(s) Oral before breakfast    PRN MEDICATIONS  acetaminophen     Tablet .. 650 milliGRAM(s) Oral every 6 hours PRN  aluminum hydroxide/magnesium hydroxide/simethicone Suspension 30 milliLiter(s) Oral every 4 hours PRN  dextrose Oral Gel 15 Gram(s) Oral once PRN  guaifenesin/dextromethorphan Oral Liquid 20 milliLiter(s) Oral every 8 hours PRN  melatonin 3 milliGRAM(s) Oral at bedtime PRN  oxyCODONE    IR 5 milliGRAM(s) Oral every 6 hours PRN    Objective:  VITALS:   T(F): 97  HR: 90  BP: 142/79  RR: 18      PHYSICAL EXAM:  Appearance: NAD	   Cardiovascular: Normal S1 S2  Respiratory: Lungs clear to auscultation	  Gastrointestinal:  Soft, Non-tender, positive BS	  Extremities: per Vascular exam  left foot:  Full thickness ulcer noted circumferentially around the L 2nd digit with drainage, malodor and bone exposure. Proximal phalanx exposed with dorsiflexion of the toe.   Partial thickness ulceration on the medial aspect of the L 3rd digit with 50:50 fibrogranular wound base. L 2nd and 3rd toes with darkened discoloration. Edema noted throughout the L foot.   Neurologic: Non-focal        LABS:                        9.9    5.70  )-----------( 318      ( 27 Sep 2023 07:41 )             32.5     09-27    140  |  104  |  22<H>  ----------------------------<  85  4.6   |  26  |  1.4    Ca    9.3      27 Sep 2023 07:41  Mg     2.1     09-27    TPro  6.1  /  Alb  3.4<L>  /  TBili  0.3  /  DBili  x   /  AST  15  /  ALT  17  /  AlkPhos  100  09-27    PT/INR - ( 27 Sep 2023 07:41 )   PT: 14.10 sec;   INR: 1.23 ratio         PTT - ( 27 Sep 2023 07:41 )  PTT:35.7 sec    Culture - Abscess with Gram Stain (collected 26 Sep 2023 14:01)  Source: .Abscess medial aspect L third digit  Gram Stain (27 Sep 2023 05:00):    No polymorphonuclear cells seen per low power field    Moderate Gram Negative Rods seen per oil power field  Preliminary Report (27 Sep 2023 21:04):    Numerous Pseudomonas aeruginosa

## 2023-09-28 NOTE — DIETITIAN INITIAL EVALUATION ADULT - REASON
AM Assessment. Pt. A/O to person and place. Respirations even and unlabored. Expiratory wheezing noted. Pt. Extremely dyspneic with exertion. Generalized weakness noted. Patient denies pain at this time. Medications crushed and given with applesauce, pt. Tolerated well. Stated she can get her pills down easier when crushed. No other complaints at this time. Call light within reach. Bed alarm on, will monitor hourly. No overt s/s of malnutrition at this time

## 2023-09-28 NOTE — DIETITIAN INITIAL EVALUATION ADULT - PERTINENT LABORATORY DATA
09-27    140  |  104  |  22<H>  ----------------------------<  85  4.6   |  26  |  1.4    Ca    9.3      27 Sep 2023 07:41  Mg     2.1     09-27    TPro  6.1  /  Alb  3.4<L>  /  TBili  0.3  /  DBili  x   /  AST  15  /  ALT  17  /  AlkPhos  100  09-27  POCT Blood Glucose.: 119 mg/dL (09-28-23 @ 11:09)  A1C with Estimated Average Glucose Result: 4.8 % (09-27-23 @ 07:41)  A1C with Estimated Average Glucose Result: 5.0 % (09-26-23 @ 16:46)  A1C with Estimated Average Glucose Result: 4.3 % (02-23-23 @ 11:00)

## 2023-09-28 NOTE — DIETITIAN INITIAL EVALUATION ADULT - PERTINENT MEDS FT
MEDICATIONS  (STANDING):  chlorhexidine 2% Cloths 1 Application(s) Topical <User Schedule>  dextrose 5%. 1000 milliLiter(s) (50 mL/Hr) IV Continuous <Continuous>  dextrose 5%. 1000 milliLiter(s) (100 mL/Hr) IV Continuous <Continuous>  dextrose 50% Injectable 12.5 Gram(s) IV Push once  dextrose 50% Injectable 25 Gram(s) IV Push once  dextrose 50% Injectable 25 Gram(s) IV Push once  gabapentin 300 milliGRAM(s) Oral every 8 hours  glucagon  Injectable 1 milliGRAM(s) IntraMuscular once  heparin   Injectable 5000 Unit(s) SubCutaneous every 8 hours  insulin lispro (ADMELOG) corrective regimen sliding scale   SubCutaneous three times a day before meals  levoFLOXacin IVPB      levoFLOXacin IVPB 750 milliGRAM(s) IV Intermittent every 24 hours  losartan 50 milliGRAM(s) Oral daily  pantoprazole    Tablet 40 milliGRAM(s) Oral before breakfast    MEDICATIONS  (PRN):  acetaminophen     Tablet .. 650 milliGRAM(s) Oral every 6 hours PRN Temp greater or equal to 38C (100.4F), Mild Pain (1 - 3), Moderate Pain (4 - 6)  aluminum hydroxide/magnesium hydroxide/simethicone Suspension 30 milliLiter(s) Oral every 4 hours PRN Dyspepsia  dextrose Oral Gel 15 Gram(s) Oral once PRN Blood Glucose LESS THAN 70 milliGRAM(s)/deciliter  guaifenesin/dextromethorphan Oral Liquid 20 milliLiter(s) Oral every 8 hours PRN Cough  melatonin 3 milliGRAM(s) Oral at bedtime PRN Insomnia  oxyCODONE    IR 5 milliGRAM(s) Oral every 6 hours PRN Severe Pain (7 - 10)

## 2023-09-28 NOTE — DIETITIAN INITIAL EVALUATION ADULT - NS FNS DIET ORDER
Diet, NPO after Midnight:      NPO Start Date: 28-Sep-2023,   NPO Start Time: 23:59 (09-28-23 @ 12:47)   Diet, NPO after Midnight:      NPO Start Date: 28-Sep-2023,   NPO Start Time: 23:59 (09-28-23 @ 12:47) [Active]  Diet, Consistent Carbohydrate/No Snacks (09-27-23 @ 11:15) [Active]

## 2023-09-28 NOTE — DIETITIAN INITIAL EVALUATION ADULT - PATIENT MEETS CRITERIA FOR MALNUTRITION
Vaccine Information Statement(s) or the Emergency Use Authorization was given today. This has been reviewed, questions answered, and verbal consent given by Parent for injection(s) and administration of COVID-19 Immunization Pfizer COVID-19 and Bexsero.      Patient tolerated without incident. See immunization grid for documentation.         no

## 2023-09-29 ENCOUNTER — RESULT REVIEW (OUTPATIENT)
Age: 53
End: 2023-09-29
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
## 2023-09-29 DIAGNOSIS — L97.519 NON-PRESSURE CHRONIC ULCER OF OTHER PART OF RIGHT FOOT WITH UNSPECIFIED SEVERITY: ICD-10-CM
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
## 2023-09-29 PROBLEM — E11.8 DIABETES MELLITUS WITH COMPLICATION: Status: ACTIVE | Noted: 2017-03-23

## 2023-09-29 LAB
ALBUMIN SERPL ELPH-MCNC: 3.6 G/DL — SIGNIFICANT CHANGE UP (ref 3.5–5.2)
ALP SERPL-CCNC: 109 U/L — SIGNIFICANT CHANGE UP (ref 30–115)
ALT FLD-CCNC: 13 U/L — SIGNIFICANT CHANGE UP (ref 0–41)
ANION GAP SERPL CALC-SCNC: 9 MMOL/L — SIGNIFICANT CHANGE UP (ref 7–14)
AST SERPL-CCNC: 13 U/L — SIGNIFICANT CHANGE UP (ref 0–41)
BILIRUB SERPL-MCNC: <0.2 MG/DL — SIGNIFICANT CHANGE UP (ref 0.2–1.2)
BUN SERPL-MCNC: 21 MG/DL — HIGH (ref 10–20)
CALCIUM SERPL-MCNC: 8.7 MG/DL — SIGNIFICANT CHANGE UP (ref 8.4–10.5)
CHLORIDE SERPL-SCNC: 106 MMOL/L — SIGNIFICANT CHANGE UP (ref 98–110)
CO2 SERPL-SCNC: 26 MMOL/L — SIGNIFICANT CHANGE UP (ref 17–32)
CREAT SERPL-MCNC: 1.5 MG/DL — SIGNIFICANT CHANGE UP (ref 0.7–1.5)
EGFR: 55 ML/MIN/1.73M2 — LOW
GLUCOSE BLDC GLUCOMTR-MCNC: 109 MG/DL — HIGH (ref 70–99)
GLUCOSE BLDC GLUCOMTR-MCNC: 111 MG/DL — HIGH (ref 70–99)
GLUCOSE BLDC GLUCOMTR-MCNC: 93 MG/DL — SIGNIFICANT CHANGE UP (ref 70–99)
GLUCOSE SERPL-MCNC: 96 MG/DL — SIGNIFICANT CHANGE UP (ref 70–99)
HCT VFR BLD CALC: 33 % — LOW (ref 42–52)
HGB BLD-MCNC: 10 G/DL — LOW (ref 14–18)
MAGNESIUM SERPL-MCNC: 2.1 MG/DL — SIGNIFICANT CHANGE UP (ref 1.8–2.4)
MCHC RBC-ENTMCNC: 22.9 PG — LOW (ref 27–31)
MCHC RBC-ENTMCNC: 30.3 G/DL — LOW (ref 32–37)
MCV RBC AUTO: 75.5 FL — LOW (ref 80–94)
NRBC # BLD: 0 /100 WBCS — SIGNIFICANT CHANGE UP (ref 0–0)
PLATELET # BLD AUTO: 339 K/UL — SIGNIFICANT CHANGE UP (ref 130–400)
PMV BLD: 8.9 FL — SIGNIFICANT CHANGE UP (ref 7.4–10.4)
POTASSIUM SERPL-MCNC: 4.3 MMOL/L — SIGNIFICANT CHANGE UP (ref 3.5–5)
POTASSIUM SERPL-SCNC: 4.3 MMOL/L — SIGNIFICANT CHANGE UP (ref 3.5–5)
PROT SERPL-MCNC: 6.1 G/DL — SIGNIFICANT CHANGE UP (ref 6–8)
RBC # BLD: 4.37 M/UL — LOW (ref 4.7–6.1)
RBC # FLD: 12 % — SIGNIFICANT CHANGE UP (ref 11.5–14.5)
SODIUM SERPL-SCNC: 141 MMOL/L — SIGNIFICANT CHANGE UP (ref 135–146)
WBC # BLD: 6.25 K/UL — SIGNIFICANT CHANGE UP (ref 4.8–10.8)
WBC # FLD AUTO: 6.25 K/UL — SIGNIFICANT CHANGE UP (ref 4.8–10.8)

## 2023-09-29 PROCEDURE — 73630 X-RAY EXAM OF FOOT: CPT | Mod: 26,LT

## 2023-09-29 PROCEDURE — 99232 SBSQ HOSP IP/OBS MODERATE 35: CPT

## 2023-09-29 PROCEDURE — 88305 TISSUE EXAM BY PATHOLOGIST: CPT | Mod: 26

## 2023-09-29 PROCEDURE — 88304 TISSUE EXAM BY PATHOLOGIST: CPT | Mod: 26

## 2023-09-29 PROCEDURE — 88311 DECALCIFY TISSUE: CPT | Mod: 26

## 2023-09-29 PROCEDURE — 28820 AMPUTATION OF TOE: CPT

## 2023-09-29 RX ORDER — GLUCAGON INJECTION, SOLUTION 0.5 MG/.1ML
1 INJECTION, SOLUTION SUBCUTANEOUS ONCE
Refills: 0 | Status: DISCONTINUED | OUTPATIENT
Start: 2023-09-29 | End: 2023-10-04

## 2023-09-29 RX ORDER — DEXTROSE 50 % IN WATER 50 %
15 SYRINGE (ML) INTRAVENOUS ONCE
Refills: 0 | Status: DISCONTINUED | OUTPATIENT
Start: 2023-09-29 | End: 2023-10-04

## 2023-09-29 RX ORDER — SODIUM CHLORIDE 9 MG/ML
1000 INJECTION, SOLUTION INTRAVENOUS
Refills: 0 | Status: DISCONTINUED | OUTPATIENT
Start: 2023-09-29 | End: 2023-10-04

## 2023-09-29 RX ORDER — OXYCODONE HYDROCHLORIDE 5 MG/1
5 TABLET ORAL EVERY 6 HOURS
Refills: 0 | Status: DISCONTINUED | OUTPATIENT
Start: 2023-09-29 | End: 2023-10-04

## 2023-09-29 RX ORDER — DEXTROSE 50 % IN WATER 50 %
25 SYRINGE (ML) INTRAVENOUS ONCE
Refills: 0 | Status: DISCONTINUED | OUTPATIENT
Start: 2023-09-29 | End: 2023-10-04

## 2023-09-29 RX ORDER — GUAIFENESIN/DEXTROMETHORPHAN 600MG-30MG
20 TABLET, EXTENDED RELEASE 12 HR ORAL EVERY 8 HOURS
Refills: 0 | Status: DISCONTINUED | OUTPATIENT
Start: 2023-09-29 | End: 2023-10-04

## 2023-09-29 RX ORDER — PANTOPRAZOLE SODIUM 20 MG/1
40 TABLET, DELAYED RELEASE ORAL
Refills: 0 | Status: DISCONTINUED | OUTPATIENT
Start: 2023-09-29 | End: 2023-10-04

## 2023-09-29 RX ORDER — LOSARTAN POTASSIUM 100 MG/1
50 TABLET, FILM COATED ORAL DAILY
Refills: 0 | Status: DISCONTINUED | OUTPATIENT
Start: 2023-09-29 | End: 2023-10-04

## 2023-09-29 RX ORDER — ACETAMINOPHEN 500 MG
650 TABLET ORAL EVERY 6 HOURS
Refills: 0 | Status: DISCONTINUED | OUTPATIENT
Start: 2023-09-29 | End: 2023-10-04

## 2023-09-29 RX ORDER — INSULIN LISPRO 100/ML
VIAL (ML) SUBCUTANEOUS
Refills: 0 | Status: DISCONTINUED | OUTPATIENT
Start: 2023-09-29 | End: 2023-10-04

## 2023-09-29 RX ORDER — LANOLIN ALCOHOL/MO/W.PET/CERES
3 CREAM (GRAM) TOPICAL AT BEDTIME
Refills: 0 | Status: DISCONTINUED | OUTPATIENT
Start: 2023-09-29 | End: 2023-10-04

## 2023-09-29 RX ORDER — HEPARIN SODIUM 5000 [USP'U]/ML
5000 INJECTION INTRAVENOUS; SUBCUTANEOUS EVERY 8 HOURS
Refills: 0 | Status: DISCONTINUED | OUTPATIENT
Start: 2023-09-29 | End: 2023-10-04

## 2023-09-29 RX ORDER — GABAPENTIN 400 MG/1
300 CAPSULE ORAL EVERY 8 HOURS
Refills: 0 | Status: DISCONTINUED | OUTPATIENT
Start: 2023-09-29 | End: 2023-10-04

## 2023-09-29 RX ADMIN — LOSARTAN POTASSIUM 50 MILLIGRAM(S): 100 TABLET, FILM COATED ORAL at 06:31

## 2023-09-29 NOTE — CHART NOTE - NSCHARTNOTEFT_GEN_A_CORE
Patient off floor for amputation.     Continue Levofloxacin for now based on previous cx growing Pseudomonas.     Would wait over weekend to see if Cx grow additional bacteria.     Otherwise, could potentially complete prolonged course of Levofloxacin as has good bioavailability    Please call or message on [employer] Teams if with any questions.  Spectra [phone]

## 2023-09-29 NOTE — BRIEF OPERATIVE NOTE - NSICDXBRIEFPROCEDURE_GEN_ALL_CORE_FT
PROCEDURES:  Amputation, toe, at MTP joint 29-Sep-2023 15:55:50 Excisional Debridement of Soft Tissue and Bone with 2nd digit amputation, Left foot Paz Bui

## 2023-09-29 NOTE — PRE-OP CHECKLIST - DNR CLARIFICATION FORM COMPLETED
Spoke with Kecia And she states [facility] already called her and is working on getting her set up for a PICC line to be placed for antibiotic therapy. Historical info for medication placed for Ceftaz 1 g q24 hours for 14 days to be given after dialysis on M and Thursday. Placed PICC line order as well. Told patient to update us with any questions.    Per [facility]: patient to go to [facility] tomorrow 1/7/2021 at 4:15pm after dialysis for first dose and PICC placement.   
n/a

## 2023-09-29 NOTE — PROGRESS NOTE ADULT - ASSESSMENT
53 yr/o male with PMH of DM and HTN who was seen bedside in the ED today for complaint of worsening L 2nd toe infection. He was seen by Podiatry team in OP clinic and was sent to ED for debridement which is scheduled to be on Friday.      # Left 2nd toe wound   - XR Imaging  Foot consistent with OM of 2nd L toe   - Wound Culture now: Numerous Pseudomonas aeruginosa  - prior wound cx form sep 17 growing pseudomonas sensitive to cipro   - Local Wound Care done by podiatry    - Weight Bearing Status; WBAT w/ Heel Touch w/ Surgical Shoe;    - ESR: 65; CRP: 38.4    - surgery done on Friday 09.29 - Excisional Debridement of Soft Tissue and Bone with 2nd digit amputation, L foot.    - s/p one dose of levo & cefepime in ER.    - Weight Bearing Status; WBAT with Heel Touch w/ Surgical Shoe   - NPO MN thurs 9/28   - MRSA swab: -ve   - Continue Levofloxacin 750mg per ID.    -Would wait over weekend to see if Cx grow additional bacteria  - Lower Extremity Arterial Duplex B/l - Moderate right posterior tibial and peroneal artery stenosis. Normal arterial flow in the left lower extremity.  - Vascular consulted for the above finding:   - no need for any vascular interventions  - pt should follow up as outpt in 2-3 weeks  - Darco shoe ordered    # CKD stage 3a (1.4 baseline Cr)  - avoid nephrotoxic agent     # HTN   - on losartan      # DM   - Sliding scale   - Carb consistent diet      #DVT PPx- heparin sub q     #GI PPx-protonix     #Activity- WBAT     #Dispo- Anticipate DC in 48H

## 2023-09-29 NOTE — PROGRESS NOTE ADULT - ASSESSMENT
53 yr/o male with PMH of DM and HTN who was seen bedside in the ED today for complaint of worsening L 2nd toe infection. As per patient he noticed a small ulcer/wound on the left second toe 2 weeks ago, which was growing in size with foul smelling drainage. He said that he had amputation of the right second toe with Dr Fisher before. He was seen by Podiatry team in OP clinic and was sent to ED for debridement which is scheduled to be on Friday.   Patient presented to ED a week ago where he noticed sloughing of skin and drainage from the L 2nd toe. At that time, the wound did not probe to bone or show any other concern of infection and was prescribed PO bactrim and keflex. Patient followed up in Dr. Fisher outpatient clinic yesterday and was told to return to ED for surgical intervention.  Patient states he has been dressing the wound daily with betadine, gauze and Kerlix He denied any fevers, chills, N/V, Headaches, diarrhea constipation.       A/P  #  DFU/ PVD/ DM  - consulted by podiatry, debridement vs amputation planned for today   - ID recommended  Levofloxacin 750mg Q 24 hours   - Weight Bearing Status; WBAT with Heel Touch w/ Surgical Shoe  - pt was consulted by vascular surgery, no intervention recommended   - diabetes is well controlled   - carb consistent diet   - monitor finger stick   - start SS    # CKD stage 3a  - avoid nephrotoxic agent  - monitor BUN/cr and urine output     # HTN  - DASH/ carb consistent diet   - on losartan     # GI/DVT prophylaxis.    #Progress Note Handoff  Pending (specify): NPO for OR today, c/w Levofloxacin as per ID, podiatry f/u in 24 hours for wound care and weight bearing status   Family discussion: I spoke with pt, he agreed with a plan of care   Disposition: Home__x _/SNF___/Other________/Unknown at this time________

## 2023-09-29 NOTE — PROGRESS NOTE ADULT - SUBJECTIVE AND OBJECTIVE BOX
53 yr/o male with PMH of DM and HTN who was seen bedside in the ED today for complaint of worsening L 2nd toe infection. He was seen by Podiatry team in OP clinic and was sent to ED for debridement which is scheduled to be on Friday.    Today pt was comfortable, not in pain, NPO for OR.     PAST MEDICAL & SURGICAL HISTORY  Diabetes    Hypertension    History of penile disorder    Gallstones    History of eye surgery    History of penile implant    H/O foot surgery    SOCIAL HISTORY:  Social History:    ALLERGIES:  No Known Allergies    VITALS:   T(C): 36.7 (29 Sep 2023 11:22), Max: 36.9 (28 Sep 2023 22:06)  T(F): 98.1 (29 Sep 2023 11:07), Max: 98.4 (28 Sep 2023 22:06)  HR: 100 (29 Sep 2023 11:22) (82 - 100)  BP: 193/101 (29 Sep 2023 11:22) (132/90 - 193/101)  BP(mean): --  RR: 17 (29 Sep 2023 11:22) (17 - 18)  SpO2: 99% (29 Sep 2023 11:22) (99% - 99%)    Parameters below as of 29 Sep 2023 11:07  Patient On (Oxygen Delivery Method): room air        PHYSICAL EXAM:  Appearance: NAD	   Cardiovascular: Normal S1 S2  Respiratory: Lungs clear to auscultation	  Gastrointestinal:  Soft, Non-tender, positive BS	  Extremities: per Vascular exam  left foot:  Full thickness ulcer noted circumferentially around the L 2nd digit with drainage, malodor and bone exposure. Proximal phalanx exposed with dorsiflexion of the toe.   Partial thickness ulceration on the medial aspect of the L 3rd digit with 50:50 fibrogranular wound base. L 2nd and 3rd toes with darkened discoloration. Edema noted throughout the L foot.   Dressing noted on both feet   Neurologic: AAOx3, no focal deficit         LABS:                                   10.0   6.25  )-----------( 339      ( 29 Sep 2023 06:21 )             33.0   09-28    141  |  106  |  21<H>  ----------------------------<  96  4.3   |  26  |  1.5    Ca    8.7      28 Sep 2023 22:41  Mg     2.1     09-28    TPro  6.1  /  Alb  3.6  /  TBili  <0.2  /  DBili  x   /  AST  13  /  ALT  13  /  AlkPhos  109  09-28      Culture - Abscess with Gram Stain (collected 26 Sep 2023 14:01)  Source: .Abscess medial aspect L third digit  Gram Stain (27 Sep 2023 05:00):    No polymorphonuclear cells seen per low power field    Moderate Gram Negative Rods seen per oil power field  Preliminary Report (27 Sep 2023 21:04):    Numerous Pseudomonas aeruginosa    Culture - Abscess with Gram Stain (09.26.23 @ 14:01)   - Tobramycin: S <=2  - Gentamicin: S <=2  - Imipenem: S <=1  - Levofloxacin: R >4  - Meropenem: S <=1  - Piperacillin/Tazobactam: S 16  Gram Stain:   No polymorphonuclear cells seen per low power field   Moderate Gram Negative Rods seen per oil power field  - Amikacin: S <=16  - Aztreonam: I 16  - Cefepime: S 4  - Ceftazidime: S 4  - Ciprofloxacin: R >2  Specimen Source: .Abscess medial aspect L third digit  Culture Results:   Numerous Pseudomonas aeruginosa  Organism Identification: Pseudomonas aeruginosa  Organism: Pseudomonas aeruginosa  Method Type: KATHLEEN  RADIOLOGY:  < from: VA Duplex Lower Extrem Arterial, Bilat (09.26.23 @ 15:10) >  Impression:    Moderate right posterior tibial and peroneal artery stenosis.    Normal arterial flow in the left lower extremity.    < end of copied text >  < from: VA Duplex Lower Ext Vein Scan, Bilat (09.26.23 @ 15:08) >  IMPRESSION:  No evidence of deep venous thrombosis in either lower extremity.    < end of copied text >  < from: Xray Foot AP + Lateral + Oblique, Left (09.26.23 @ 11:32) >  Findings/  impression:    Limited study, particularly of the forefoot, cannot properly evaluate for   osteomyelitis. Apparent erosive change of the distal portion of the third   proximal phalanx, compatiblewith osteomyelitis in the appropriate   clinical setting. Correlate with exam and consider MR as warranted.   Hallux valgus deformity. Plantar calcaneal spurring. Vascular   calcifications.

## 2023-09-29 NOTE — CHART NOTE - NSCHARTNOTEFT_GEN_A_CORE
PACU ANESTHESIA ADMISSION NOTE      Procedure: Excisional debridement of soft tissue and bone with left 2nd toe amputation  Post op diagnosis:  Diabetic foot ulcer    ____  Intubated  TV:______       Rate: ______      FiO2: ______    _x___  Patent Airway    _x___  Full return of protective reflexes    _x___  Full recovery from anesthesia / back to baseline status    Vitals:  T(C): 98  HR: 99  BP: 129/76  RR: 19  SpO2: 97%    Mental Status:  _x___ Awake   _____ Alert   _____ Drowsy   _____ Sedated    Nausea/Vomiting:  _x___  NO       ______Yes,   See Post - Op Orders         Pain Scale (0-10):  __0___    Treatment: _x___ None    ____ See Post - Op/PCA Orders    Post - Operative Fluids:   __x__ Oral   ____ See Post - Op Orders    Plan: Discharge:   _x___Home       _____Floor     _____Critical Care    _____  Other:_________________    Comments:  No anesthesia issues or complications noted.  Discharge when criteria met.

## 2023-09-30 LAB
GLUCOSE BLDC GLUCOMTR-MCNC: 108 MG/DL — HIGH (ref 70–99)
GLUCOSE BLDC GLUCOMTR-MCNC: 143 MG/DL — HIGH (ref 70–99)
GLUCOSE BLDC GLUCOMTR-MCNC: 143 MG/DL — HIGH (ref 70–99)
GLUCOSE BLDC GLUCOMTR-MCNC: 91 MG/DL — SIGNIFICANT CHANGE UP (ref 70–99)
GRAM STN FLD: SIGNIFICANT CHANGE UP
SPECIMEN SOURCE: SIGNIFICANT CHANGE UP

## 2023-09-30 PROCEDURE — 99232 SBSQ HOSP IP/OBS MODERATE 35: CPT

## 2023-09-30 RX ORDER — CEFEPIME 1 G/1
2000 INJECTION, POWDER, FOR SOLUTION INTRAMUSCULAR; INTRAVENOUS EVERY 12 HOURS
Refills: 0 | Status: DISCONTINUED | OUTPATIENT
Start: 2023-09-30 | End: 2023-10-03

## 2023-09-30 RX ORDER — CHLORHEXIDINE GLUCONATE 213 G/1000ML
1 SOLUTION TOPICAL
Refills: 0 | Status: DISCONTINUED | OUTPATIENT
Start: 2023-09-30 | End: 2023-10-04

## 2023-09-30 RX ADMIN — HEPARIN SODIUM 5000 UNIT(S): 5000 INJECTION INTRAVENOUS; SUBCUTANEOUS at 22:54

## 2023-09-30 RX ADMIN — CEFEPIME 100 MILLIGRAM(S): 1 INJECTION, POWDER, FOR SOLUTION INTRAMUSCULAR; INTRAVENOUS at 22:53

## 2023-09-30 RX ADMIN — LOSARTAN POTASSIUM 50 MILLIGRAM(S): 100 TABLET, FILM COATED ORAL at 05:59

## 2023-09-30 RX ADMIN — GABAPENTIN 300 MILLIGRAM(S): 400 CAPSULE ORAL at 22:54

## 2023-09-30 RX ADMIN — CEFEPIME 100 MILLIGRAM(S): 1 INJECTION, POWDER, FOR SOLUTION INTRAMUSCULAR; INTRAVENOUS at 16:00

## 2023-09-30 NOTE — PROGRESS NOTE ADULT - SUBJECTIVE AND OBJECTIVE BOX
Podiatry Progress Note    Subjective:  MAUREEN CASTAÑEDA is a  53y Male.   Seen bedside.   Patient is a 53y old  Male who presents with a chief complaint of Foot (30 Sep 2023 11:26)      Past Medical History and Surgical History  PAST MEDICAL & SURGICAL HISTORY:  Diabetes      Hypertension      History of penile disorder      Gallstones      History of eye surgery      History of penile implant      H/O foot surgery           Objective:  Vital Signs Last 24 Hrs  T(C): 37 (30 Sep 2023 04:54), Max: 37 (30 Sep 2023 04:54)  T(F): 98.6 (30 Sep 2023 04:54), Max: 98.6 (30 Sep 2023 04:54)  HR: 99 (30 Sep 2023 04:54) (71 - 99)  BP: 141/66 (30 Sep 2023 04:54) (126/76 - 145/71)  BP(mean): 95 (29 Sep 2023 14:10) (93 - 104)  RR: 19 (30 Sep 2023 04:54) (15 - 19)  SpO2: 97% (30 Sep 2023 04:54) (97% - 99%)    Parameters below as of 30 Sep 2023 04:54  Patient On (Oxygen Delivery Method): room air                            10.0   6.25  )-----------( 339      ( 29 Sep 2023 06:21 )             33.0                 09-28    141  |  106  |  21<H>  ----------------------------<  96  4.3   |  26  |  1.5    Ca    8.7      28 Sep 2023 22:41  Mg     2.1     09-28    TPro  6.1  /  Alb  3.6  /  TBili  <0.2  /  DBili  x   /  AST  13  /  ALT  13  /  AlkPhos  109  09-28      Culture - Tissue with Gram Stain (collected 09-29-23 @ 14:00)  Source: .Tissue None  Gram Stain (09-30-23 @ 02:52):    Rare polymorphonuclear leukocytes seen per low power field    No organisms seen per oil power field        Physical Exam - Lower Extremity Focused:   Derm:   R 1st interspace surgical site well approximated with skin edges well coapted; sutures in place holding tension without signs of dehiscence or periwound necrosis.  No drainage. No malodors.    Partial thickness ulceration on the medial aspect of the L 3rd digit with 50:50 fibrogranular wound base.   Vascular: DP and PT Pulses Diminished; Foot is Warm to Warm to the touch; Capillary Refill Time < 3 Seconds;    Neuro: Protective Sensation Diminished / Moderately Neuropathic   MSK: No Pain On Palpation at Wound Site     Assessment:  s/p R 2nd digit amputation; DOS: 09/29/23; stable     Plan:  Chart reviewed and Patient evaluated. All Questions and Concerns Addressed and Answered  Local Wound Care; Wound Flushed w/ NS; Wound dressed w/ Betadine Soaked Gauze / DSD / Kerlix / ACE  Weight Bearing Status; WBAT w/ Heel Touch w/ Surgical Shoe;   Continue w/ Local Wound Care; Q24 Dressing Changes;  No Further Sx Debridement;   Patient Stable Per Podiatry Standpoint; Follow Up as an Outpatient   Discussed Plan w/ Attending; Dr. Fisher     Podiatry

## 2023-09-30 NOTE — PROGRESS NOTE ADULT - SUBJECTIVE AND OBJECTIVE BOX
53 yr/o male with PMH of DM and HTN who was seen bedside in the ED today for complaint of worsening L 2nd toe infection. He was seen by Podiatry team in OP clinic and was sent to ED for debridement which is scheduled to be on Friday.    Today pt was comfortable, denies pain, podiatry resident was at the bedside, pt was recommended to buy a knee scooter, he is weight bearing as tolerated, underwent surgical debridement,  2nd toe amputation with clear margins.     PAST MEDICAL & SURGICAL HISTORY  Diabetes    Hypertension    History of penile disorder    Gallstones    History of eye surgery    History of penile implant    H/O foot surgery    SOCIAL HISTORY:  Social History:    ALLERGIES:  No Known Allergies    VITALS:   T(C): 37 (30 Sep 2023 04:54), Max: 37 (30 Sep 2023 04:54)  T(F): 98.6 (30 Sep 2023 04:54), Max: 98.6 (30 Sep 2023 04:54)  HR: 99 (30 Sep 2023 04:54) (71 - 99)  BP: 141/66 (30 Sep 2023 04:54) (126/76 - 145/71)  BP(mean): 95 (29 Sep 2023 14:10) (93 - 104)  RR: 19 (30 Sep 2023 04:54) (15 - 19)  SpO2: 97% (30 Sep 2023 04:54) (97% - 99%)    Parameters below as of 30 Sep 2023 04:54  Patient On (Oxygen Delivery Method): room air          PHYSICAL EXAM:  Appearance: NAD	   Cardiovascular: Normal S1 S2  Respiratory: Lungs clear to auscultation	  Gastrointestinal:  Soft, Non-tender, positive BS  Extremities: dressing noted on left foot, chronic venostasis noted, left more than right   Neurologic: AAOx3, no focal deficit         LABS:                                   10.0   6.25  )-----------( 339      ( 29 Sep 2023 06:21 )             33.0   09-28    141  |  106  |  21<H>  ----------------------------<  96  4.3   |  26  |  1.5    Ca    8.7      28 Sep 2023 22:41  Mg     2.1     09-28    TPro  6.1  /  Alb  3.6  /  TBili  <0.2  /  DBili  x   /  AST  13  /  ALT  13  /  AlkPhos  109  09-28        Culture - Abscess with Gram Stain (collected 26 Sep 2023 14:01)  Source: .Abscess medial aspect L third digit  Gram Stain (27 Sep 2023 05:00):    No polymorphonuclear cells seen per low power field    Moderate Gram Negative Rods seen per oil power field  Preliminary Report (27 Sep 2023 21:04):    Numerous Pseudomonas aeruginosa    Culture - Abscess with Gram Stain (09.26.23 @ 14:01)   - Tobramycin: S <=2  - Gentamicin: S <=2  - Imipenem: S <=1  - Levofloxacin: R >4  - Meropenem: S <=1  - Piperacillin/Tazobactam: S 16  Gram Stain:   No polymorphonuclear cells seen per low power field   Moderate Gram Negative Rods seen per oil power field  - Amikacin: S <=16  - Aztreonam: I 16  - Cefepime: S 4  - Ceftazidime: S 4  - Ciprofloxacin: R >2  Specimen Source: .Abscess medial aspect L third digit  Culture Results:   Numerous Pseudomonas aeruginosa  Organism Identification: Pseudomonas aeruginosa  Organism: Pseudomonas aeruginosa  Method Type: KATHLEEN  RADIOLOGY:  < from: [facility] Duplex Lower Extrem Arterial, Bilat (09.26.23 @ 15:10) >  Impression:    Moderate right posterior tibial and peroneal artery stenosis.    Normal arterial flow in the left lower extremity.    < end of copied text >  < from: VA Duplex Lower Ext Vein Scan, Bilat (09.26.23 @ 15:08) >  IMPRESSION:  No evidence of deep venous thrombosis in either lower extremity.    < end of copied text >  < from: Xray Foot AP + Lateral + Oblique, Left (09.26.23 @ 11:32) >  Findings/  impression:    Limited study, particularly of the forefoot, cannot properly evaluate for   osteomyelitis. Apparent erosive change of the distal portion of the third   proximal phalanx, compatiblewith osteomyelitis in the appropriate   clinical setting. Correlate with exam and consider MR as warranted.   Hallux valgus deformity. Plantar calcaneal spurring. Vascular   calcifications.      MEDICATIONS  (STANDING):  dextrose 5%. 1000 milliLiter(s) (50 mL/Hr) IV Continuous <Continuous>  dextrose 50% Injectable 25 Gram(s) IV Push once  gabapentin 300 milliGRAM(s) Oral every 8 hours  glucagon  Injectable 1 milliGRAM(s) IntraMuscular once  heparin   Injectable 5000 Unit(s) SubCutaneous every 8 hours  insulin lispro (ADMELOG) corrective regimen sliding scale   SubCutaneous three times a day before meals  levoFLOXacin IVPB      levoFLOXacin IVPB 750 milliGRAM(s) IV Intermittent every 24 hours  losartan 50 milliGRAM(s) Oral daily  pantoprazole    Tablet 40 milliGRAM(s) Oral before breakfast    MEDICATIONS  (PRN):  acetaminophen     Tablet .. 650 milliGRAM(s) Oral every 6 hours PRN Temp greater or equal to 38C (100.4F), Mild Pain (1 - 3), Moderate Pain (4 - 6)  aluminum hydroxide/magnesium hydroxide/simethicone Suspension 30 milliLiter(s) Oral every 4 hours PRN Dyspepsia  dextrose Oral Gel 15 Gram(s) Oral once PRN Blood Glucose LESS THAN 70 milliGRAM(s)/deciliter  guaifenesin/dextromethorphan Oral Liquid 20 milliLiter(s) Oral every 8 hours PRN Cough  melatonin 3 milliGRAM(s) Oral at bedtime PRN Insomnia  oxyCODONE    IR 5 milliGRAM(s) Oral every 6 hours PRN Severe Pain (7 - 10)

## 2023-09-30 NOTE — PROGRESS NOTE ADULT - ASSESSMENT
53 yr/o male with PMH of DM and HTN who was seen bedside in the ED today for complaint of worsening L 2nd toe infection. As per patient he noticed a small ulcer/wound on the left second toe 2 weeks ago, which was growing in size with foul smelling drainage. He said that he had amputation of the right second toe with Dr Fisher before. He was seen by Podiatry team in OP clinic and was sent to ED for debridement which is scheduled to be on Friday.   Patient presented to ED a week ago where he noticed sloughing of skin and drainage from the L 2nd toe. At that time, the wound did not probe to bone or show any other concern of infection and was prescribed PO bactrim and keflex. Patient followed up in Dr. Fisher outpatient clinic yesterday and was told to return to ED for surgical intervention.  Patient states he has been dressing the wound daily with betadine, gauze and Kerlix He denied any fevers, chills, N/V, Headaches, diarrhea constipation.       A/P  #  DFU/ PVD/ DM  - consulted by podiatry, underwent surgical  debridement with 2nd toe  amputation on 9/29 with clear surgical margins   - ID recommended  Levofloxacin 750mg Q 24 hours originally, pt needs a follow up for recommendations on discharge   - Weight Bearing Status; as tolerated ( discussed with podiatry at the bedside), knee  scooter recommended   - pt was consulted by vascular surgery, needs a follow up before discharge   - diabetes is well controlled   - carb consistent diet   - monitor finger stick   - start SS    # CKD stage 3a  - stable now   - avoid nephrotoxic agent  - monitor BUN/cr and urine output     # HTN  - DASH/ carb consistent diet   - on losartan     # GI/DVT prophylaxis.    #Progress Note Handoff  Pending (specify): ID and vascular surgery follow up prior discharge, anticipate discharge in 24 hours.   Family discussion: I spoke with pt, he agreed with a plan of care   Disposition: Home__x _/SNF___/Other________/Unknown at this time________  53 yr/o male with PMH of DM and HTN who was seen bedside in the ED today for complaint of worsening L 2nd toe infection. As per patient he noticed a small ulcer/wound on the left second toe 2 weeks ago, which was growing in size with foul smelling drainage. He said that he had amputation of the right second toe with Dr Fisher before. He was seen by Podiatry team in OP clinic and was sent to ED for debridement which is scheduled to be on Friday.   Patient presented to ED a week ago where he noticed sloughing of skin and drainage from the L 2nd toe. At that time, the wound did not probe to bone or show any other concern of infection and was prescribed PO bactrim and keflex. Patient followed up in Dr. Fisher outpatient clinic yesterday and was told to return to ED for surgical intervention.  Patient states he has been dressing the wound daily with betadine, gauze and Kerlix He denied any fevers, chills, N/V, Headaches, diarrhea constipation.       A/P  #  DFU/ PVD/ DM  - consulted by podiatry, underwent surgical  debridement with 2nd toe  amputation on 9/29 with clear surgical margins   - sensitivity report noted, case d/w ID attending, will change Abx to Cefepime 2 gm Q 12 hours for now, reevaluate on Monday   - Weight Bearing Status; as tolerated ( discussed with podiatry at the bedside), knee  scooter recommended   - pt was consulted by vascular surgery, needs a follow up before discharge   - diabetes is well controlled   - carb consistent diet   - monitor finger stick   - start SS    # CKD stage 3a  - stable now   - avoid nephrotoxic agent  - monitor BUN/cr and urine output     # HTN  - DASH/ carb consistent diet   - on losartan     # GI/DVT prophylaxis.    #Progress Note Handoff  Pending (specify): start Cefepime 2 mg Q 12 hours, d/c Levofloxacin,  vascular surgery follow up prior discharge, anticipate discharge on Monday   Family discussion: I spoke with pt, he agreed with a plan of care   Disposition: Home__x _/SNF___/Other________/Unknown at this time________

## 2023-10-01 LAB
CULTURE RESULTS: SIGNIFICANT CHANGE UP
GLUCOSE BLDC GLUCOMTR-MCNC: 111 MG/DL — HIGH (ref 70–99)
GLUCOSE BLDC GLUCOMTR-MCNC: 121 MG/DL — HIGH (ref 70–99)
GLUCOSE BLDC GLUCOMTR-MCNC: 126 MG/DL — HIGH (ref 70–99)
GLUCOSE BLDC GLUCOMTR-MCNC: 99 MG/DL — SIGNIFICANT CHANGE UP (ref 70–99)
ORGANISM # SPEC MICROSCOPIC CNT: SIGNIFICANT CHANGE UP
ORGANISM # SPEC MICROSCOPIC CNT: SIGNIFICANT CHANGE UP
SPECIMEN SOURCE: SIGNIFICANT CHANGE UP

## 2023-10-01 PROCEDURE — 99232 SBSQ HOSP IP/OBS MODERATE 35: CPT

## 2023-10-01 PROCEDURE — 73718 MRI LOWER EXTREMITY W/O DYE: CPT | Mod: 26,LT

## 2023-10-01 RX ADMIN — CEFEPIME 100 MILLIGRAM(S): 1 INJECTION, POWDER, FOR SOLUTION INTRAMUSCULAR; INTRAVENOUS at 05:56

## 2023-10-01 RX ADMIN — GABAPENTIN 300 MILLIGRAM(S): 400 CAPSULE ORAL at 05:57

## 2023-10-01 RX ADMIN — LOSARTAN POTASSIUM 50 MILLIGRAM(S): 100 TABLET, FILM COATED ORAL at 05:58

## 2023-10-01 RX ADMIN — CHLORHEXIDINE GLUCONATE 1 APPLICATION(S): 213 SOLUTION TOPICAL at 05:56

## 2023-10-01 RX ADMIN — CEFEPIME 100 MILLIGRAM(S): 1 INJECTION, POWDER, FOR SOLUTION INTRAMUSCULAR; INTRAVENOUS at 17:58

## 2023-10-01 NOTE — PROGRESS NOTE ADULT - ASSESSMENT
53 yr/o male with PMH of DM and HTN who was seen bedside in the ED today for complaint of worsening L 2nd toe infection. As per patient he noticed a small ulcer/wound on the left second toe 2 weeks ago, which was growing in size with foul smelling drainage. He said that he had amputation of the right second toe with Dr Fisher before. He was seen by Podiatry team in OP clinic and was sent to ED for debridement which is scheduled to be on Friday.   Patient presented to ED a week ago where he noticed sloughing of skin and drainage from the L 2nd toe. At that time, the wound did not probe to bone or show any other concern of infection and was prescribed PO bactrim and keflex. Patient followed up in Dr. Fisher outpatient clinic yesterday and was told to return to ED for surgical intervention.  Patient states he has been dressing the wound daily with betadine, gauze and Kerlix He denied any fevers, chills, N/V, Headaches, diarrhea constipation.       A/P  #  DFU/ PVD/ DM  - consulted by podiatry, underwent surgical  debridement with 2nd toe  amputation on 9/29 with clear surgical margins   - sensitivity report noted, case d/w ID attending,  Abx changed to  to Cefepime 2 gm Q 12 hours on 9/30  - MRI of the left foot recommended by ID today, ordered, please, f/u   - Weight Bearing Status; as tolerated ( discussed with podiatry at the bedside), knee  scooter recommended   - pt was consulted by vascular surgery, needs a follow up before discharge   - diabetes is well controlled   - carb consistent diet   - monitor finger stick   - start SS    # CKD stage 3a  - stable now   - avoid nephrotoxic agent  - monitor BUN/cr and urine output     # HTN  - DASH/ carb consistent diet   - on losartan     # GI/DVT prophylaxis.    #Progress Note Handoff  Pending (specify): c/w  Cefepime 2 mg Q 12 hours, get an MRI of left foot to r/o OM of the 3rd toe, vascular surgery follow up prior discharge, anticipate discharge in 24-48 hours   Family discussion: I spoke with pt, he agreed with a plan of care   Disposition: Home__x _/SNF___/Other________/Unknown at this time________

## 2023-10-01 NOTE — PROGRESS NOTE ADULT - SUBJECTIVE AND OBJECTIVE BOX
53 yr/o male with PMH of DM and HTN who was seen bedside in the ED today for complaint of worsening L 2nd toe infection. He was seen by Podiatry team in OP clinic and was sent to ED for debridement which is scheduled to be on Friday.    Today pt was comfortable, denies any complaints, ID changed Abx to Cefepime yesterday, case d/w ID attending today, an MRI of the left foot recommended to r/o OM.     PAST MEDICAL & SURGICAL HISTORY  Diabetes    Hypertension    History of penile disorder    Gallstones    History of eye surgery    History of penile implant    H/O foot surgery    SOCIAL HISTORY:  Social History:    ALLERGIES:  No Known Allergies    VITALS:   T(C): 36.7 (01 Oct 2023 05:12), Max: 37.3 (30 Sep 2023 21:00)  T(F): 98 (01 Oct 2023 05:12), Max: 99.2 (30 Sep 2023 21:00)  HR: 87 (01 Oct 2023 05:12) (85 - 93)  BP: 124/76 (01 Oct 2023 05:12) (107/77 - 124/76)  BP(mean): --  RR: 18 (01 Oct 2023 05:12) (18 - 18)  SpO2: 99% (30 Sep 2023 21:00) (99% - 99%)    Parameters below as of 30 Sep 2023 21:00  Patient On (Oxygen Delivery Method): room air      PHYSICAL EXAM:  Appearance: NAD	   Cardiovascular: Normal S1 S2  Respiratory: Lungs clear to auscultation	  Gastrointestinal:  Soft, Non-tender, positive BS  Extremities: dressing noted on left foot, chronic venostasis noted, left more than right   Neurologic: AAOx3, no focal deficit       LABS:             no new labs today     Culture - Abscess with Gram Stain (collected 26 Sep 2023 14:01)  Source: .Abscess medial aspect L third digit  Gram Stain (27 Sep 2023 05:00):    No polymorphonuclear cells seen per low power field    Moderate Gram Negative Rods seen per oil power field  Preliminary Report (27 Sep 2023 21:04):    Numerous Pseudomonas aeruginosa      Culture - Abscess with Gram Stain (09.26.23 @ 14:01)   - Tobramycin: S <=2  - Gentamicin: S <=2  - Imipenem: S <=1  - Levofloxacin: R >4  - Meropenem: S <=1  - Piperacillin/Tazobactam: S 16  Gram Stain:   No polymorphonuclear cells seen per low power field   Moderate Gram Negative Rods seen per oil power field  - Amikacin: S <=16  - Aztreonam: I 16  - Cefepime: S 4  - Ceftazidime: S 4  - Ciprofloxacin: R >2  Specimen Source: .Abscess medial aspect L third digit  Culture Results:   Numerous Pseudomonas aeruginosa  Organism Identification: Pseudomonas aeruginosa  Organism: Pseudomonas aeruginosa  Method Type: KATHLEEN  RADIOLOGY:  < from: VA Duplex Lower Extrem Arterial, Bilat (09.26.23 @ 15:10) >  Impression:    Moderate right posterior tibial and peroneal artery stenosis.    Normal arterial flow in the left lower extremity.    < end of copied text >  < from: VA Duplex Lower Ext Vein Scan, Bilat (09.26.23 @ 15:08) >  IMPRESSION:  No evidence of deep venous thrombosis in either lower extremity.    < end of copied text >  < from: Xray Foot AP + Lateral + Oblique, Left (09.26.23 @ 11:32) >  Findings/  impression:    Limited study, particularly of the forefoot, cannot properly evaluate for   osteomyelitis. Apparent erosive change of the distal portion of the third   proximal phalanx, compatiblewith osteomyelitis in the appropriate   clinical setting. Correlate with exam and consider MR as warranted.   Hallux valgus deformity. Plantar calcaneal spurring. Vascular   calcifications.    MEDICATIONS  (STANDING):  cefepime   IVPB 2000 milliGRAM(s) IV Intermittent every 12 hours  chlorhexidine 2% Cloths 1 Application(s) Topical <User Schedule>  dextrose 5%. 1000 milliLiter(s) (50 mL/Hr) IV Continuous <Continuous>  dextrose 50% Injectable 25 Gram(s) IV Push once  gabapentin 300 milliGRAM(s) Oral every 8 hours  glucagon  Injectable 1 milliGRAM(s) IntraMuscular once  heparin   Injectable 5000 Unit(s) SubCutaneous every 8 hours  insulin lispro (ADMELOG) corrective regimen sliding scale   SubCutaneous three times a day before meals  losartan 50 milliGRAM(s) Oral daily  pantoprazole    Tablet 40 milliGRAM(s) Oral before breakfast    MEDICATIONS  (PRN):  acetaminophen     Tablet .. 650 milliGRAM(s) Oral every 6 hours PRN Temp greater or equal to 38C (100.4F), Mild Pain (1 - 3), Moderate Pain (4 - 6)  aluminum hydroxide/magnesium hydroxide/simethicone Suspension 30 milliLiter(s) Oral every 4 hours PRN Dyspepsia  dextrose Oral Gel 15 Gram(s) Oral once PRN Blood Glucose LESS THAN 70 milliGRAM(s)/deciliter  guaifenesin/dextromethorphan Oral Liquid 20 milliLiter(s) Oral every 8 hours PRN Cough  melatonin 3 milliGRAM(s) Oral at bedtime PRN Insomnia  oxyCODONE    IR 5 milliGRAM(s) Oral every 6 hours PRN Severe Pain (7 - 10)

## 2023-10-02 LAB
-  AMIKACIN: SIGNIFICANT CHANGE UP
-  AZTREONAM: SIGNIFICANT CHANGE UP
-  CEFEPIME: SIGNIFICANT CHANGE UP
-  CEFTAZIDIME: SIGNIFICANT CHANGE UP
-  CIPROFLOXACIN: SIGNIFICANT CHANGE UP
-  GENTAMICIN: SIGNIFICANT CHANGE UP
-  IMIPENEM: SIGNIFICANT CHANGE UP
-  LEVOFLOXACIN: SIGNIFICANT CHANGE UP
-  MEROPENEM: SIGNIFICANT CHANGE UP
-  PIPERACILLIN/TAZOBACTAM: SIGNIFICANT CHANGE UP
-  TOBRAMYCIN: SIGNIFICANT CHANGE UP
GLUCOSE BLDC GLUCOMTR-MCNC: 101 MG/DL — HIGH (ref 70–99)
GLUCOSE BLDC GLUCOMTR-MCNC: 108 MG/DL — HIGH (ref 70–99)
GLUCOSE BLDC GLUCOMTR-MCNC: 120 MG/DL — HIGH (ref 70–99)
GLUCOSE BLDC GLUCOMTR-MCNC: 95 MG/DL — SIGNIFICANT CHANGE UP (ref 70–99)
METHOD TYPE: SIGNIFICANT CHANGE UP

## 2023-10-02 PROCEDURE — 99232 SBSQ HOSP IP/OBS MODERATE 35: CPT

## 2023-10-02 RX ADMIN — LOSARTAN POTASSIUM 50 MILLIGRAM(S): 100 TABLET, FILM COATED ORAL at 06:22

## 2023-10-02 RX ADMIN — Medication 20 MILLILITER(S): at 22:07

## 2023-10-02 RX ADMIN — CEFEPIME 100 MILLIGRAM(S): 1 INJECTION, POWDER, FOR SOLUTION INTRAMUSCULAR; INTRAVENOUS at 19:30

## 2023-10-02 NOTE — PROGRESS NOTE ADULT - SUBJECTIVE AND OBJECTIVE BOX
MAUREEN CASTAÑEDA  53y, Male  Allergy: No Known Allergies      LOS  6d    CHIEF COMPLAINT: Foot (02 Oct 2023 13:40)      INTERVAL EVENTS/HPI  - No acute events overnight  - T(F): , Max: 98.6 (10-01-23 @ 21:48)  - Denies any worsening symptoms  - Tolerating medication  -    -      ROS  General: Denies rigors, nightsweats  HEENT: Denies headache, rhinorrhea, sore throat, eye pain  CV: Denies CP, palpitations  PULM: Denies wheezing, hemoptysis  GI: Denies hematemesis, hematochezia, melena  : Denies discharge, hematuria  MSK: Denies arthralgias, myalgias  SKIN: Denies rash, lesions  NEURO: Denies paresthesias, weakness  PSYCH: Denies depression, anxiety    VITALS:  T(F): 97.6, Max: 98.6 (10-01-23 @ 21:48)  HR: 80  BP: 140/85  RR: 18Vital Signs Last 24 Hrs  T(C): 36.4 (02 Oct 2023 13:12), Max: 37 (01 Oct 2023 21:48)  T(F): 97.6 (02 Oct 2023 13:12), Max: 98.6 (01 Oct 2023 21:48)  HR: 80 (02 Oct 2023 13:12) (80 - 82)  BP: 140/85 (02 Oct 2023 13:12) (140/85 - 150/70)  BP(mean): --  RR: 18 (02 Oct 2023 13:12) (18 - 19)  SpO2: --        PHYSICAL EXAM:  Gen: NAD, resting in bed  HEENT: Normocephalic, atraumatic  Neck: supple, no lymphadenopathy  CV: Regular rate & regular rhythm  Lungs: decreased BS at bases, no fremitus  Abdomen: Soft, BS present  Ext: Warm, well perfused  Neuro: non focal, awake  Skin: no rash, no erythema  Lines: no phlebitis    FH: Non-contributory  Social Hx: Non-contributory    TESTS & MEASUREMENTS:                  Culture - Tissue with Gram Stain (collected 09-29-23 @ 14:00)  Source: .Tissue None  Gram Stain (09-30-23 @ 02:52):    Rare polymorphonuclear leukocytes seen per low power field    No organisms seen per oil power field  Preliminary Report (10-01-23 @ 17:48):    Rare Pseudomonas aeruginosa    Culture - Abscess with Gram Stain (collected 09-26-23 @ 14:01)  Source: .Abscess medial aspect L third digit  Gram Stain (09-27-23 @ 05:00):    No polymorphonuclear cells seen per low power field    Moderate Gram Negative Rods seen per oil power field  Final Report (10-01-23 @ 12:23):    Numerous Pseudomonas aeruginosa  Organism: Pseudomonas aeruginosa (10-01-23 @ 12:23)  Organism: Pseudomonas aeruginosa (10-01-23 @ 12:23)      Method Type: KATHLEEN      -  Amikacin: S <=16      -  Aztreonam: I 16      -  Cefepime: S 4      -  Ceftazidime: S 4      -  Ciprofloxacin: R >2      -  Gentamicin: S <=2      -  Imipenem: S <=1      -  Levofloxacin: R >4      -  Meropenem: S <=1      -  Piperacillin/Tazobactam: S 16      -  Tobramycin: S <=2    Culture - Abscess with Gram Stain (collected 09-17-23 @ 15:43)  Source: .Abscess L 2nd digit ulcer  Gram Stain (09-18-23 @ 04:20):    Few polymorphonuclear leukocytes seen per low power field    Moderate Gram Negative Rods seen per oil power field  Final Report (09-22-23 @ 16:03):    Numerous Pseudomonas aeruginosa  Organism: Pseudomonas aeruginosa (09-22-23 @ 16:03)  Organism: Pseudomonas aeruginosa (09-22-23 @ 16:03)      Method Type: KATHLEEN      -  Amikacin: S <=16      -  Aztreonam: S <=4      -  Cefepime: S <=2      -  Ceftazidime: I 16      -  Ciprofloxacin: S <=0.25      -  Gentamicin: S 4      -  Imipenem: S <=1      -  Levofloxacin: S <=0.5      -  Meropenem: S <=1      -  Piperacillin/Tazobactam: S <=8      -  Tobramycin: S <=2            INFECTIOUS DISEASES TESTING  MRSA PCR Result.: Negative (09-28-23 @ 09:25)  Rapid RVP Result: NotDetec (09-27-23 @ 21:31)  COVID-19 PCR: NotDetec (10-27-22 @ 04:30)  MRSA PCR Result.: Negative (10-26-22 @ 09:05)      INFLAMMATORY MARKERS  Sedimentation Rate, Erythrocyte: 47 mm/Hr (09-27-23 @ 07:41)  C-Reactive Protein, Serum: 26.3 mg/L (09-27-23 @ 07:41)  Sedimentation Rate, Erythrocyte: 65 mm/Hr (09-26-23 @ 16:46)  C-Reactive Protein, Serum: 38.4 mg/L (09-26-23 @ 16:46)      RADIOLOGY & ADDITIONAL TESTS:  I have personally reviewed the last available Chest xray  CXR      CT      CARDIOLOGY TESTING  12 Lead ECG:   Ventricular Rate 81 BPM    Atrial Rate 81 BPM    P-R Interval 148 ms    QRS Duration 84 ms    Q-T Interval 370 ms    QTC Calculation(Bazett) 429 ms    P Axis 75 degrees    R Axis 45 degrees    T Axis 38 degrees    Diagnosis Line Normal sinus rhythm  Septal infarct , age undetermined  Abnormal ECG    Confirmed by MEGAN POSADA MD ([id number]) on 9/28/2023 6:49:39 AM (09-27-23 @ 10:41)      MEDICATIONS  cefepime   IVPB 2000 IV Intermittent every 12 hours  chlorhexidine 2% Cloths 1 Topical <User Schedule>  dextrose 5%. 1000 IV Continuous <Continuous>  dextrose 50% Injectable 25 IV Push once  gabapentin 300 Oral every 8 hours  glucagon  Injectable 1 IntraMuscular once  heparin   Injectable 5000 SubCutaneous every 8 hours  insulin lispro (ADMELOG) corrective regimen sliding scale  SubCutaneous three times a day before meals  losartan 50 Oral daily  pantoprazole    Tablet 40 Oral before breakfast      WEIGHT  Weight (kg): 97.5 (09-29-23 @ 11:22)      ANTIBIOTICS:  cefepime   IVPB 2000 milliGRAM(s) IV Intermittent every 12 hours      All available historical records have been reviewed

## 2023-10-02 NOTE — PROGRESS NOTE ADULT - ASSESSMENT
ASSESSMENT   53 yr/o male with PMH of DM and HTN who was seen bedside in the ED today for complaint of worsening L 2nd toe infection.     IMPRESSION  #2nd Left Toe Ulcer with exposed bone - OM   - s/p second toe amputatuion 9/29 - Wound Cx Pseudomonas - FQ-Resistant     #3rd Left Toe ulcer with septic arthritis/OM  - Xray Foot AP + Lateral + Oblique, Left (09.26.23 @ 11:32): Limited study, particularly of the forefoot, cannot properly evaluate for  osteomyelitis. Apparent erosive change of the distal portion of the third  proximal phalanx, compatiblewith osteomyelitis in the appropriate  clinical setting. Correlate with exam and consider MR as warranted.   Hallux valgus deformity. Plantar calcaneal spurring. Vascular  calcifications.  - MR Foot No Cont, Left (10.01.23 @ 18:04) >there is septic arthritis of the third toe PIP joint with osteomyelitis in the proximal and middle phalanges. There is no definite  third MTP or DIP septic arthritis.    - Sedimentation Rate, Erythrocyte: 65 mm/Hr (09.26.23 @ 16:46)  - C-Reactive Protein, Serum: 26.3 mg/L (09.27.23 @ 07:41)    #DM II  #Abx allergy: No Known Allergies    RECOMMENDATIONS  - continue cefepime 2g q 8 hours   - given septic arthritis/OM of third toe PIP, will plan 6 week course (9/30-10/10)  - podiatry follow-up     - Weekly CBC, CMP, ESR/CRP  - ID follow-up with Dr. Martell Benoit for Telehealth. We will call the patient between 10:30-1:30      [address]       [phone]       Fax [phone]        Please call or message on [employer] Teams if with any questions.  Spectra [phone]       ASSESSMENT   53 yr/o male with PMH of DM and HTN who was seen bedside in the ED today for complaint of worsening L 2nd toe infection.     IMPRESSION  #2nd Left Toe Ulcer with exposed bone - OM   - s/p second toe amputatuion 9/29 - Wound Cx Pseudomonas - FQ-Resistant     #3rd Left Toe ulcer with septic arthritis/OM  - Xray Foot AP + Lateral + Oblique, Left (09.26.23 @ 11:32): Limited study, particularly of the forefoot, cannot properly evaluate for  osteomyelitis. Apparent erosive change of the distal portion of the third  proximal phalanx, compatiblewith osteomyelitis in the appropriate  clinical setting. Correlate with exam and consider MR as warranted.   Hallux valgus deformity. Plantar calcaneal spurring. Vascular  calcifications.  - MR Foot No Cont, Left (10.01.23 @ 18:04) >there is septic arthritis of the third toe PIP joint with osteomyelitis in the proximal and middle phalanges. There is no definite  third MTP or DIP septic arthritis.    - Sedimentation Rate, Erythrocyte: 65 mm/Hr (09.26.23 @ 16:46)  - C-Reactive Protein, Serum: 26.3 mg/L (09.27.23 @ 07:41)    #DM II  #Abx allergy: No Known Allergies    RECOMMENDATIONS  - continue cefepime 2g q 8 hours   - given septic arthritis/OM of third toe PIP, will plan 6 week course (9/30-11/10)    - Weekly CBC, CMP, ESR/CRP  - ID follow-up with Dr. Martell Benoit for Telehealth. We will call the patient between 10:30-1:30      [address]       [phone]       Fax [phone]        Please call or message on [employer] Teams if with any questions.  Spectra [phone]

## 2023-10-02 NOTE — PROGRESS NOTE ADULT - ASSESSMENT
53 yr/o male with PMH of DM and HTN who was seen bedside in the ED today for complaint of worsening L 2nd toe infection. As per patient he noticed a small ulcer/wound on the left second toe 2 weeks ago, which was growing in size with foul smelling drainage. He said that he had amputation of the right second toe with Dr Fisher before. He was seen by Podiatry team in OP clinic and was sent to ED for debridement which is scheduled to be on Friday.   Patient presented to ED a week ago where he noticed sloughing of skin and drainage from the L 2nd toe. At that time, the wound did not probe to bone or show any other concern of infection and was prescribed PO bactrim and keflex. Patient followed up in Dr. Fisher outpatient clinic yesterday and was told to return to ED for surgical intervention.  Patient states he has been dressing the wound daily with betadine, gauze and Kerlix He denied any fevers, chills, N/V, Headaches, diarrhea constipation.       A/P  #  DFU/ PVD/ DM  - consulted by podiatry, underwent surgical  debridement with 2nd toe  amputation on 9/29 with clear surgical margins   - an MRI showed septic arthritis and OM of the left 3rd toe, pt needs podiatry and ID follow up, pt might need surgical resection or long term Abx and PICC line  - sensitivity report noted, case d/w ID attending,  Abx changed to Cefepime 2 gm Q 12 hours on 9/30  - MRI of the left foot noted   - Weight Bearing Status; as tolerated ( discussed with podiatry at the bedside), knee  scooter recommended   - pt was consulted by vascular surgery, needs a follow up before discharge   - diabetes is well controlled   - carb consistent diet   - monitor finger stick   -  SS    # CKD stage 3a  - stable now   - avoid nephrotoxic agent  - monitor BUN/cr and urine output     # HTN  - DASH/ carb consistent diet   - on losartan     # GI/DVT prophylaxis.    #Progress Note Handoff  Pending (specify): c/w  Cefepime 2 mg Q 12 hours,  vascular surgery, podiatry and ID follow up to finalize management ( surgical debridement/amputation vs long term Abx via PICC line)   Family discussion: I spoke with pt, he agreed with a plan of care   Disposition: Home__x _/SNF___/Other________/Unknown at this time________

## 2023-10-02 NOTE — PROGRESS NOTE ADULT - SUBJECTIVE AND OBJECTIVE BOX
53 yr/o male with PMH of DM and HTN who was seen bedside in the ED today for complaint of worsening L 2nd toe infection. He was seen by Podiatry team in OP clinic and was sent to ED for debridement which is scheduled to be on Friday.    Today pt was comfortable, not in pain, ambulating.     PAST MEDICAL & SURGICAL HISTORY  Diabetes    Hypertension    History of penile disorder    Gallstones    History of eye surgery    History of penile implant    H/O foot surgery    SOCIAL HISTORY:  Social History:    ALLERGIES:  No Known Allergies    VITALS:   T(C): 36.4 (02 Oct 2023 13:12), Max: 37 (01 Oct 2023 21:48)  T(F): 97.6 (02 Oct 2023 13:12), Max: 98.6 (01 Oct 2023 21:48)  HR: 80 (02 Oct 2023 13:12) (80 - 82)  BP: 140/85 (02 Oct 2023 13:12) (140/85 - 150/70)  BP(mean): --  RR: 18 (02 Oct 2023 13:12) (18 - 19)      PHYSICAL EXAM:  Appearance: NAD	   Cardiovascular: Normal S1 S2  Respiratory: Lungs clear to auscultation	  Gastrointestinal:  Soft, Non-tender, positive BS  Extremities: dressing noted on left foot, chronic venostasis noted, left more than right   Neurologic: AAOx3, no focal deficit       LABS:             no new labs today     Culture - Abscess with Gram Stain (collected 26 Sep 2023 14:01)  Source: .Abscess medial aspect L third digit  Gram Stain (27 Sep 2023 05:00):    No polymorphonuclear cells seen per low power field    Moderate Gram Negative Rods seen per oil power field  Preliminary Report (27 Sep 2023 21:04):    Numerous Pseudomonas aeruginosa      Culture - Abscess with Gram Stain (09.26.23 @ 14:01)   - Tobramycin: S <=2  - Gentamicin: S <=2  - Imipenem: S <=1  - Levofloxacin: R >4  - Meropenem: S <=1  - Piperacillin/Tazobactam: S 16  Gram Stain:   No polymorphonuclear cells seen per low power field   Moderate Gram Negative Rods seen per oil power field  - Amikacin: S <=16  - Aztreonam: I 16  - Cefepime: S 4  - Ceftazidime: S 4  - Ciprofloxacin: R >2  Specimen Source: .Abscess medial aspect L third digit  Culture Results:   Numerous Pseudomonas aeruginosa  Organism Identification: Pseudomonas aeruginosa  Organism: Pseudomonas aeruginosa  Method Type: KATHLEEN  RADIOLOGY:  < from: VA Duplex Lower Extrem Arterial, Bilat (09.26.23 @ 15:10) >  Impression:    Moderate right posterior tibial and peroneal artery stenosis.    Normal arterial flow in the left lower extremity.    < end of copied text >  < from: VA Duplex Lower Ext Vein Scan, Bilat (09.26.23 @ 15:08) >  IMPRESSION:  No evidence of deep venous thrombosis in either lower extremity.    < end of copied text >  < from: Xray Foot AP + Lateral + Oblique, Left (09.26.23 @ 11:32) >  Findings/  impression:    Limited study, particularly of the forefoot, cannot properly evaluate for   osteomyelitis. Apparent erosive change of the distal portion of the third   proximal phalanx, compatiblewith osteomyelitis in the appropriate   clinical setting. Correlate with exam and consider MR as warranted.   Hallux valgus deformity. Plantar calcaneal spurring. Vascular   calcifications.    < from: MR Foot No Cont, Left (10.01.23 @ 18:04) >    IMPRESSION:  1.  Third toe PIP septic arthritis and surrounding osteomyelitis.  2.  Status post amputation of the second toe.  3.  Stress reaction noted within the cuneiforms.    < end of copied text >      MEDICATIONS  (STANDING):  cefepime   IVPB 2000 milliGRAM(s) IV Intermittent every 12 hours  chlorhexidine 2% Cloths 1 Application(s) Topical <User Schedule>  dextrose 5%. 1000 milliLiter(s) (50 mL/Hr) IV Continuous <Continuous>  dextrose 50% Injectable 25 Gram(s) IV Push once  gabapentin 300 milliGRAM(s) Oral every 8 hours  glucagon  Injectable 1 milliGRAM(s) IntraMuscular once  heparin   Injectable 5000 Unit(s) SubCutaneous every 8 hours  insulin lispro (ADMELOG) corrective regimen sliding scale   SubCutaneous three times a day before meals  losartan 50 milliGRAM(s) Oral daily  pantoprazole    Tablet 40 milliGRAM(s) Oral before breakfast    MEDICATIONS  (PRN):  acetaminophen     Tablet .. 650 milliGRAM(s) Oral every 6 hours PRN Temp greater or equal to 38C (100.4F), Mild Pain (1 - 3), Moderate Pain (4 - 6)  aluminum hydroxide/magnesium hydroxide/simethicone Suspension 30 milliLiter(s) Oral every 4 hours PRN Dyspepsia  dextrose Oral Gel 15 Gram(s) Oral once PRN Blood Glucose LESS THAN 70 milliGRAM(s)/deciliter  guaifenesin/dextromethorphan Oral Liquid 20 milliLiter(s) Oral every 8 hours PRN Cough  melatonin 3 milliGRAM(s) Oral at bedtime PRN Insomnia  oxyCODONE    IR 5 milliGRAM(s) Oral every 6 hours PRN Severe Pain (7 - 10)

## 2023-10-02 NOTE — PROGRESS NOTE ADULT - SUBJECTIVE AND OBJECTIVE BOX
Podiatry Progress Note    Subjective:  MAUREEN CASTAÑEDA is a  53y Male.   Seen bedside.   Patient is a 53y old  Male who presents with a chief complaint of Foot (02 Oct 2023 14:53)      Past Medical History and Surgical History  PAST MEDICAL & SURGICAL HISTORY:  Diabetes      Hypertension      History of penile disorder      Gallstones      History of eye surgery      History of penile implant      H/O foot surgery           Objective:  Vital Signs Last 24 Hrs  T(C): 36.4 (02 Oct 2023 13:12), Max: 37 (01 Oct 2023 21:48)  T(F): 97.6 (02 Oct 2023 13:12), Max: 98.6 (01 Oct 2023 21:48)  HR: 80 (02 Oct 2023 13:12) (80 - 82)  BP: 140/85 (02 Oct 2023 13:12) (140/85 - 150/70)  BP(mean): --  RR: 18 (02 Oct 2023 13:12) (18 - 19)  SpO2: --    Physical Exam - Lower Extremity Focused:   Derm:   R 1st interspace surgical site well approximated with skin edges well coapted; sutures in place holding tension without signs of dehiscence or periwound necrosis.  No drainage. No malodors.    Partial thickness ulceration on the medial aspect of the L 3rd digit with 50:50 fibrogranular wound base.   Vascular: DP and PT Pulses Diminished; Foot is Warm to Warm to the touch; Capillary Refill Time < 3 Seconds;    Neuro: Protective Sensation Diminished / Moderately Neuropathic   MSK: No Pain On Palpation at Wound Site     Assessment:  s/p R 2nd digit amputation; DOS: 09/29/23; stable     Plan:  Chart reviewed and Patient evaluated. All Questions and Concerns Addressed and Answered  Local Wound Care; Wound Flushed w/ NS; Wound dressed w/ Betadine Soaked Gauze / DSD / Kerlix / ACE  Weight Bearing Status; WBAT w/ Heel Touch w/ Surgical Shoe;   Continue w/ Local Wound Care; Q24 Dressing Changes;  No Further Sx Debridement;   MRI reviewed pt would like to proceed with PICC.   Patient Stable Per Podiatry Standpoint; Follow Up as an Outpatient   Discussed Plan w/ Attending; Dr. Fisher     Podiatry                       Podiatry Progress Note    Subjective:  MAUREEN CASTAÑEDA is a  53y Male.   Seen bedside.   Patient is a 53y old  Male who presents with a chief complaint of Foot (02 Oct 2023 14:53)      Past Medical History and Surgical History  PAST MEDICAL & SURGICAL HISTORY:  Diabetes      Hypertension      History of penile disorder      Gallstones      History of eye surgery      History of penile implant      H/O foot surgery           Objective:  Vital Signs Last 24 Hrs  T(C): 36.4 (02 Oct 2023 13:12), Max: 37 (01 Oct 2023 21:48)  T(F): 97.6 (02 Oct 2023 13:12), Max: 98.6 (01 Oct 2023 21:48)  HR: 80 (02 Oct 2023 13:12) (80 - 82)  BP: 140/85 (02 Oct 2023 13:12) (140/85 - 150/70)  BP(mean): --  RR: 18 (02 Oct 2023 13:12) (18 - 19)  SpO2: --    Physical Exam - Lower Extremity Focused:   Derm:   L 1st interspace surgical site well approximated with skin edges well coapted; sutures in place holding tension without signs of dehiscence or periwound necrosis.  No drainage. No malodors.    Partial thickness ulceration on the medial aspect of the L 3rd digit with 50:50 fibrogranular wound base.   Vascular: DP and PT Pulses Diminished; Foot is Warm to Warm to the touch; Capillary Refill Time < 3 Seconds;    Neuro: Protective Sensation Diminished / Moderately Neuropathic   MSK: No Pain On Palpation at Wound Site     Assessment:  s/p L 2nd digit amputation; DOS: 09/29/23; stable     Plan:  Chart reviewed and Patient evaluated. All Questions and Concerns Addressed and Answered  Local Wound Care; Wound Flushed w/ NS; Wound dressed w/ Betadine Soaked Gauze / DSD / Kerlix / ACE  Weight Bearing Status; WBAT w/ Heel Touch w/ Surgical Shoe;   Continue w/ Local Wound Care; Q24 Dressing Changes;  No Further Sx Debridement;   MRI reviewed pt would like to proceed with PICC.   Patient Stable Per Podiatry Standpoint; Follow Up as an Outpatient   Discussed Plan w/ Attending; Dr. Fisher     Podiatry

## 2023-10-03 ENCOUNTER — TRANSCRIPTION ENCOUNTER (OUTPATIENT)
Age: 53
End: 2023-10-03

## 2023-10-03 LAB
GLUCOSE BLDC GLUCOMTR-MCNC: 105 MG/DL — HIGH (ref 70–99)
GLUCOSE BLDC GLUCOMTR-MCNC: 91 MG/DL — SIGNIFICANT CHANGE UP (ref 70–99)
SURGICAL PATHOLOGY STUDY: SIGNIFICANT CHANGE UP

## 2023-10-03 PROCEDURE — 99232 SBSQ HOSP IP/OBS MODERATE 35: CPT

## 2023-10-03 PROCEDURE — 36573 INSJ PICC RS&I 5 YR+: CPT

## 2023-10-03 RX ORDER — CEFEPIME 1 G/1
2 INJECTION, POWDER, FOR SOLUTION INTRAMUSCULAR; INTRAVENOUS
Qty: 252 | Refills: 0
Start: 2023-10-03 | End: 2023-11-13

## 2023-10-03 RX ORDER — CEFEPIME 1 G/1
2000 INJECTION, POWDER, FOR SOLUTION INTRAMUSCULAR; INTRAVENOUS EVERY 8 HOURS
Refills: 0 | Status: DISCONTINUED | OUTPATIENT
Start: 2023-10-03 | End: 2023-10-04

## 2023-10-03 RX ORDER — LOSARTAN/HYDROCHLOROTHIAZIDE 100MG-25MG
1 TABLET ORAL
Refills: 0 | DISCHARGE

## 2023-10-03 RX ORDER — LOSARTAN POTASSIUM 100 MG/1
1 TABLET, FILM COATED ORAL
Qty: 0 | Refills: 0 | DISCHARGE
Start: 2023-10-03

## 2023-10-03 RX ADMIN — CEFEPIME 100 MILLIGRAM(S): 1 INJECTION, POWDER, FOR SOLUTION INTRAMUSCULAR; INTRAVENOUS at 06:19

## 2023-10-03 RX ADMIN — LOSARTAN POTASSIUM 50 MILLIGRAM(S): 100 TABLET, FILM COATED ORAL at 06:19

## 2023-10-03 RX ADMIN — CEFEPIME 100 MILLIGRAM(S): 1 INJECTION, POWDER, FOR SOLUTION INTRAMUSCULAR; INTRAVENOUS at 21:09

## 2023-10-03 RX ADMIN — CEFEPIME 100 MILLIGRAM(S): 1 INJECTION, POWDER, FOR SOLUTION INTRAMUSCULAR; INTRAVENOUS at 13:01

## 2023-10-03 NOTE — DISCHARGE NOTE PROVIDER - HOSPITAL COURSE
53 yr/o male with PMH of DM and HTN presented with complaints of worsening L 2nd toe infection. Patient presented to ED a week ago where he noticed sloughing of skin and drainage from the L 2nd toe. At that time, the wound did not probe to bone or show any other concern of infection and was prescribed PO bactrim and keflex. Patient followed up in Dr. Fisher outpatient and was told to return to ED for surgical intervention. As per patient he noticed a small ulcer/wound on the left second toe 2 weeks ago, which was growing in size with foul smelling drainage. He said that he had amputation of the right second toe with Dr Fisher before. He was seen by Podiatry team in OP clinic and was sent to ED for debridement which was done on 9/29.     Patient states he has been dressing the wound daily with betadine, gauze and Kerlix He denied any fevers, chills, N/V, Headaches, diarrhea constipation.     Culture of Abscess with Gram stain showed moderate gram negative rods 9/26    Podiatry Intervention 9/29  s/p R 2nd digit amputation; DOS: 09/29/23; stable   Patient Stable Per Podiatry Standpoint; Follow Up as an Outpatient     Antibiotics:  s/p Levofloxacin and Cefepime in hospital.     MRI on 10/01  1.  Third toe PIP septic arthritis and surrounding osteomyelitis.  2.  Status post amputation of the second toe.  3.  Stress reaction noted within the cuneiforms.    Infectious Disease Recommendations  6 Week course of cefepime till 11/10 through a PICC line.    Interventional Radiology consulted for a PICC line.

## 2023-10-03 NOTE — DISCHARGE NOTE NURSING/CASE MANAGEMENT/SOCIAL WORK - NSDCVIVACCINE_GEN_ALL_CORE_FT
Tdap; 19-Aug-2019 09:38; Katherine Benz ([facility]); [employer]; [id number] (Exp. Date: 15-May-2021); IntraMuscular; Deltoid Right.; 0.5 milliLiter(s); VIS (VIS Published: 09-May-2013, VIS Presented: 19-Aug-2019);

## 2023-10-03 NOTE — DISCHARGE NOTE PROVIDER - NSDCMRMEDTOKEN_GEN_ALL_CORE_FT
gabapentin 300 mg oral capsule: 1 cap(s) orally once a day as needed for tingling  losartan-hydrochlorothiazide 50 mg-12.5 mg oral tablet: 1 tab(s) orally once a day  losartan-hydrochlorothiazide 50 mg-12.5 mg oral tablet: 1 tab(s) orally once a day  metFORMIN 750 mg oral tablet, extended release: 1 tab(s) orally once a day   cefepime 2 g intravenous injection: 2 gram(s) intravenous every 8 hours End Date: 11/10  gabapentin 300 mg oral capsule: 1 cap(s) orally once a day as needed for tingling  losartan 50 mg oral tablet: 1 tab(s) orally once a day  losartan-hydrochlorothiazide 50 mg-12.5 mg oral tablet: 1 tab(s) orally once a day  metFORMIN 750 mg oral tablet, extended release: 1 tab(s) orally once a day

## 2023-10-03 NOTE — DISCHARGE NOTE PROVIDER - PROVIDER TOKENS
PROVIDER:[TOKEN:[[id number]],FOLLOWUP:[2 weeks]] PROVIDER:[TOKEN:[[id number]],FOLLOWUP:[2 weeks]],PROVIDER:[TOKEN:[[id number]],FOLLOWUP:[1 week]]

## 2023-10-03 NOTE — DISCHARGE NOTE PROVIDER - CARE PROVIDER_API CALL
Roby Mcgill  Internal Medicine  [address] [place] [address]  Phone: [phone]  Fax: [phone]  Follow Up Time: 2 weeks   Roby Mcgill  Internal Medicine  [address] [place] [address]  Phone: [phone]  Fax: [phone]  Follow Up Time: 2 weeks    Shad Fisher  Podiatric Medicine and Surgery  [address], [address], [address]  [place] [address]  Phone: [phone]  Fax: [phone]  Follow Up Time: 1 week

## 2023-10-03 NOTE — PROGRESS NOTE ADULT - ASSESSMENT
PHYSICAL EXAM:    GENERAL:   ( x ) NAD, lying in bed comfortably     (  ) obtunded     (  ) lethargic     (  ) somnolent    HEAD:   ( x ) Atraumatic     (  ) hematoma     (  ) laceration (specify location:       )     NECK:  ( x ) Supple     (  ) neck stiffness     (  ) nuchal rigidity     (  )  no JVD     (  ) JVD present ( -- cm)    HEART:  Rate -->     ( x ) normal rate     (  ) bradycardic     (  ) tachycardic  Rhythm -->     ( x ) regular     (  ) regularly irregular     (  ) irregularly irregular  Murmurs -->     ( x ) normal s1s2     (  ) systolic murmur     (  ) diastolic murmur     (  ) continuous murmur      (  ) S3 present     (  ) S4 present    LUNGS:   ( x ) Unlabored respirations     (  ) tachypnea  (  ) B/L air entry     (  ) decreased breath sounds in:  (location     )    ( x ) no adventitious sound     (  ) crackles     (  ) wheezing      (  ) rhonchi      (specify location:       )  (  ) chest wall tenderness (specify location:       )    ABDOMEN:   ( x ) Soft     (  ) tense   |   (  ) nondistended     (  ) distended      ( x ) +BS     (  ) hypoactive bowel sounds     (  ) hyperactive bowel sounds  ( x ) nontender     (  ) RUQ tenderness     (  ) RLQ tenderness     (  ) LLQ tenderness     (  ) epigastric tenderness     (  ) diffuse tenderness  (  ) Splenomegaly      (  ) Hepatomegaly      (  ) Jaundice     (  ) ecchymosis     EXTREMITIES:  ( x ) Normal     (  ) Rash     (  ) ecchymosis     (  ) varicose veins      (  ) pitting edema     (  ) non-pitting edema   (  ) ulceration     (  ) gangrene:     (location:     )    NERVOUS SYSTEM:    ( x ) A&Ox3     (  ) confused     (  ) lethargic  CN II-XII:     ( x ) Intact     (  ) deficits found     (Specify:     )   Upper extremities:     ( x ) no sensorimotor deficits     (  ) weakness     (  ) loss of proprioception/vibration     (  ) loss of touch/temperature (specify:    )  Lower extremities:     ( x ) no sensorimotor deficits     (  ) weakness     (  ) loss of proprioception/vibration     (  ) loss of touch/temperature (specify:    )    SKIN:   ( x ) No rashes or lesions     (  ) maculopapular rash     (  ) pustules     (  ) vesicles     (  ) ulcer     (  ) ecchymosis     (specify location:     )    (  ) Indwelling Pierre Catheter:   Date insterted:    Reason (  ) Critical illness     (  ) urinary retention    (  ) Accurate Ins/Outs Monitoring     (  ) CMO patient    ASSESSMENT:  53 yr/o male with PMH of DM and HTN who presented complaint of worsening L 2nd toe infection. As per patient he noticed a small ulcer/wound on the left second toe 2 weeks ago, which was growing in size with foul smelling drainage. S/p amputation of the right second toe with Dr Fisher. He was seen by Podiatry team in OP clinic and was sent to ED for debridement.   Patient states he has been dressing the wound daily with betadine, gauze and Kerlix He denied any fevers, chills, N/V, Headaches, diarrhea constipation.     PLAN:  #  DFU/ PVD  - Underwent Excision debridement of soft tissue and bone with second digit amputation on 9/29 with clear surgical margins   - MRI showed septic arthritis and OM of the left 3rd toe  - Surgical Pathology Report:   1.  Left foot second toe:  -  Gangrene.  -  Underlying bone showing acute osteomyelitis.  -  Resection margin is viable.  2.  Left foot metatarsal bone:  -  Articular bone showing no significant diagnostic abnormality.  - ID recommended   Continue cefepime 2g q 8 hours X 6 weeks  PICC line inserted today     # DM  - diabetes is well controlled   - carb consistent diet   - monitor finger stick   -  SS    # CKD stage 3a  - stable now   - avoid nephrotoxic agent  - monitor BUN/cr and urine output     # HTN  - DASH/ carb consistent diet   - on losartan     Bowel  - Feeds: Carb consistent  - Protonics: Pantoprazole  - Regimen: None    Activity: Weight Bearing Status; as tolerated ( discussed with podiatry at the bedside), knee scooter recommended   Code status: Full  DVT prophylaxis: Heparin  IV fluids: None  O2 support: None  Antibiotics: Cefepime  Disposition: Home    TO FOLLOW UP:

## 2023-10-03 NOTE — PROGRESS NOTE ADULT - SUBJECTIVE AND OBJECTIVE BOX
53 yr/o male with PMH of DM and HTN who was seen bedside in the ED today for complaint of worsening L 2nd toe infection. He was seen by Podiatry team in OP clinic and was sent to ED for debridement which is scheduled to be on Friday.    Today pt is not in pain, asking  about discharge, has no specific complaints.     PAST MEDICAL & SURGICAL HISTORY  Diabetes    Hypertension    History of penile disorder    Gallstones    History of eye surgery    History of penile implant    H/O foot surgery    SOCIAL HISTORY:  Social History:    ALLERGIES:  No Known Allergies    VITALS:   T(C): 36.1 (03 Oct 2023 13:52), Max: 36.7 (02 Oct 2023 20:22)  T(F): 97 (03 Oct 2023 13:52), Max: 98 (02 Oct 2023 20:22)  HR: 81 (03 Oct 2023 13:52) (71 - 81)  BP: 139/84 (03 Oct 2023 13:52) (139/84 - 144/73)  BP(mean): --  RR: 18 (03 Oct 2023 13:52) (18 - 19)      PHYSICAL EXAM:  Appearance: NAD	   Cardiovascular: Normal S1 S2  Respiratory: Lungs clear to auscultation	  Gastrointestinal:  Soft, Non-tender, positive BS  Extremities: dressing noted on left foot, chronic venostasis noted, left more than right   Neurologic: AAOx3, no focal deficit       LABS:             no new labs today     Culture - Abscess with Gram Stain (collected 26 Sep 2023 14:01)  Source: .Abscess medial aspect L third digit  Gram Stain (27 Sep 2023 05:00):    No polymorphonuclear cells seen per low power field    Moderate Gram Negative Rods seen per oil power field  Preliminary Report (27 Sep 2023 21:04):    Numerous Pseudomonas aeruginosa      Culture - Abscess with Gram Stain (09.26.23 @ 14:01)   - Tobramycin: S <=2  - Gentamicin: S <=2  - Imipenem: S <=1  - Levofloxacin: R >4  - Meropenem: S <=1  - Piperacillin/Tazobactam: S 16  Gram Stain:   No polymorphonuclear cells seen per low power field   Moderate Gram Negative Rods seen per oil power field  - Amikacin: S <=16  - Aztreonam: I 16  - Cefepime: S 4  - Ceftazidime: S 4  - Ciprofloxacin: R >2  Specimen Source: .Abscess medial aspect L third digit  Culture Results:   Numerous Pseudomonas aeruginosa  Organism Identification: Pseudomonas aeruginosa  Organism: Pseudomonas aeruginosa  Method Type: KATHLEEN  RADIOLOGY:  < from: VA Duplex Lower Extrem Arterial, Bilat (09.26.23 @ 15:10) >  Impression:    Moderate right posterior tibial and peroneal artery stenosis.    Normal arterial flow in the left lower extremity.    < end of copied text >  < from: VA Duplex Lower Ext Vein Scan, Bilat (09.26.23 @ 15:08) >  IMPRESSION:  No evidence of deep venous thrombosis in either lower extremity.    < end of copied text >  < from: Xray Foot AP + Lateral + Oblique, Left (09.26.23 @ 11:32) >  Findings/  impression:    Limited study, particularly of the forefoot, cannot properly evaluate for   osteomyelitis. Apparent erosive change of the distal portion of the third   proximal phalanx, compatiblewith osteomyelitis in the appropriate   clinical setting. Correlate with exam and consider MR as warranted.   Hallux valgus deformity. Plantar calcaneal spurring. Vascular   calcifications.    < from: MR Foot No Cont, Left (10.01.23 @ 18:04) >    IMPRESSION:  1.  Third toe PIP septic arthritis and surrounding osteomyelitis.  2.  Status post amputation of the second toe.  3.  Stress reaction noted within the cuneiforms.    < end of copied text >      MEDICATIONS  (STANDING):  cefepime   IVPB 2000 milliGRAM(s) IV Intermittent every 8 hours  chlorhexidine 2% Cloths 1 Application(s) Topical <User Schedule>  dextrose 5%. 1000 milliLiter(s) (50 mL/Hr) IV Continuous <Continuous>  dextrose 50% Injectable 25 Gram(s) IV Push once  gabapentin 300 milliGRAM(s) Oral every 8 hours  glucagon  Injectable 1 milliGRAM(s) IntraMuscular once  heparin   Injectable 5000 Unit(s) SubCutaneous every 8 hours  insulin lispro (ADMELOG) corrective regimen sliding scale   SubCutaneous three times a day before meals  losartan 50 milliGRAM(s) Oral daily  pantoprazole    Tablet 40 milliGRAM(s) Oral before breakfast    MEDICATIONS  (PRN):  acetaminophen     Tablet .. 650 milliGRAM(s) Oral every 6 hours PRN Temp greater or equal to 38C (100.4F), Mild Pain (1 - 3), Moderate Pain (4 - 6)  aluminum hydroxide/magnesium hydroxide/simethicone Suspension 30 milliLiter(s) Oral every 4 hours PRN Dyspepsia  dextrose Oral Gel 15 Gram(s) Oral once PRN Blood Glucose LESS THAN 70 milliGRAM(s)/deciliter  guaifenesin/dextromethorphan Oral Liquid 20 milliLiter(s) Oral every 8 hours PRN Cough  melatonin 3 milliGRAM(s) Oral at bedtime PRN Insomnia  oxyCODONE    IR 5 milliGRAM(s) Oral every 6 hours PRN Severe Pain (7 - 10)

## 2023-10-03 NOTE — DISCHARGE NOTE NURSING/CASE MANAGEMENT/SOCIAL WORK - PATIENT PORTAL LINK FT
You can access the FollowMyHealth Patient Portal offered by [facility] by registering at the following website: [email or web address]. By joining [facility]’s FollowMyHealth portal, you will also be able to view your health information using other applications (apps) compatible with our system.

## 2023-10-03 NOTE — DISCHARGE NOTE PROVIDER - NSDCCPCAREPLAN_GEN_ALL_CORE_FT
PRINCIPAL DISCHARGE DIAGNOSIS  Diagnosis: Toe ulcer  Assessment and Plan of Treatment: You presented with complaints of ulcer in the right 2nd toe. Cultures were sent which showed bacterial infection. Podiatry team performed debridment of the 2nd toe with amputation. MRI was done which showed signs of septic arthritis which means a severe infection of the bone. Infectious disease were consulted regarding the proper antibiotics and you will be discharged to complete a 6 weeks course of the antibiotics through a PICC line which was placed by IR. Please continue with all the medications and be on regular followup with podiatry and infectious disease.

## 2023-10-03 NOTE — DISCHARGE NOTE PROVIDER - NSDCCPGOAL_GEN_ALL_CORE_FT
To get better and follow your care plan as instructed. Mixed Superficial And Nodular Bcc Histology Text: The dermis contains distinctive tumor cell masses of various shapes and sizes composed of cells with large oval or elongated nuclei with relatively little cytoplasm.  The nuclei are homogenous.  There is no pronounced variation in size or intensity of staining and no significant anaplastic appearance.  The cells at the outer aspect of the tumor masses show palisading of nuclei.  Tumor masses are surrounded by a connective tissue stroma and in some areas there is retraction artifact of the tumor nodule away from the surrounding stroma.  A mixed superficial and nodular pattern is noted.

## 2023-10-03 NOTE — PROGRESS NOTE ADULT - ASSESSMENT
53 yr/o male with PMH of DM and HTN who was seen bedside in the ED today for complaint of worsening L 2nd toe infection. As per patient he noticed a small ulcer/wound on the left second toe 2 weeks ago, which was growing in size with foul smelling drainage. He said that he had amputation of the right second toe with Dr Fisher before. He was seen by Podiatry team in OP clinic and was sent to ED for debridement which is scheduled to be on Friday.   Patient presented to ED a week ago where he noticed sloughing of skin and drainage from the L 2nd toe. At that time, the wound did not probe to bone or show any other concern of infection and was prescribed PO bactrim and keflex. Patient followed up in Dr. Fisher outpatient clinic yesterday and was told to return to ED for surgical intervention.  Patient states he has been dressing the wound daily with betadine, gauze and Kerlix He denied any fevers, chills, N/V, Headaches, diarrhea constipation.       A/P  #  DFU/ PVD/ DM  - consulted by podiatry, underwent surgical  debridement with 2nd toe  amputation on 9/29 with clear surgical margins   - an MRI showed septic arthritis and OM of the left 3rd toe, pt needs podiatry and ID follow up, pt might need surgical resection or long term Abx and PICC line  - sensitivity report noted, case d/w ID attending,  Abx changed to Cefepime 2 gm Q 12 hours on 9/30  - ID recommended long term Abx, PICC line inserted today   -  continue cefepime 2g q 8 hours   - given septic arthritis/OM of third toe PIP, will plan 6 week course (9/30-11/10)  - Weekly CBC, CMP, ESR/CRP  - ID follow-up with Dr. Martell Benoit for Telehealth.   - MRI of the left foot noted   - Weight Bearing Status; as tolerated ( discussed with podiatry at the bedside), knee  scooter recommended   - diabetes is well controlled   - carb consistent diet   - monitor finger stick   -  SS    # CKD stage 3a  - stable now   - avoid nephrotoxic agent  - monitor BUN/cr and urine output     # HTN  - DASH/ carb consistent diet   - on losartan     # GI/DVT prophylaxis.    #Progress Note Handoff  Pending (specify): anticipate discharge in 24 hours with long term Abx via PICC line   Family discussion: I spoke with pt, he agreed with a plan of care   Disposition: Home__x _/SNF___/Other________/Unknown at this time________

## 2023-10-03 NOTE — DISCHARGE NOTE PROVIDER - NSDCFUADDAPPT_GEN_ALL_CORE_FT
- Weekly CBC, CMP, ESR/CRP  - ID follow-up with Dr. Martell Benoit for Telehealth. We will call the patient between 10:30-1:30      [id number]       [phone]       Fax [phone]

## 2023-10-03 NOTE — PROCEDURE NOTE - ADDITIONAL PROCEDURE DETAILS
Image-guided placement of 5fr 43cm BARD powerPICC in left upper extremity vein. Tip at cavoatrial junction. Flushes and aspirates well. Okay to use immediately.

## 2023-10-03 NOTE — DISCHARGE NOTE NURSING/CASE MANAGEMENT/SOCIAL WORK - NSDCFUADDAPPT_GEN_ALL_CORE_FT
- Weekly CBC, CMP, ESR/CRP  - ID follow-up with Dr. Martell Benoit for Telehealth. We will call the patient between 10:30-1:30      [id number]       [phone]       Fax [phone]   4 = No assist / stand by assistance

## 2023-10-03 NOTE — PROGRESS NOTE ADULT - PROVIDER SPECIALTY LIST ADULT
Hospitalist
Hospitalist
Podiatry
Hospitalist
Internal Medicine
Internal Medicine
Infectious Disease
Internal Medicine
Internal Medicine
Podiatry
Hospitalist
Hospitalist

## 2023-10-03 NOTE — DISCHARGE NOTE PROVIDER - NSDCADMDATE_GEN_ALL_CORE_FT
Received request via: Pharmacy    Was the patient seen in the last year in this department? Yes  4/30/20  Does the patient have an active prescription (recently filled or refills available) for medication(s) requested? No  
26-Sep-2023 13:21

## 2023-10-03 NOTE — PROGRESS NOTE ADULT - SUBJECTIVE AND OBJECTIVE BOX
24H events:    Patient is a 53y old Male who presents with a chief complaint of Foot (03 Oct 2023 17:19)    Primary diagnosis of Toe ulcer        PAST MEDICAL & SURGICAL HISTORY  Diabetes    Hypertension    History of penile disorder    Gallstones    History of eye surgery    History of penile implant    H/O foot surgery      SOCIAL HISTORY:  Social History:      ALLERGIES:  No Known Allergies      VITALS:   T(F): 97  HR: 81  BP: 139/84  RR: 18  SpO2: --    LABS:                            HOME MEDICATIONS:  gabapentin 300 mg oral capsule: 1 cap(s) orally once a day as needed for tingling  losartan 50 mg oral tablet: 1 tab(s) orally once a day  losartan-hydrochlorothiazide 50 mg-12.5 mg oral tablet: 1 tab(s) orally once a day  metFORMIN 750 mg oral tablet, extended release: 1 tab(s) orally once a day      MEDICATIONS:  STANDING MEDICATIONS  cefepime   IVPB 2000 milliGRAM(s) IV Intermittent every 8 hours  chlorhexidine 2% Cloths 1 Application(s) Topical <User Schedule>  dextrose 5%. 1000 milliLiter(s) IV Continuous <Continuous>  dextrose 50% Injectable 25 Gram(s) IV Push once  gabapentin 300 milliGRAM(s) Oral every 8 hours  glucagon  Injectable 1 milliGRAM(s) IntraMuscular once  heparin   Injectable 5000 Unit(s) SubCutaneous every 8 hours  insulin lispro (ADMELOG) corrective regimen sliding scale   SubCutaneous three times a day before meals  losartan 50 milliGRAM(s) Oral daily  pantoprazole    Tablet 40 milliGRAM(s) Oral before breakfast    PRN MEDICATIONS  acetaminophen     Tablet .. 650 milliGRAM(s) Oral every 6 hours PRN  aluminum hydroxide/magnesium hydroxide/simethicone Suspension 30 milliLiter(s) Oral every 4 hours PRN  dextrose Oral Gel 15 Gram(s) Oral once PRN  guaifenesin/dextromethorphan Oral Liquid 20 milliLiter(s) Oral every 8 hours PRN  melatonin 3 milliGRAM(s) Oral at bedtime PRN  oxyCODONE    IR 5 milliGRAM(s) Oral every 6 hours PRN

## 2023-10-04 VITALS
TEMPERATURE: 98 F | RESPIRATION RATE: 18 BRPM | HEART RATE: 71 BPM | SYSTOLIC BLOOD PRESSURE: 133 MMHG | DIASTOLIC BLOOD PRESSURE: 64 MMHG

## 2023-10-04 LAB
CULTURE RESULTS: SIGNIFICANT CHANGE UP
GLUCOSE BLDC GLUCOMTR-MCNC: 99 MG/DL — SIGNIFICANT CHANGE UP (ref 70–99)
ORGANISM # SPEC MICROSCOPIC CNT: SIGNIFICANT CHANGE UP
ORGANISM # SPEC MICROSCOPIC CNT: SIGNIFICANT CHANGE UP
SPECIMEN SOURCE: SIGNIFICANT CHANGE UP

## 2023-10-04 RX ADMIN — LOSARTAN POTASSIUM 50 MILLIGRAM(S): 100 TABLET, FILM COATED ORAL at 06:12

## 2023-10-04 RX ADMIN — CEFEPIME 100 MILLIGRAM(S): 1 INJECTION, POWDER, FOR SOLUTION INTRAMUSCULAR; INTRAVENOUS at 06:12

## 2023-10-12 ENCOUNTER — APPOINTMENT (OUTPATIENT)
Dept: PODIATRY | Facility: CLINIC | Age: 53
End: 2023-10-12
Payer: MEDICARE

## 2023-10-12 ENCOUNTER — OUTPATIENT (OUTPATIENT)
Dept: OUTPATIENT SERVICES | Facility: HOSPITAL | Age: 53
LOS: 1 days | End: 2023-10-12
Payer: MEDICARE

## 2023-10-12 ENCOUNTER — EMERGENCY (EMERGENCY)
Facility: HOSPITAL | Age: 53
LOS: 0 days | Discharge: HOME | End: 2023-10-12
Attending: EMERGENCY MEDICINE
Payer: MEDICARE

## 2023-10-12 VITALS
WEIGHT: 210.1 LBS | TEMPERATURE: 98 F | RESPIRATION RATE: 18 BRPM | HEART RATE: 84 BPM | HEIGHT: 77 IN | DIASTOLIC BLOOD PRESSURE: 87 MMHG | OXYGEN SATURATION: 98 % | SYSTOLIC BLOOD PRESSURE: 138 MMHG

## 2023-10-12 DIAGNOSIS — Z98.890 OTHER SPECIFIED POSTPROCEDURAL STATES: Chronic | ICD-10-CM

## 2023-10-12 DIAGNOSIS — T82.594A OTHER MECHANICAL COMPLICATION OF INFUSION CATHETER, INITIAL ENCOUNTER: ICD-10-CM

## 2023-10-12 DIAGNOSIS — Z79.84 LONG TERM (CURRENT) USE OF ORAL HYPOGLYCEMIC DRUGS: ICD-10-CM
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
## 2023-10-12 DIAGNOSIS — Z00.00 ENCOUNTER FOR GENERAL ADULT MEDICAL EXAMINATION WITHOUT ABNORMAL FINDINGS: ICD-10-CM

## 2023-10-12 DIAGNOSIS — Z87.19 PERSONAL HISTORY OF OTHER DISEASES OF THE DIGESTIVE SYSTEM: ICD-10-CM

## 2023-10-12 DIAGNOSIS — E11.9 TYPE 2 DIABETES MELLITUS WITHOUT COMPLICATIONS: ICD-10-CM

## 2023-10-12 DIAGNOSIS — Z96.0 PRESENCE OF UROGENITAL IMPLANTS: Chronic | ICD-10-CM

## 2023-10-12 DIAGNOSIS — I10 ESSENTIAL (PRIMARY) HYPERTENSION: ICD-10-CM

## 2023-10-12 DIAGNOSIS — Z98.890 OTHER SPECIFIED POSTPROCEDURAL STATES: ICD-10-CM

## 2023-10-12 PROCEDURE — 96374 THER/PROPH/DIAG INJ IV PUSH: CPT | Mod: XU

## 2023-10-12 PROCEDURE — 71045 X-RAY EXAM CHEST 1 VIEW: CPT | Mod: 26

## 2023-10-12 PROCEDURE — 11042 DBRDMT SUBQ TIS 1ST 20SQCM/<: CPT

## 2023-10-12 PROCEDURE — 71045 X-RAY EXAM CHEST 1 VIEW: CPT

## 2023-10-12 PROCEDURE — 99284 EMERGENCY DEPT VISIT MOD MDM: CPT

## 2023-10-12 PROCEDURE — 99284 EMERGENCY DEPT VISIT MOD MDM: CPT | Mod: 25

## 2023-10-12 RX ORDER — CEFEPIME 1 G/1
2000 INJECTION, POWDER, FOR SOLUTION INTRAMUSCULAR; INTRAVENOUS ONCE
Refills: 0 | Status: COMPLETED | OUTPATIENT
Start: 2023-10-12 | End: 2023-10-12

## 2023-10-12 RX ADMIN — CEFEPIME 100 MILLIGRAM(S): 1 INJECTION, POWDER, FOR SOLUTION INTRAMUSCULAR; INTRAVENOUS at 16:27

## 2023-10-12 NOTE — CONSULT NOTE ADULT - SUBJECTIVE AND OBJECTIVE BOX
INTERVENTIONAL RADIOLOGY CONSULT:     Procedure Requested:     HPI:  53yoM Hx of DM, HTN with recent admission for left toe septic arthritis and osteomyelitis, presenting to the ED for inability to flush PICC line. ID recommended Cefepime 2g q8 for a 6 week course, ending 11/10.     PAST MEDICAL & SURGICAL HISTORY:  Diabetes    Hypertension    History of penile disorder    Gallstones    History of eye surgery    History of penile implant    H/O foot surgery      MEDICATIONS  (STANDING):     MEDICATIONS  (PRN):    Allergies    No Known Allergies    Intolerances    Social History:   NA    FAMILY HISTORY:  Family history of diabetes mellitus (DM) (Mother)    Physical Exam:   Vital Signs Last 24 Hrs  T(C): 36.4 (12 Oct 2023 15:08), Max: 36.4 (12 Oct 2023 15:08)  T(F): 97.6 (12 Oct 2023 15:08), Max: 97.6 (12 Oct 2023 15:08)  HR: 84 (12 Oct 2023 15:08) (84 - 84)  BP: 138/87 (12 Oct 2023 15:08) (138/87 - 138/87)  BP(mean): --  RR: 18 (12 Oct 2023 15:08) (18 - 18)  SpO2: 98% (12 Oct 2023 15:08) (98% - 98%)    Parameters below as of 12 Oct 2023 15:08  Patient On (Oxygen Delivery Method): room air      GENERAL: Resting comfortably in bed. NAD.  NEURO: Alert and oriented x3.  CARDIAC: Normal heart rate.  RESPIRATORY: Breathing comfortably on room air.  ABDOMEN: Soft, nontender.  EXTREMITIES: No peripheral edema.    Radiology & Additional Studies:     Radiology imaging reviewed.       ASSESSMENT AND PLAN:  53yoM Hx of DM, HTN with recent admission for left toe septic arthritis and osteomyelitis, presenting to the ED for inability to flush PICC line. ID recommended Cefepime 2g q8 for a 6 week course, ending 11/10 (4 weeks + 1 day remaining). IR consulted for evaluation of PICC.     - PICC flushed with saline in medallion syringe and now flushes easily without any resistance. IV antibiotic connected to PICC line was running without issues  - Instructed patient to call IR at [phone] if further issues with PICC arises.  - Please obtain chest x-ray to evaluate PICC positioning.    Thank you for the courtesy of this consult, please call [phone] with any further questions.    INTERVENTIONAL RADIOLOGY CONSULT:     Procedure Requested:     HPI:  53yoM Hx of DM, HTN with recent admission for left toe septic arthritis and osteomyelitis, presenting to the ED for inability to flush PICC line. ID recommended Cefepime 2g q8 for a 6 week course, ending 11/10.     PAST MEDICAL & SURGICAL HISTORY:  Diabetes    Hypertension    History of penile disorder    Gallstones    History of eye surgery    History of penile implant    H/O foot surgery      MEDICATIONS  (STANDING):     MEDICATIONS  (PRN):    Allergies    No Known Allergies    Intolerances    Social History:   NA    FAMILY HISTORY:  Family history of diabetes mellitus (DM) (Mother)    Physical Exam:   Vital Signs Last 24 Hrs  T(C): 36.4 (12 Oct 2023 15:08), Max: 36.4 (12 Oct 2023 15:08)  T(F): 97.6 (12 Oct 2023 15:08), Max: 97.6 (12 Oct 2023 15:08)  HR: 84 (12 Oct 2023 15:08) (84 - 84)  BP: 138/87 (12 Oct 2023 15:08) (138/87 - 138/87)  BP(mean): --  RR: 18 (12 Oct 2023 15:08) (18 - 18)  SpO2: 98% (12 Oct 2023 15:08) (98% - 98%)    Parameters below as of 12 Oct 2023 15:08  Patient On (Oxygen Delivery Method): room air      GENERAL: Resting comfortably in bed. NAD.  NEURO: Alert and oriented x3.  CARDIAC: Normal heart rate.  RESPIRATORY: Breathing comfortably on room air.  ABDOMEN: Soft, nontender.  EXTREMITIES: No peripheral edema.    Radiology & Additional Studies:     Radiology imaging reviewed.       ASSESSMENT AND PLAN:  53yoM Hx of DM, HTN with recent admission for left toe septic arthritis and osteomyelitis, presenting to the ED for inability to flush PICC line. ID recommended Cefepime 2g q8 for a 6 week course, ending 11/10 (4 weeks + 1 day remaining). IR consulted for evaluation of PICC.     - PICC flushed with saline medallion syringe and now flushes easily without any resistance. IV antibiotic connected to PICC line was running without issues.  - Instructed patient to call IR at [phone] if further issues with PICC arises. Patient to return to IR as outpatient.   - Please obtain chest x-ray to evaluate PICC positioning.    Thank you for the courtesy of this consult, please call [phone] with any further questions.

## 2023-10-12 NOTE — ED PROVIDER NOTE - CHIEF COMPLAINT
The patient is a 53y Male complaining of  The patient is a 53y Male complaining of PICC line problems

## 2023-10-12 NOTE — ED PROVIDER NOTE - ATTENDING CONTRIBUTION TO CARE
53-year-old male to ED with PICC line malfunction.  Patient is on PICC line for infection to his foot.  Otherwise well-appearing nontoxic.

## 2023-10-12 NOTE — ED PROVIDER NOTE - PHYSICAL EXAMINATION
_  GENERAL: Well nourished, comfortable  SKIN: Warm, dry; +LLE in clean and dry dressing  HEAD: NCAT  EYES: Clear conjunctiva   ENT: MMM  NECK: Supple  CARD: No JVD, no cyanosis  RESP: Speaking in full sentences; symmetric rise and fall of chest  EXT: Pulses palpable distally; +LUE with PICC in place without surrounding swelling  NEURO: Grossly intact  PSYCH: AAOx3, cooperative, appropriate

## 2023-10-12 NOTE — ED ADULT NURSE NOTE - CHIEF COMPLAINT QUOTE
Patient presents to ED c/o clogged picc line in left arm x 1 day. Patient unable to take IV cefepime following foot surgery last week.

## 2023-10-12 NOTE — ED PROVIDER NOTE - PATIENT PORTAL LINK FT
You can access the FollowMyHealth Patient Portal offered by [facility] by registering at the following website: [email or web address]. By joining [facility]’s FollowMyHealth portal, you will also be able to view your health information using other applications (apps) compatible with our system.

## 2023-10-12 NOTE — ED PROVIDER NOTE - OBJECTIVE STATEMENT
Patient is an 53-year-old man with a history of hypertension, diabetes, with a recent admission for chronic diabetic foot wounds (9/26-10/3), and he is now status post right second toe amputation by podiatry, and was discharged home on cefepime by [facility] for left third toe septic arthritis and osteomyelitis.  Patient states that his last IV antibiotic dose was yesterday evening, and this morning he was unable to access his PICC line as it no longer flushes.  Patient was seen by podiatry Dr. Fisher shortly prior to arrival to the ED and just had a dressing change.  He is presenting to the ED for malfunctioning PICC line. Patient is an 53-year-old man with a history of hypertension, diabetes, with a recent admission for chronic diabetic foot wounds (9/26-10/3), and he is now status post right second toe amputation by podiatry, and was discharged home on cefepime by [facility] for left third toe septic arthritis and osteomyelitis.  Patient states that his last IV antibiotic dose was yesterday evening, and this morning he was unable to access his PICC line as it no longer flushes.  Patient was seen by podiatry Dr. Fisher shortly prior to arrival to the ED and just had a dressing change.  He is presenting to the ED for malfunctioning PICC line.  No other complaints - no fever, CP, SOB, cough, abd pain, vomiting, trauma.

## 2023-10-12 NOTE — ED PROVIDER NOTE - NSFOLLOWUPINSTRUCTIONS_ED_ALL_ED_FT
PICC Home Care Guide    A peripherally inserted central catheter (PICC) is a form of IV access that allows medicines and IV fluids to be quickly put into the blood and spread throughout the body. The PICC is a long, thin, flexible tube (catheter) that is put into a vein in a person's arm or leg. The catheter ends in a large vein just outside the heart called the superior vena cava (SVC). After the PICC is put in, a chest X-ray may be done to make sure that it is in the right place.    A PICC may be placed for different reasons, such as:  To give medicines and liquid nutrition.  To give IV fluids and blood products.  To take blood samples often.  If there is trouble placing a peripheral intravenous (PIV) catheter.  If cared for properly, a PICC can remain in place for many months. Having a PICC can allow you to go home from the hospital sooner and continue treatment at home. Medicines and PICC care can be managed at home by a family member, caregiver, or home health care team.    What are the risks?  Generally, having a PICC is safe. However, problems may occur, including:  A blood clot (thrombus) forming in or at the end of the PICC.  A blood clot forming in a vein (deep vein thrombosis) or traveling to the lung (pulmonary embolism).  Inflammation of the vein (phlebitis) in which the PICC is placed.  Infection at the insertion site or in the blood. Blood infections from central lines, like PICCs, can be serious and often require a hospital stay.  PICC malposition, or PICC movement or poor placement.  A break or cut in the PICC. Do not use scissors near the PICC.  Nerve or tendon irritation or injury during PICC insertion.  How to care for your PICC  Please follow the specific guidelines provided by your health care provider.    Preventing infection  You and any caregivers should wash your hands often with soap and water for at least 20 seconds. Wash hands:  Before touching the PICC or the infusion device.  Before changing a bandage (dressing). Do not change the dressing unless you have been taught to do so and have shown you are able to change it safely.  Flush the PICC as told. Tell your health care provider right away if the PICC is hard to flush or does not flush. Do not use force to flush the PICC.  Use clean and germ-free (sterile) supplies only. Keep the supplies in a dry place. Do not reuse needles, syringes, or any other supplies. Reusing supplies can lead to infection.  Keep the PICC dressing dry and secure it with tape if the edges stop sticking to your skin.  Check your PICC insertion site every day for signs of infection. Check for:  Redness, swelling, or pain.  Fluid or blood.  Warmth.  Pus or a bad smell.    Preventing other problems  Do not use a syringe that is less than 10 mL to flush the PICC.  Do not have your blood pressure checked on the arm in which the PICC is placed.  Do not ever pull or tug on the PICC. Keep it secured to your arm with tape or a stretch wrap when not in use.  Do not take the PICC out yourself. Only a trained health care provider should remove the PICC.  Keep pets and children away from your PICC.    How to care for your PICC dressing  Keep your PICC dressing clean and dry to prevent infection.  Do not take baths, swim, or use a hot tub until your health care provider approves.  When you are allowed to shower:  Cover the PICC with clear plastic wrap and tape to keep it dry while showering.  Follow instructions from your health care provider about how to take care of your insertion site and dressing. Make sure you:  Wash your hands with soap and water for at least 20 seconds before and after you change your dressing. If soap and water are not available, use hand .  Change your dressing only if taught to do so by your health care provider. Your PICC dressing needs to be changed if it becomes loose or wet.  Leave stitches (sutures), skin glue, or adhesive strips in place. These skin closures may need to stay in place for 2 weeks or longer. If adhesive strip edges start to loosen and curl up, you may trim the loose edges. Do not remove adhesive strips completely unless your health care provider tells you to do that.    Follow these instructions at home:  Disposal of supplies  Throw away any syringes in a disposal container that is meant for sharp items (sharps container). You can buy a sharps container from a pharmacy, or you can make one by using an empty, hard plastic bottle with a lid.  Place any used dressings or infusion bags into a plastic bag. Throw that bag in the trash.    General instructions  A medical alert bracelet on a person's wrist.  Always carry your PICC identification card or wear a medical alert bracelet.  Keep the tube clamped at all times, unless it is being used.  Always carry a smooth-edge clamp with you to clamp the PICC if it breaks.  Do not use scissors or sharp objects near the tube.  You may bend your arm and move it freely. If your PICC is near or at the bend of your elbow, avoid activity with repeated motion at the elbow.  Avoid lifting heavy objects as told by your health care provider.  Keep all follow-up visits. This is important. You will need to have your PICC dressing changed at least once a week.    Contact a health care provider if:  You have pain in your arm, ear, face, or teeth.  You have a fever or chills.  You have redness, swelling, or pain around the insertion site.  You have fluid or blood coming from the insertion site.  Your insertion site feels warm to the touch.  You have pus or a bad smell coming from the insertion site.  Your skin feels hard and raised around the insertion site.  Your PICC dressing has gotten wet or is coming off and you have not been taught how to change it.    Get help right away if:  You have problems with your PICC, such as your PICC:  Was tugged or pulled and has partially come out. Do not push the PICC back in.  Cannot be flushed, is hard to flush, or leaks around the insertion site when it is flushed.  Makes a flushing sound when it is flushed.  Appears to have a hole or tear.  Is accidentally pulled all the way out. If this happens, cover the insertion site with a gauze dressing. Do not throw the PICC away. Your health care provider will need to check it to be sure the entire catheter came out.  You feel your heart racing or skipping beats, or you have chest pain.  You have shortness of breath or trouble breathing.  You have swelling, redness, warmth, or pain in the arm in which the PICC is placed.  You have a red streak going up your arm that starts under the PICC dressing.  These symptoms may be an emergency. Get help right away. Call 911.  Do not wait to see if the symptoms will go away.  Do not drive yourself to the hospital.    Summary  A peripherally inserted central catheter (PICC) is a long, thin, flexible tube (catheter) that is put into a vein in the arm or leg.  If cared for properly, a PICC can remain in place for many months. Having a PICC can allow you to go home from the hospital sooner and continue treatment at home.  The PICC is inserted using a germ-free (sterile) technique by a specially trained health care provider. Only a trained health care provider should remove it.  Do not have your blood pressure checked on the arm in which your PICC is placed.  Always keep your PICC identification card with you.

## 2023-10-12 NOTE — ED PROVIDER NOTE - CARE PLAN
1 Principal Discharge DX:	Complication associated with peripherally inserted central catheter (PICC)

## 2023-10-12 NOTE — ED ADULT NURSE NOTE - NSFALLUNIVINTERV_ED_ALL_ED
Bed/Stretcher in lowest position, wheels locked, appropriate side rails in place/Call bell, personal items and telephone in reach/Instruct patient to call for assistance before getting out of bed/chair/stretcher/Non-slip footwear applied when patient is off stretcher/[place] to call system/Physically safe environment - no spills, clutter or unnecessary equipment/Purposeful proactive rounding/Room/bathroom lighting operational, light cord in reach

## 2023-10-12 NOTE — ED PROVIDER NOTE - PROGRESS NOTE DETAILS
Resident OMKAR Isbell: IR consulted via Teams at 3:41, pending response. Attempted to speak with them directly, will call [phone]. Antibiotics ordered. Resident OMKAR Isbell: IR was able to troubleshoot the PICC (flushed with 1 mL syringe). IR also provided the patient with IR's contact information if he has trouble with it at home. Requested CXR for placement of the PICC (but patient can go home anyway). Resident OMKAR Isbell: Attempted to flush PICC via 5mL syringe without success. IR consulted via Teams at 3:41, pending response. Attempted to speak with them directly, will call [phone]. Antibiotics ordered.

## 2023-10-16 ENCOUNTER — APPOINTMENT (OUTPATIENT)
Dept: VASCULAR SURGERY | Facility: CLINIC | Age: 53
End: 2023-10-16
Payer: MEDICARE

## 2023-10-16 VITALS — HEIGHT: 77 IN | BODY MASS INDEX: 24.79 KG/M2 | WEIGHT: 210 LBS

## 2023-10-16 PROCEDURE — 99203 OFFICE O/P NEW LOW 30 MIN: CPT

## 2023-10-16 RX ORDER — METFORMIN HYDROCHLORIDE 625 MG/1
TABLET ORAL
Refills: 0 | Status: ACTIVE | COMMUNITY

## 2023-10-16 RX ORDER — LOSARTAN POTASSIUM 100 MG/1
TABLET, FILM COATED ORAL
Refills: 0 | Status: ACTIVE | COMMUNITY

## 2023-10-17 NOTE — CDI QUERY NOTE - NSCDIOTHERTXTBX_GEN_ALL_CORE_HH
Query:                                                 For proper understanding of the procedural details,  please clarify the procedure performed on the second left toe can be further specified as:  • On 9/29, the patient had amputation of the left second toe by disarticulation of the digit at the level of metatarsophalangeal joint with excision of the head of the second metatarsal bone  •On 9/29 the patient had amputation of the left second toe by disarticulation of the digit at the level of metatarsophalangeal joint without excision of the head of the second metatarsal bone  • Other (Please specify)            ==================================  Clinical Indicators:  ** 9/29 Operative report:       Procedure: Next, attention was directed to the left second digit where a gangrenous left second digit with full-thickness ulceration in the planter aspect was noted. Next, using a #15 blade, a circumferential incision was made around the second digit *............* going down to the level if bone. Next, tendinous and soft tissue attachments *............* nonviable soft tissue was removed *............* granular base.     ** 9/29 Brief Operative note:       ·  PROCEDURES: Amputation, toe, at MTP joint 29-Sep-2023 15:55:50 Excisional Debridement of Soft Tissue and Bone with 2nd digit amputation, Left foot Paz Bui.    ** 9/29 Pathology report: Final Diagnosis      1.  Left foot second toe:        -  Gangrene.        -  Underlying bone showing acute osteomyelitis.     2.  Left foot metatarsal bone:        -  Articular bone showing no significant diagnostic abnormality.  Gross Description    1. The specimen is received in formalin, labeled "left foot second toe" and consists of a tan to gray toe, measuring 7 x 2 x 1.9 cm. The skin has diffuse dark discoloration. The surgical margin is grossly viable. Representative sections are submitted after decalcification.    2. The specimen is received in formalin, labeled "left foot metatarsal bone" and consist of an elongated fragment of irregular tan to pink bone, measuring 0.7 x 0.2 x 0.2 cm.    ** 9/29 Xray foot:       Impression: Post amputation of the second toe to the MTP joint

## 2023-10-18 NOTE — CHART NOTE - NSCHARTNOTEFT_GEN_A_CORE
on 9/29, the patient had amputation of the left second toe by disarticulation of the digit at the level of metatarsophalangeal joint with a bone specimen from the second metatarsal head.

## 2023-10-19 ENCOUNTER — APPOINTMENT (OUTPATIENT)
Dept: PODIATRY | Facility: CLINIC | Age: 53
End: 2023-10-19
Payer: MEDICARE

## 2023-10-19 ENCOUNTER — OUTPATIENT (OUTPATIENT)
Dept: OUTPATIENT SERVICES | Facility: HOSPITAL | Age: 53
LOS: 1 days | End: 2023-10-19
Payer: MEDICARE

## 2023-10-19 DIAGNOSIS — Z98.890 OTHER SPECIFIED POSTPROCEDURAL STATES: Chronic | ICD-10-CM

## 2023-10-19 DIAGNOSIS — Z96.0 PRESENCE OF UROGENITAL IMPLANTS: Chronic | ICD-10-CM

## 2023-10-19 DIAGNOSIS — Z00.00 ENCOUNTER FOR GENERAL ADULT MEDICAL EXAMINATION WITHOUT ABNORMAL FINDINGS: ICD-10-CM

## 2023-10-19 PROCEDURE — 11042 DBRDMT SUBQ TIS 1ST 20SQCM/<: CPT

## 2023-10-19 PROCEDURE — 11042 DBRDMT SUBQ TIS 1ST 20SQCM/<: CPT | Mod: LT

## 2023-10-26 ENCOUNTER — OUTPATIENT (OUTPATIENT)
Dept: OUTPATIENT SERVICES | Facility: HOSPITAL | Age: 53
LOS: 1 days | End: 2023-10-26
Payer: MEDICARE

## 2023-10-26 ENCOUNTER — RESULT REVIEW (OUTPATIENT)
Age: 53
End: 2023-10-26

## 2023-10-26 ENCOUNTER — APPOINTMENT (OUTPATIENT)
Dept: PODIATRY | Facility: CLINIC | Age: 53
End: 2023-10-26
Payer: MEDICARE

## 2023-10-26 DIAGNOSIS — Z98.890 OTHER SPECIFIED POSTPROCEDURAL STATES: Chronic | ICD-10-CM

## 2023-10-26 DIAGNOSIS — Z96.0 PRESENCE OF UROGENITAL IMPLANTS: Chronic | ICD-10-CM

## 2023-10-26 DIAGNOSIS — M79.672 PAIN IN LEFT FOOT: ICD-10-CM

## 2023-10-26 DIAGNOSIS — Z00.00 ENCOUNTER FOR GENERAL ADULT MEDICAL EXAMINATION WITHOUT ABNORMAL FINDINGS: ICD-10-CM

## 2023-10-26 PROCEDURE — 73630 X-RAY EXAM OF FOOT: CPT | Mod: 26,LT

## 2023-10-26 PROCEDURE — 11042 DBRDMT SUBQ TIS 1ST 20SQCM/<: CPT | Mod: LT

## 2023-10-26 PROCEDURE — 11042 DBRDMT SUBQ TIS 1ST 20SQCM/<: CPT
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
## 2023-10-27 DIAGNOSIS — M79.672 PAIN IN LEFT FOOT: ICD-10-CM

## 2023-10-27 DIAGNOSIS — L97.529 NON-PRESSURE CHRONIC ULCER OF OTHER PART OF LEFT FOOT WITH UNSPECIFIED SEVERITY: ICD-10-CM

## 2023-10-27 DIAGNOSIS — E11.42 TYPE 2 DIABETES MELLITUS WITH DIABETIC POLYNEUROPATHY: ICD-10-CM
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
## 2023-11-02 ENCOUNTER — OUTPATIENT (OUTPATIENT)
Dept: OUTPATIENT SERVICES | Facility: HOSPITAL | Age: 53
LOS: 1 days | End: 2023-11-02
Payer: MEDICARE

## 2023-11-02 ENCOUNTER — APPOINTMENT (OUTPATIENT)
Dept: PODIATRY | Facility: CLINIC | Age: 53
End: 2023-11-02
Payer: MEDICARE

## 2023-11-02 DIAGNOSIS — Z00.00 ENCOUNTER FOR GENERAL ADULT MEDICAL EXAMINATION WITHOUT ABNORMAL FINDINGS: ICD-10-CM

## 2023-11-02 DIAGNOSIS — Z98.890 OTHER SPECIFIED POSTPROCEDURAL STATES: Chronic | ICD-10-CM

## 2023-11-02 DIAGNOSIS — Z96.0 PRESENCE OF UROGENITAL IMPLANTS: Chronic | ICD-10-CM

## 2023-11-02 PROCEDURE — 99213 OFFICE O/P EST LOW 20 MIN: CPT

## 2023-11-03 DIAGNOSIS — L97.509 NON-PRESSURE CHRONIC ULCER OF OTHER PART OF UNSPECIFIED FOOT WITH UNSPECIFIED SEVERITY: ICD-10-CM

## 2023-11-03 DIAGNOSIS — E11.42 TYPE 2 DIABETES MELLITUS WITH DIABETIC POLYNEUROPATHY: ICD-10-CM
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
## 2023-11-06 DIAGNOSIS — L97.509 NON-PRESSURE CHRONIC ULCER OF OTHER PART OF UNSPECIFIED FOOT WITH UNSPECIFIED SEVERITY: ICD-10-CM

## 2023-11-06 DIAGNOSIS — E11.42 TYPE 2 DIABETES MELLITUS WITH DIABETIC POLYNEUROPATHY: ICD-10-CM
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
## 2023-11-09 ENCOUNTER — APPOINTMENT (OUTPATIENT)
Dept: PODIATRY | Facility: CLINIC | Age: 53
End: 2023-11-09
Payer: MEDICARE

## 2023-11-09 ENCOUNTER — OUTPATIENT (OUTPATIENT)
Dept: OUTPATIENT SERVICES | Facility: HOSPITAL | Age: 53
LOS: 1 days | End: 2023-11-09
Payer: MEDICARE

## 2023-11-09 DIAGNOSIS — Z98.890 OTHER SPECIFIED POSTPROCEDURAL STATES: Chronic | ICD-10-CM

## 2023-11-09 DIAGNOSIS — Z96.0 PRESENCE OF UROGENITAL IMPLANTS: Chronic | ICD-10-CM

## 2023-11-09 DIAGNOSIS — Z00.00 ENCOUNTER FOR GENERAL ADULT MEDICAL EXAMINATION WITHOUT ABNORMAL FINDINGS: ICD-10-CM

## 2023-11-09 PROCEDURE — 11042 DBRDMT SUBQ TIS 1ST 20SQCM/<: CPT

## 2023-11-10 DIAGNOSIS — L97.529 NON-PRESSURE CHRONIC ULCER OF OTHER PART OF LEFT FOOT WITH UNSPECIFIED SEVERITY: ICD-10-CM

## 2023-11-10 DIAGNOSIS — M79.672 PAIN IN LEFT FOOT: ICD-10-CM
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
## 2023-11-10 DIAGNOSIS — E11.42 TYPE 2 DIABETES MELLITUS WITH DIABETIC POLYNEUROPATHY: ICD-10-CM

## 2023-11-16 ENCOUNTER — OUTPATIENT (OUTPATIENT)
Dept: OUTPATIENT SERVICES | Facility: HOSPITAL | Age: 53
LOS: 1 days | End: 2023-11-16
Payer: MEDICARE

## 2023-11-16 ENCOUNTER — APPOINTMENT (OUTPATIENT)
Dept: PODIATRY | Facility: CLINIC | Age: 53
End: 2023-11-16
Payer: MEDICARE

## 2023-11-16 DIAGNOSIS — Z96.0 PRESENCE OF UROGENITAL IMPLANTS: Chronic | ICD-10-CM

## 2023-11-16 DIAGNOSIS — Z98.890 OTHER SPECIFIED POSTPROCEDURAL STATES: Chronic | ICD-10-CM

## 2023-11-16 DIAGNOSIS — Z00.00 ENCOUNTER FOR GENERAL ADULT MEDICAL EXAMINATION WITHOUT ABNORMAL FINDINGS: ICD-10-CM

## 2023-11-16 PROCEDURE — 11042 DBRDMT SUBQ TIS 1ST 20SQCM/<: CPT

## 2023-11-16 PROCEDURE — 11042 DBRDMT SUBQ TIS 1ST 20SQCM/<: CPT | Mod: LT
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
## 2023-11-17 DIAGNOSIS — E11.42 TYPE 2 DIABETES MELLITUS WITH DIABETIC POLYNEUROPATHY: ICD-10-CM

## 2023-11-17 DIAGNOSIS — L97.509 NON-PRESSURE CHRONIC ULCER OF OTHER PART OF UNSPECIFIED FOOT WITH UNSPECIFIED SEVERITY: ICD-10-CM

## 2023-11-30 ENCOUNTER — APPOINTMENT (OUTPATIENT)
Dept: PODIATRY | Facility: CLINIC | Age: 53
End: 2023-11-30
Payer: MEDICARE

## 2023-11-30 ENCOUNTER — OUTPATIENT (OUTPATIENT)
Dept: OUTPATIENT SERVICES | Facility: HOSPITAL | Age: 53
LOS: 1 days | End: 2023-11-30
Payer: MEDICARE

## 2023-11-30 DIAGNOSIS — Z96.0 PRESENCE OF UROGENITAL IMPLANTS: Chronic | ICD-10-CM

## 2023-11-30 DIAGNOSIS — Z98.890 OTHER SPECIFIED POSTPROCEDURAL STATES: Chronic | ICD-10-CM

## 2023-11-30 DIAGNOSIS — Z00.00 ENCOUNTER FOR GENERAL ADULT MEDICAL EXAMINATION WITHOUT ABNORMAL FINDINGS: ICD-10-CM

## 2023-11-30 PROCEDURE — 11042 DBRDMT SUBQ TIS 1ST 20SQCM/<: CPT | Mod: 58,LT
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
## 2023-12-01 DIAGNOSIS — L97.529 NON-PRESSURE CHRONIC ULCER OF OTHER PART OF LEFT FOOT WITH UNSPECIFIED SEVERITY: ICD-10-CM
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
## 2023-12-01 DIAGNOSIS — E11.42 TYPE 2 DIABETES MELLITUS WITH DIABETIC POLYNEUROPATHY: ICD-10-CM
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
## 2023-12-12 DIAGNOSIS — E11.42 TYPE 2 DIABETES MELLITUS WITH DIABETIC POLYNEUROPATHY: ICD-10-CM

## 2023-12-12 DIAGNOSIS — L97.509 NON-PRESSURE CHRONIC ULCER OF OTHER PART OF UNSPECIFIED FOOT WITH UNSPECIFIED SEVERITY: ICD-10-CM

## 2023-12-14 ENCOUNTER — APPOINTMENT (OUTPATIENT)
Dept: PODIATRY | Facility: CLINIC | Age: 53
End: 2023-12-14
Payer: MEDICARE

## 2023-12-14 ENCOUNTER — OUTPATIENT (OUTPATIENT)
Dept: OUTPATIENT SERVICES | Facility: HOSPITAL | Age: 53
LOS: 1 days | End: 2023-12-14
Payer: MEDICARE

## 2023-12-14 DIAGNOSIS — L97.529 NON-PRESSURE CHRONIC ULCER OF OTHER PART OF LEFT FOOT WITH UNSPECIFIED SEVERITY: ICD-10-CM

## 2023-12-14 DIAGNOSIS — Z96.0 PRESENCE OF UROGENITAL IMPLANTS: Chronic | ICD-10-CM

## 2023-12-14 DIAGNOSIS — E11.42 TYPE 2 DIABETES MELLITUS WITH DIABETIC POLYNEUROPATHY: ICD-10-CM

## 2023-12-14 DIAGNOSIS — Z00.00 ENCOUNTER FOR GENERAL ADULT MEDICAL EXAMINATION WITHOUT ABNORMAL FINDINGS: ICD-10-CM

## 2023-12-14 DIAGNOSIS — Z98.890 OTHER SPECIFIED POSTPROCEDURAL STATES: Chronic | ICD-10-CM
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
## 2023-12-14 PROCEDURE — 99213 OFFICE O/P EST LOW 20 MIN: CPT | Mod: 24

## 2023-12-14 PROCEDURE — 99213 OFFICE O/P EST LOW 20 MIN: CPT | Mod: 25

## 2023-12-14 RX ORDER — GEL BASE NO.41
GEL (GRAM) MISCELLANEOUS
Qty: 1 | Refills: 0 | Status: ACTIVE | COMMUNITY
Start: 2023-12-14 | End: 1900-01-01

## 2023-12-15 NOTE — HISTORY OF PRESENT ILLNESS
[FreeTextEntry1] : Mr. Souza is a 53 yr/o male who presents to the clinic for a post operative appointment.  Patient is S/P excisional debridement of soft tissue and bone with amputation of L 2nd toe (DOS: 9/29/23).  Patient complains of continued pain and concern of swelling of the surgical site and 3rd digit. States that he has difficulty ambulating secondary to pain.  Currently has picc line and is functioning properly.  Saw vascular last week, blood flow to the lower extremity is good to the heal.  Dressing changes daily with betadine soaked gauze, kerlix and ace bandage.   (11/2/23): Patient notes improvement of surgical site but continued pain to third digit. Patient underwent XRs last week. (11/9/23): continued improvement of the surgical site and third digit. Continued daily dressing changes with betadine. Patient states he has one more week of PICC line (11/16/23): Patient states new skin breakdown of the 3rd, 4th and medial 5th digits. His PCP was concerned that the soaked betadine dressings is cdrying it out too much. No active drainage or malodor. No other pedal complaints. (11/30/23): Pt RTC for continued wound care  (12/14/23): Patient returns to clinic for wound care. Patient continued to use aquacel for dressing changes. Has been applying minimal moisturizer. Notes thickened skin on plantar surface of third toe - concerned for ulcer. No other pedal complaints at this time.

## 2023-12-15 NOTE — END OF VISIT
[] : Resident [Resident] : Resident [FreeTextEntry3] : left dorsal foot wound. no drainage. no acute signs of infection local wound care w/ hydrogel light debridement performed to  left ulcer down to dermal tissue

## 2023-12-15 NOTE — PHYSICAL EXAM
[General Appearance - Alert] : alert [General Appearance - In No Acute Distress] : in no acute distress [General Appearance - Well Nourished] : well nourished [General Appearance - Well Developed] : well developed [Ankle Swelling Bilaterally] : bilaterally  [2+] : left foot dorsalis pedis 2+ [Skin Turgor] : normal skin turgor [Skin Lesions] : no skin lesions [Sensation] : the sensory exam was normal to light touch and pinprick [Diminished Throughout Right Foot] : diminished sensation with monofilament testing throughout right foot [Diminished Throughout Left Foot] : diminished sensation with monofilament testing throughout left foot [Oriented To Time, Place, And Person] : oriented to person, place, and time [Ankle Swelling (On Exam)] : not present [Varicose Veins Of Lower Extremities] : not present [] : not present [Delayed in the Right Toes] : capillary refills normal in right toes [Delayed in the Left Toes] : capillary refills normal in the left toes [de-identified] : Partial R 2nd toe amputation L 2nd toe amputation (9/29/23) [Foot Ulcer] : no foot ulcer [Skin Induration] : no skin induration [FreeTextEntry1] : 12/14/23: continued improvement of surgical site and interspace wounds. No openings. Thickened skin on the plantar aspect of the third digit but after debridement, no openings noted. Dorsal L foot ulceration with hardened fibrotic tissue overlying the wound bed - stable.  11/30/23 - Surgical ulceration of the L 2nd amputation site decreased in size and improvement of maceration compared to last visit. No openings or gappings noted. No malodor or purulent drainage noted. Improvement of the ulceration to the medial aspect of the L 3rd digit, with 70:30 fibrogranular wound base, no active drainage. Decreased in size compared to last visit  Mild edema to the L 3rd digit   -New ulceration on the lateral aspect of the 3rd and medial aspect of the 4th digit, granular wound base. No active drainage. No malodor (11/16) -New ulceration on the lateral aspect of the 4th digit and medial aspect of the 5th digit, granular wound base. No active drainage. No malodor (11/16) -Small openings on the dorsum of the rearfoot, no active bleeding.  Skin b/l feet dry

## 2023-12-15 NOTE — ASSESSMENT
[Verbal] : verbal [Patient] : patient [Good - alert, interested, motivated] : Good - alert, interested, motivated [Demonstrates independently] : demonstrates independently [FreeTextEntry1] : -Patient seen and evaluated in clinic today with alll questions and concerns addressed and answered. -Cleansed and flushed site with NS.  - Wound debrided to level of subcutaneous tissue with # 15 blade -Parring of callus on the plantar aspect of the L 3rd digit, WOI, -Advised patient to apply moisturizer to the foot and toes, to avoid interspaces.  -Patient allowed to shower foot but to dry interspaces and foot well following.  -Rx Hydrogel to be applied to the dorsum wound.  -Continue with dressing of gauze with tape and gauze inbetween interspaces if moisture build up present.  -Patient to return to clinic in two weeks.

## 2023-12-18 ENCOUNTER — APPOINTMENT (OUTPATIENT)
Dept: VASCULAR SURGERY | Facility: CLINIC | Age: 53
End: 2023-12-18
Payer: MEDICARE

## 2023-12-18 VITALS — BODY MASS INDEX: 24.79 KG/M2 | HEIGHT: 77 IN | WEIGHT: 210 LBS

## 2023-12-18 DIAGNOSIS — I70.234 ATHEROSCLEROSIS OF NATIVE ARTERIES OF RIGHT LEG WITH ULCERATION OF HEEL AND MIDFOOT: ICD-10-CM

## 2023-12-18 PROCEDURE — 99212 OFFICE O/P EST SF 10 MIN: CPT

## 2023-12-18 PROCEDURE — 93926 LOWER EXTREMITY STUDY: CPT

## 2023-12-18 NOTE — PHYSICAL EXAM
[Normal Breath Sounds] : Normal breath sounds [Normal Heart Sounds] : normal heart sounds [2+] : left 2+ [Ankle Swelling (On Exam)] : not present [Varicose Veins Of Lower Extremities] : not present

## 2023-12-18 NOTE — ASSESSMENT
[FreeTextEntry1] : The patient is a 53-year-old male with a history of diabetes who developed a left foot second toe ulcer which was nonhealing.  The patient underwent a left foot second toe amputation 3 months  ago.  The wound is healed.  He has a superficial scratch to his left foot which is healing.  I performed a arterial duplex today in my office that shows: Arterial duplex left diffuse atherosclerotic plaque visualized throughout the femoral-popliteal and tibial arteries with no evidence of hemodynamically significant disease from the groin to the tibial peroneal trunk.  The tibial arteries at the level of the foot are patent with no evidence of occlusion and biphasic flow.   I would like to the patient back in my office in 3 months time or sooner if any new symptoms develop.

## 2023-12-18 NOTE — DATA REVIEWED
[FreeTextEntry1] : Arterial duplex left diffuse atherosclerotic plaque visualized throughout the femoral-popliteal and tibial arteries with no evidence of hemodynamically significant disease from the groin to the tibial peroneal trunk.  The tibial arteries at the level of the foot are patent with no evidence of occlusion and biphasic flow.

## 2023-12-18 NOTE — HISTORY OF PRESENT ILLNESS
[FreeTextEntry1] : The patient is a 53-year-old male with a history of diabetes who developed a left foot second toe ulcer which was nonhealing.  The patient underwent a left foot second toe amputation 3 months  ago.  The wound is healed.  He has a superficial scratch to his left foot which is healing.

## 2024-01-30 ENCOUNTER — OUTPATIENT (OUTPATIENT)
Dept: OUTPATIENT SERVICES | Facility: HOSPITAL | Age: 54
LOS: 1 days | End: 2024-01-30
Payer: MEDICARE

## 2024-01-30 ENCOUNTER — APPOINTMENT (OUTPATIENT)
Dept: PODIATRY | Facility: CLINIC | Age: 54
End: 2024-01-30
Payer: MEDICARE

## 2024-01-30 DIAGNOSIS — L97.519 NON-PRESSURE CHRONIC ULCER OF OTHER PART OF RIGHT FOOT WITH UNSPECIFIED SEVERITY: ICD-10-CM

## 2024-01-30 DIAGNOSIS — Z98.890 OTHER SPECIFIED POSTPROCEDURAL STATES: Chronic | ICD-10-CM

## 2024-01-30 DIAGNOSIS — E11.42 TYPE 2 DIABETES MELLITUS WITH DIABETIC POLYNEUROPATHY: ICD-10-CM

## 2024-01-30 DIAGNOSIS — M79.671 PAIN IN RIGHT FOOT: ICD-10-CM

## 2024-01-30 DIAGNOSIS — Z96.0 PRESENCE OF UROGENITAL IMPLANTS: Chronic | ICD-10-CM

## 2024-01-30 DIAGNOSIS — M79.672 PAIN IN LEFT FOOT: ICD-10-CM

## 2024-01-30 DIAGNOSIS — B35.1 TINEA UNGUIUM: ICD-10-CM
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
## 2024-01-30 DIAGNOSIS — M79.673 PAIN IN UNSPECIFIED FOOT: ICD-10-CM

## 2024-01-30 DIAGNOSIS — Z00.00 ENCOUNTER FOR GENERAL ADULT MEDICAL EXAMINATION WITHOUT ABNORMAL FINDINGS: ICD-10-CM

## 2024-01-30 PROCEDURE — 73630 X-RAY EXAM OF FOOT: CPT | Mod: 26,50

## 2024-01-30 PROCEDURE — 11042 DBRDMT SUBQ TIS 1ST 20SQCM/<: CPT

## 2024-01-30 PROCEDURE — 11042 DBRDMT SUBQ TIS 1ST 20SQCM/<: CPT | Mod: 59

## 2024-01-30 PROCEDURE — 11721 DEBRIDE NAIL 6 OR MORE: CPT | Mod: 59

## 2024-01-30 PROCEDURE — 11721 DEBRIDE NAIL 6 OR MORE: CPT | Mod: XU

## 2024-01-31 DIAGNOSIS — M79.673 PAIN IN UNSPECIFIED FOOT: ICD-10-CM

## 2024-01-31 NOTE — ED ADULT NURSE NOTE - NS ED NURSE LEVEL OF CONSCIOUSNESS AFFECT
Left voice message for patient to return call.    Relay: Please tell patient that he needs to come in and have labs done for further refills.  Current cholesterol medication has been called in    Calm

## 2024-02-13 PROBLEM — B35.1 ONYCHOMYCOSIS: Status: ACTIVE | Noted: 2021-03-03

## 2024-02-13 PROBLEM — L97.519 TOE ULCER, RIGHT: Status: ACTIVE | Noted: 2018-04-19

## 2024-02-13 NOTE — PHYSICAL EXAM
[General Appearance - Alert] : alert [General Appearance - In No Acute Distress] : in no acute distress [General Appearance - Well Nourished] : well nourished [General Appearance - Well Developed] : well developed [Ankle Swelling Bilaterally] : bilaterally  [2+] : left foot dorsalis pedis 2+ [Skin Turgor] : normal skin turgor [Skin Lesions] : no skin lesions [Sensation] : the sensory exam was normal to light touch and pinprick [Diminished Throughout Right Foot] : diminished sensation with monofilament testing throughout right foot [Diminished Throughout Left Foot] : diminished sensation with monofilament testing throughout left foot [Oriented To Time, Place, And Person] : oriented to person, place, and time [Ankle Swelling (On Exam)] : not present [Varicose Veins Of Lower Extremities] : not present [] : not present [Delayed in the Left Toes] : capillary refills normal in the left toes [Delayed in the Right Toes] : capillary refills normal in right toes [de-identified] : Partial R 2nd toe amputation L 2nd toe amputation (9/29/23) [Foot Ulcer] : no foot ulcer [Skin Induration] : no skin induration [FreeTextEntry1] : 1/30/24: No open ulcerations noted throughout the rey foot. Mild superficial abrasion to the L anterior ankle. Post Edema hyperpigmentation noted to L forefoot.   12/14/23: continued improvement of surgical site and interspace wounds. No openings. Thickened skin on the plantar aspect of the third digit but after debridement, no openings noted. Dorsal L foot ulceration with hardened fibrotic tissue overlying the wound bed - stable.  11/30/23 - Surgical ulceration of the L 2nd amputation site decreased in size and improvement of maceration compared to last visit. No openings or gappings noted. No malodor or purulent drainage noted. Improvement of the ulceration to the medial aspect of the L 3rd digit, with 70:30 fibrogranular wound base, no active drainage. Decreased in size compared to last visit  Mild edema to the L 3rd digit   -New ulceration on the lateral aspect of the 3rd and medial aspect of the 4th digit, granular wound base. No active drainage. No malodor (11/16) -New ulceration on the lateral aspect of the 4th digit and medial aspect of the 5th digit, granular wound base. No active drainage. No malodor (11/16) -Small openings on the dorsum of the rearfoot, no active bleeding.  Skin b/l feet dry

## 2024-02-13 NOTE — END OF VISIT
[] : Resident [FreeTextEntry3] : left dorsal foot wound. no drainage. no acute signs of infection local wound care w/ hydrogel light debridement performed to  left ulcer down to dermal tissue

## 2024-02-13 NOTE — ASSESSMENT
[Verbal] : verbal [Patient] : patient [Good - alert, interested, motivated] : Good - alert, interested, motivated [Demonstrates independently] : demonstrates independently [FreeTextEntry1] : -Patient seen and evaluated in clinic today with all questions and concerns addressed and answered. -Aseptic debridement of nails with nail nippers, WOI x9.  -Continuation of aquaphor to be applied daily to the foot, avoiding interspaces.  -Continuation of hydrogel to the anterior ankle/dorsum wound.  -Continue with dressing of gauze with tape and gauze inbetween interspaces if moisture build up present.  -RX rey foot XRs, to further assess L 3rd digit numbness/stiffness and swelling.  -Patient to return to clinic in two weeks.

## 2024-02-13 NOTE — HISTORY OF PRESENT ILLNESS
[FreeTextEntry1] : Mr. Souza is a 53 yr/o male who presents to the clinic for a post operative appointment.  Patient is S/P excisional debridement of soft tissue and bone with amputation of L 2nd toe (DOS: 9/29/23).  Patient complains of continued pain and concern of swelling of the surgical site and 3rd digit. States that he has difficulty ambulating secondary to pain.  Currently has picc line and is functioning properly.  Saw vascular last week, blood flow to the lower extremity is good to the heal.  Dressing changes daily with betadine soaked gauze, kerlix and ace bandage.   (11/2/23): Patient notes improvement of surgical site but continued pain to third digit. Patient underwent XRs last week. (11/9/23): continued improvement of the surgical site and third digit. Continued daily dressing changes with betadine. Patient states he has one more week of PICC line (11/16/23): Patient states new skin breakdown of the 3rd, 4th and medial 5th digits. His PCP was concerned that the soaked betadine dressings is cdrying it out too much. No active drainage or malodor. No other pedal complaints. (11/30/23): Pt RTC for continued wound care  (12/14/23): Patient returns to clinic for wound care. Patient continued to use aquacel for dressing changes. Has been applying minimal moisturizer. Notes thickened skin on plantar surface of third toe - concerned for ulcer. No other pedal complaints at this time.  (1/30/24): Patient returns to clinic for continued wound care. Denies having new ulcerations or wounds to the AMY foot but does complain of stiffness to the L 3rd digit and swelling to the L forefoot. Continues to apply aquaphor to feet daily and places gauze inbetween toes to prevent moisture build up. Patient has been applying hydrogel to the anterior ankle wound. No other pedal complaints.

## 2024-03-18 ENCOUNTER — APPOINTMENT (OUTPATIENT)
Dept: PLASTIC SURGERY | Facility: CLINIC | Age: 54
End: 2024-03-18
Payer: MEDICARE

## 2024-03-18 PROCEDURE — 99212 OFFICE O/P EST SF 10 MIN: CPT

## 2024-03-18 NOTE — DATA REVIEWED
[FreeTextEntry1] :  Pathology             Final\par  \par  No Documents Attached\par  \par  \par    [facility] Accession Number : [id number]\par  Patient:   MAUREEN CASTAÑEDA\par  \par  \par  Accession:                             [id number]\par  \par  Collected Date/Time:                   3/16/2023 13:30 EDT\par  Received Date/Time:                    3/16/2023 15:14 EDT\par  \par  Surgical Pathology Report - Auth (Verified)\par  \par  Specimen(s) Submitted\par  1  Right breast tissue\par  Time obtained: 1:30 pm\par  Cold ischemic time <1 hour\par  2  Left breast tissue\par  Time obtained: 1:30 pm\par  Cold ischemic time <1 hour\par  \par  Final Diagnosis\par  \par  1.2.  Right and left breast tissue:\par  -  Gynecomastia.\par  Verified by: Lisa Hopkins M.D.\par  (Electronic Signature)\par  Reported on: 03/27/23 14:58 EDT, [facility],\par  [address], [place] [address]\par  Phone: [phone]   Fax: [phone]\par  _________________________________________________________________\par  \par  \par  Clinical Information\par  Bilateral gynecomastia excision\par  \par  Perioperative Diagnosis\par  Gynecomastia, symptomatic\par  \par  Gross Description\par  1. Specimen received fresh labeled "right breast tissue", consist\par  of a yellow-tan fibroadipose mass along with multiple fragments of\par  fibroadipose tissue weighing 139 g. The larger fibroadipose measures\par  10 x 9 x 5 cm.  Smaller tissue measures 6 x 6 x 1.5 cm in aggregate. On\par  sectioning, cut surface is yellow soft with focal white fibrous area. The\par  outer surface is inked black.  Representative sections are submitted.\par  (10 blocks)\par  \par  2. Specimen received fresh labeled "left breast tissue", consists of a\par  yellow-tan fibroadipose mass weighing 183 g and measuring 11.5 x 10 x\par  3 cm.  The outer surface is inked black. On sectioning, cut surface is\par  yellow soft with focal white fibrous area. Representative sections are\par  submitted.\par  (11 blocks)\par  \par  Specimen was received and underwent gross examination at [facility], [address] [place] [address].\par  \par  \par  03/17/2023 18:32:23 EDT NK\par  \par   \par  \par   Ordered by: ITZ TRINIDAD       Collected/Examined: 16Mar2023 01:30PM       \par  Verification Required       Stage: Final       \par   Performed at: [facility]       Resulted: 27Mar2023 02:58PM       Last Updated: 27Mar2023 02:58PM       Accession: [id number]

## 2024-03-18 NOTE — HISTORY OF PRESENT ILLNESS
[FreeTextEntry1] : 51 yo M with PMHx of HTN, Type II DM last HgA1c 9, , GERD who presents today for evaluation of gynecomastia. Patient endorses having enlarged chest since puberty at 11 with gradual increase in size now c/o asymmetry L>R and left breast discomfort. Patient denies any steroid use but has been taking a lot of Zantc for GERD.  No change in chest size with weight loss in the past.  No prior endocrine workup.   Occupation - on disability for RLE  Social hx - does not smoke cigarettes, no drug use  Denies any h/o DVT/PE or MRSA infections.   PMD Dr. Mcgill   Interval Hx (8/15/22):  Pt reports no new changes in health, L side has remained more tender than R. Believes his sugars have been betters lately has he has made some changes in his diet. Lab results and mammogram reviewed with pt.  Cr high on labs would recommend follow up with PCP. Mammogram reviewed with minimal retroareolar breast tissue  Inteval hx (11/21/22):  Here for pre-op weight loss check and possible surgical planning.  pt lost 25 lbs, he now 217 lbs.  Pt would like to lose an additional 15 lbs.   His HgA1c 4.6.   Interval hx (1/25/23):  here for weight loss check before BL gynecomastia surgery.  he has lost 5 lbs.  he here to confirm surgical treatment.  Interval hx (2/8/23):  here to discuss pre-op questions.    Interval hx (3/20/23). Pt here POD#4 s/p bilateral gynecomastia excision with dermal pedicle, keyhole skin excision for dressing change. Doing well. Reporting on significant pain, f/c or drainage. Drains functional, pt did not bring drain log with him. Compliant with chest compression.   Interval hx (3/23/23). Pt here POD#7 s/p bilateral gynecomastia excision with dermal pedicle, keyhole skin excision. Doing well. Denies any pain, f/c or bleeding. Completed all prescribed medications. Wearing compression vest. Drains Rt 30/30, Lt 10/5  Interval hx (3/27/23): Pt here POD#11 s/p bilateral gynecomastia excision with dermal pedicle, keyhole skin excision. Doing well. Denies any pain, f/c or bleeding. Completed all prescribed medications. Wearing compression vest. Drains Rt 20. Compliant with exercise limitations thus far, wants to go back to gym asap.   Interval hx (4/17/23): Pt presents today 4.5 weeks s/p bilateral gynecomastia excision with dermal pedicle, keyhole skin excision concerned with skin redundancy right lateral chest and length of incisions. Denies any f/c or drainage. Compliant with compression vest.   Interval hx (5/12/23):  2.5 months s/p bilateral gynecomastia excision with dermal pedicle, keyhole skin excision  Interval hx (9/20/23):  6.75 months s/p bilateral gynecomastia excision with dermal pedicle, keyhole skin excision.  Currently, being treated for left DFU on PO abx per Dr. Fisher.   Interval hx (3/18/24) 15.5 months s/p  bilateral gynecomastia excision with dermal pedicle, keyhole skin excision.  c/w Right chest incision not as flat as left chest.

## 2024-03-18 NOTE — ASSESSMENT
[FreeTextEntry1] : 53 yo M with PMHx of DM with Grade 3 gynecomastia of bilateral breasts  L>R. Would like to lose additional weight and get closer to 210lbs (217 lbs today in the office)  15.5 months s/p double-incision keyhole gynecomastia excision with dermal pedicle (NAC). Doing well.  Right chest HTS  - Resume silicone therapy x 3 month right chest incision - Reassurance given - all questions answered - follow up with PMD, endo, vascular and podiatry - f/u PRN if worsening raised scar of right chest

## 2024-03-18 NOTE — PHYSICAL EXAM
[de-identified] : well developed pleasant male, NAD [de-identified] : unlabored breathing [de-identified] : JOSE ER [de-identified] : Bilateral incisions healing well, right incision mild HTS; left no HTS, excellent contour, improving Bl nipple sensation,

## 2024-03-26 NOTE — ED ADULT NURSE NOTE - PAIN: PRESENCE, MLM
Laura Lauren  1974  [id number]    Situation:  Verbal report received from: JOSE ALFREDO Simental   Procedure: Procedure(s):  ESOPHAGOGASTRODUODENOSCOPY  ESOPHAGOGASTRODUODENOSCOPY BIOPSY  COLONOSCOPY DIAGNOSTIC    Background:    Preoperative diagnosis: * No pre-op diagnosis entered *  Postoperative diagnosis: * No post-op diagnosis entered *    :  Dr. Perkins   Assistant(s): Circulator: Adriana Chen RN    Specimens:   ID Type Source Tests Collected by Time Destination   1 : Duodenum biopsies Tissue Duodenum SURGICAL PATHOLOGY Javan Perkins MD 3/26/2024 1309    2 : Gastric biopsies Tissue Gastric SURGICAL PATHOLOGY Javan Perkins MD 3/26/2024 1310    3 : Transverse colon polyp Tissue Colon-Transverse SURGICAL PATHOLOGY Javan Perkins MD 3/26/2024 1319      H. Pylori    no    Assessment:  Intra-procedure medications      Anesthesia gave intra-procedure sedation and medications, see anesthesia flow sheet   yes    Intravenous fluids: NS@ KVO     Vital signs stable   yes    Abdominal assessment: round and soft   yes    Recommendation:  Discharge patient per MD order  yes.  Return to floor  outpatient  Family or Friend   spouse   Permission to share finding with family or friend   yes    denies pain/discomfort
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
## 2024-04-01 ENCOUNTER — APPOINTMENT (OUTPATIENT)
Dept: VASCULAR SURGERY | Facility: CLINIC | Age: 54
End: 2024-04-01
Payer: MEDICARE

## 2024-04-01 VITALS
HEIGHT: 77 IN | WEIGHT: 210 LBS | DIASTOLIC BLOOD PRESSURE: 74 MMHG | BODY MASS INDEX: 24.79 KG/M2 | SYSTOLIC BLOOD PRESSURE: 143 MMHG

## 2024-04-01 DIAGNOSIS — I70.245 ATHEROSCLEROSIS OF NATIVE ARTERIES OF LEFT LEG WITH ULCERATION OF OTHER PART OF FOOT: ICD-10-CM

## 2024-04-01 PROCEDURE — 99213 OFFICE O/P EST LOW 20 MIN: CPT

## 2024-04-01 NOTE — HISTORY OF PRESENT ILLNESS
[FreeTextEntry1] : 54 y/o M with h/o DM, neuropathy, with h/o left foot ulcer, underwent left second toe amputation, wounds are now healed. c/o numbness in the feet, no pain.

## 2024-04-01 NOTE — PHYSICAL EXAM
[1+] : left 1+ [FreeTextEntry1] : No wounds on feet bilaterally, healed left 2nd toe amputation site

## 2024-04-01 NOTE — ASSESSMENT
[FreeTextEntry1] : 54 y/o M with h/o DM, neuropathy, with h/o left foot ulcer, underwent left second toe amputation, wounds are now healed. c/o numbness in the feet, no pain.  Duplex from December 2023 reviewed- no significant arterial disease.  Recommend glycemic control, BP control and routine Podiatry follow up.  Follow up in 6 months.

## 2024-04-23 ENCOUNTER — RESULT REVIEW (OUTPATIENT)
Age: 54
End: 2024-04-23

## 2024-04-23 ENCOUNTER — APPOINTMENT (OUTPATIENT)
Dept: PODIATRY | Facility: CLINIC | Age: 54
End: 2024-04-23
Payer: MEDICARE

## 2024-04-23 ENCOUNTER — OUTPATIENT (OUTPATIENT)
Dept: OUTPATIENT SERVICES | Facility: HOSPITAL | Age: 54
LOS: 1 days | End: 2024-04-23
Payer: MEDICARE

## 2024-04-23 DIAGNOSIS — G89.29 PAIN IN LEFT FOOT: ICD-10-CM

## 2024-04-23 DIAGNOSIS — E11.42 TYPE 2 DIABETES MELLITUS WITH DIABETIC POLYNEUROPATHY: ICD-10-CM

## 2024-04-23 DIAGNOSIS — M79.671 PAIN IN RIGHT FOOT: ICD-10-CM

## 2024-04-23 DIAGNOSIS — Z96.0 PRESENCE OF UROGENITAL IMPLANTS: Chronic | ICD-10-CM

## 2024-04-23 DIAGNOSIS — M21.611 BUNION OF RIGHT FOOT: ICD-10-CM

## 2024-04-23 DIAGNOSIS — G89.29 PAIN IN RIGHT FOOT: ICD-10-CM

## 2024-04-23 DIAGNOSIS — Z98.890 OTHER SPECIFIED POSTPROCEDURAL STATES: Chronic | ICD-10-CM

## 2024-04-23 DIAGNOSIS — M21.612 BUNION OF RIGHT FOOT: ICD-10-CM

## 2024-04-23 DIAGNOSIS — Z00.00 ENCOUNTER FOR GENERAL ADULT MEDICAL EXAMINATION WITHOUT ABNORMAL FINDINGS: ICD-10-CM

## 2024-04-23 DIAGNOSIS — M79.672 PAIN IN LEFT FOOT: ICD-10-CM

## 2024-04-23 LAB
ESTIMATED AVERAGE GLUCOSE: 105 MG/DL
HBA1C MFR BLD HPLC: 5.3 %

## 2024-04-23 PROCEDURE — 73630 X-RAY EXAM OF FOOT: CPT | Mod: 26,LT

## 2024-04-23 PROCEDURE — 73630 X-RAY EXAM OF FOOT: CPT | Mod: LT

## 2024-04-23 PROCEDURE — 83036 HEMOGLOBIN GLYCOSYLATED A1C: CPT

## 2024-04-23 PROCEDURE — 99213 OFFICE O/P EST LOW 20 MIN: CPT | Mod: 25

## 2024-04-23 PROCEDURE — 11721 DEBRIDE NAIL 6 OR MORE: CPT

## 2024-04-24 DIAGNOSIS — M79.672 PAIN IN LEFT FOOT: ICD-10-CM

## 2024-04-24 PROBLEM — M79.671 CHRONIC FOOT PAIN, RIGHT: Status: ACTIVE | Noted: 2017-03-23

## 2024-04-24 PROBLEM — M21.611 BILATERAL BUNIONS: Status: ACTIVE | Noted: 2021-03-03

## 2024-04-24 PROBLEM — E11.42 CONTROLLED DIABETES MELLITUS WITH DIABETIC POLYNEUROPATHY: Status: ACTIVE | Noted: 2018-06-01

## 2024-04-24 NOTE — ASSESSMENT
[FreeTextEntry1] : -Patient seen and evaluated in clinic today with all questions and concerns addressed and answered. -Aseptic debridement of nails with nail nippers, WOI x9.  -- Rx x-ray  for possible bunion surgery   - Hga1c for possible left foot bunionectomy with left third toe skin plasty [Verbal] : verbal [Patient] : patient [Good - alert, interested, motivated] : Good - alert, interested, motivated [Demonstrates independently] : demonstrates independently

## 2024-04-24 NOTE — HISTORY OF PRESENT ILLNESS
[FreeTextEntry1] : Patient returns to clinic for continued wound care. Denies having new ulcerations or wounds to the AMY foot but does complain of stiffness to the L 3rd digit and swelling to the L forefoot. Continues to apply aquaphor to feet daily and places gauze inbetween toes to prevent moisture build up. Patient has been applying hydrogel to the anterior ankle wound. No other pedal complaints.

## 2024-04-24 NOTE — PHYSICAL EXAM
[General Appearance - Alert] : alert [General Appearance - In No Acute Distress] : in no acute distress [General Appearance - Well Nourished] : well nourished [General Appearance - Well Developed] : well developed [Ankle Swelling (On Exam)] : not present [Ankle Swelling Bilaterally] : bilaterally  [Varicose Veins Of Lower Extremities] : bilaterally [Delayed in the Right Toes] : capillary refills normal in right toes [Delayed in the Left Toes] : capillary refills normal in the left toes [2+] : left foot dorsalis pedis 2+ [de-identified] : Partial R 2nd toe amputation L 2nd toe amputation (9/29/23) [Skin Turgor] : normal skin turgor [] : no rash [Skin Lesions] : no skin lesions [Foot Ulcer] : no foot ulcer [Skin Induration] : no skin induration [FreeTextEntry1] : 1/30/24: No open ulcerations noted throughout the rey foot. Mild superficial abrasion to the L anterior ankle. Post Edema hyperpigmentation noted to L forefoot.   12/14/23: continued improvement of surgical site and interspace wounds. No openings. Thickened skin on the plantar aspect of the third digit but after debridement, no openings noted. Dorsal L foot ulceration with hardened fibrotic tissue overlying the wound bed - stable.  11/30/23 - Surgical ulceration of the L 2nd amputation site decreased in size and improvement of maceration compared to last visit. No openings or gappings noted. No malodor or purulent drainage noted. Improvement of the ulceration to the medial aspect of the L 3rd digit, with 70:30 fibrogranular wound base, no active drainage. Decreased in size compared to last visit  Mild edema to the L 3rd digit   -New ulceration on the lateral aspect of the 3rd and medial aspect of the 4th digit, granular wound base. No active drainage. No malodor (11/16) -New ulceration on the lateral aspect of the 4th digit and medial aspect of the 5th digit, granular wound base. No active drainage. No malodor (11/16) -Small openings on the dorsum of the rearfoot, no active bleeding.  Skin b/l feet dry [Sensation] : the sensory exam was normal to light touch and pinprick [Diminished Throughout Right Foot] : diminished sensation with monofilament testing throughout right foot [Diminished Throughout Left Foot] : diminished sensation with monofilament testing throughout left foot [Oriented To Time, Place, And Person] : oriented to person, place, and time

## 2024-04-25 DIAGNOSIS — B35.1 TINEA UNGUIUM: ICD-10-CM
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
## 2024-04-25 DIAGNOSIS — E11.42 TYPE 2 DIABETES MELLITUS WITH DIABETIC POLYNEUROPATHY: ICD-10-CM

## 2024-04-25 DIAGNOSIS — M79.672 PAIN IN LEFT FOOT: ICD-10-CM

## 2024-04-25 DIAGNOSIS — M79.671 PAIN IN RIGHT FOOT: ICD-10-CM
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
## 2024-04-25 DIAGNOSIS — M21.611 BUNION OF RIGHT FOOT: ICD-10-CM

## 2024-05-07 ENCOUNTER — OUTPATIENT (OUTPATIENT)
Dept: OUTPATIENT SERVICES | Facility: HOSPITAL | Age: 54
LOS: 1 days | End: 2024-05-07
Payer: MEDICARE

## 2024-05-07 ENCOUNTER — APPOINTMENT (OUTPATIENT)
Dept: PODIATRY | Facility: CLINIC | Age: 54
End: 2024-05-07
Payer: MEDICARE

## 2024-05-07 DIAGNOSIS — Z98.890 OTHER SPECIFIED POSTPROCEDURAL STATES: Chronic | ICD-10-CM

## 2024-05-07 DIAGNOSIS — M21.612 BUNION OF LEFT FOOT: ICD-10-CM

## 2024-05-07 DIAGNOSIS — Z00.00 ENCOUNTER FOR GENERAL ADULT MEDICAL EXAMINATION WITHOUT ABNORMAL FINDINGS: ICD-10-CM

## 2024-05-07 DIAGNOSIS — L97.529 NON-PRESSURE CHRONIC ULCER OF OTHER PART OF LEFT FOOT WITH UNSPECIFIED SEVERITY: ICD-10-CM

## 2024-05-07 DIAGNOSIS — Z96.0 PRESENCE OF UROGENITAL IMPLANTS: Chronic | ICD-10-CM

## 2024-05-07 PROCEDURE — 99214 OFFICE O/P EST MOD 30 MIN: CPT | Mod: 95

## 2024-05-07 PROCEDURE — 99214 OFFICE O/P EST MOD 30 MIN: CPT

## 2024-05-13 PROBLEM — L97.529: Status: ACTIVE | Noted: 2017-03-23

## 2024-05-13 PROBLEM — M21.612 BUNION OF LEFT FOOT: Status: ACTIVE | Noted: 2024-05-13

## 2024-05-13 NOTE — HISTORY OF PRESENT ILLNESS
[FreeTextEntry1] : Patient returns to clinic for continued wound care. Denies having new ulcerations or wounds to the AMY foot but does complain of stiffness to the L 3rd digit and swelling to the L forefoot. Continues to apply aquaphor to feet daily and places gauze inbetween toes to prevent moisture build up.  Patient would like to discuss left foot surgical intervention for bunion and correction of floating toe left foot  Patient obtain x-rays last clinical visit  Denies any acute injuries

## 2024-05-13 NOTE — ASSESSMENT
[FreeTextEntry1] : -Patient seen and evaluated in clinic today with all questions and concerns addressed and answered. -Discussed with patient possible surgical intervention to correct hallux valgus and floating toe of the left foot -Discussed with patient in detail possible incision sites and possible surgical approach patient is in agreement -Patient understands risks versus benefits -Patient underwent current lab work hemoglobin A1c is within normal limits - will follow up in 2 months and book surgery  [Verbal] : verbal [Patient] : patient [Good - alert, interested, motivated] : Good - alert, interested, motivated [Demonstrates independently] : demonstrates independently

## 2024-05-13 NOTE — PHYSICAL EXAM
[General Appearance - Alert] : alert [General Appearance - In No Acute Distress] : in no acute distress [General Appearance - Well Nourished] : well nourished [General Appearance - Well Developed] : well developed [Ankle Swelling (On Exam)] : not present [Ankle Swelling Bilaterally] : bilaterally  [Varicose Veins Of Lower Extremities] : bilaterally [Delayed in the Right Toes] : capillary refills normal in right toes [Delayed in the Left Toes] : capillary refills normal in the left toes [2+] : left foot dorsalis pedis 2+ [de-identified] : Partial R 2nd toe amputation L 2nd toe amputation (9/29/23) [Skin Turgor] : normal skin turgor [] : no rash [Foot Ulcer] : no foot ulcer [Skin Lesions] : no skin lesions [Skin Induration] : no skin induration [FreeTextEntry1] : 1/30/24: No open ulcerations noted throughout the rey foot. Mild superficial abrasion to the L anterior ankle. Post Edema hyperpigmentation noted to L forefoot.   12/14/23: continued improvement of surgical site and interspace wounds. No openings. Thickened skin on the plantar aspect of the third digit but after debridement, no openings noted. Dorsal L foot ulceration with hardened fibrotic tissue overlying the wound bed - stable.  11/30/23 - Surgical ulceration of the L 2nd amputation site decreased in size and improvement of maceration compared to last visit. No openings or gappings noted. No malodor or purulent drainage noted. Improvement of the ulceration to the medial aspect of the L 3rd digit, with 70:30 fibrogranular wound base, no active drainage. Decreased in size compared to last visit  Mild edema to the L 3rd digit   -New ulceration on the lateral aspect of the 3rd and medial aspect of the 4th digit, granular wound base. No active drainage. No malodor (11/16) -New ulceration on the lateral aspect of the 4th digit and medial aspect of the 5th digit, granular wound base. No active drainage. No malodor (11/16) -Small openings on the dorsum of the rearfoot, no active bleeding.  Skin b/l feet dry [Sensation] : the sensory exam was normal to light touch and pinprick [Diminished Throughout Right Foot] : diminished sensation with monofilament testing throughout right foot [Diminished Throughout Left Foot] : diminished sensation with monofilament testing throughout left foot [Oriented To Time, Place, And Person] : oriented to person, place, and time
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
## 2024-05-17 DIAGNOSIS — L97.529 NON-PRESSURE CHRONIC ULCER OF OTHER PART OF LEFT FOOT WITH UNSPECIFIED SEVERITY: ICD-10-CM

## 2024-05-17 DIAGNOSIS — M21.612 BUNION OF LEFT FOOT: ICD-10-CM
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
## 2024-07-31 NOTE — PATIENT PROFILE ADULT - FUNCTIONAL ASSESSMENT - DAILY ACTIVITY SECTION LABEL
Comment: Pt improved w/ dcn, tret, dap gel. D/c dcn at 1.5 months d/t dog eating rx. Refilled dcn. Cont tret and dap gel, 2 more months dcn, Render Risk Assessment In Note?: no Detail Level: Simple .

## 2024-09-03 ENCOUNTER — OUTPATIENT (OUTPATIENT)
Dept: OUTPATIENT SERVICES | Facility: HOSPITAL | Age: 54
LOS: 1 days | End: 2024-09-03
Payer: MEDICARE

## 2024-09-03 ENCOUNTER — APPOINTMENT (OUTPATIENT)
Dept: PODIATRY | Facility: CLINIC | Age: 54
End: 2024-09-03

## 2024-09-03 DIAGNOSIS — Z98.890 OTHER SPECIFIED POSTPROCEDURAL STATES: Chronic | ICD-10-CM
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
## 2024-09-03 DIAGNOSIS — M21.611 BUNION OF RIGHT FOOT: ICD-10-CM

## 2024-09-03 DIAGNOSIS — Z96.0 PRESENCE OF UROGENITAL IMPLANTS: Chronic | ICD-10-CM

## 2024-09-03 DIAGNOSIS — M79.672 PAIN IN LEFT FOOT: ICD-10-CM
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
## 2024-09-03 DIAGNOSIS — E11.42 TYPE 2 DIABETES MELLITUS WITH DIABETIC POLYNEUROPATHY: ICD-10-CM

## 2024-09-03 DIAGNOSIS — Z00.00 ENCOUNTER FOR GENERAL ADULT MEDICAL EXAMINATION WITHOUT ABNORMAL FINDINGS: ICD-10-CM

## 2024-09-03 DIAGNOSIS — M79.671 PAIN IN RIGHT FOOT: ICD-10-CM

## 2024-09-03 PROCEDURE — 99214 OFFICE O/P EST MOD 30 MIN: CPT

## 2024-09-03 PROCEDURE — 99214 OFFICE O/P EST MOD 30 MIN: CPT | Mod: 95

## 2024-09-03 NOTE — ASSESSMENT
[FreeTextEntry1] : -Patient seen and evaluated in clinic today with all questions and concerns addressed and answered. -Discussed with patient possible surgical intervention to correct hallux valgus and floating toe of the left foot -Discussed with patient in detail possible incision sites and possible surgical approach patient is in agreement -Patient understands risks versus benefits -Patient underwent current lab work hemoglobin A1c is within normal limits Rx; Keflex 500mg BID for 7 days LWC betadine to left plantar 5th toe - will follow up in 2 months  [Verbal] : verbal [Patient] : patient [Good - alert, interested, motivated] : Good - alert, interested, motivated [Demonstrates independently] : demonstrates independently

## 2024-09-03 NOTE — PHYSICAL EXAM
[General Appearance - Alert] : alert [General Appearance - In No Acute Distress] : in no acute distress [General Appearance - Well Nourished] : well nourished [General Appearance - Well Developed] : well developed [Ankle Swelling (On Exam)] : not present [Ankle Swelling Bilaterally] : bilaterally  [Varicose Veins Of Lower Extremities] : bilaterally [Delayed in the Right Toes] : capillary refills normal in right toes [Delayed in the Left Toes] : capillary refills normal in the left toes [2+] : left foot dorsalis pedis 2+ [de-identified] : Partial R 2nd toe amputation L 2nd toe amputation (9/29/23) [Skin Turgor] : normal skin turgor [] : no rash [Skin Lesions] : no skin lesions [Foot Ulcer] : no foot ulcer [Skin Induration] : no skin induration [FreeTextEntry1] : elonagated thickened mycotic nails x10 mIld laceration to left plantar 5th toe; Does not probe <1cm in size. No bleeding. No signs of infection noted.  [Sensation] : the sensory exam was normal to light touch and pinprick [Diminished Throughout Right Foot] : diminished sensation with monofilament testing throughout right foot [Diminished Throughout Left Foot] : diminished sensation with monofilament testing throughout left foot [Oriented To Time, Place, And Person] : oriented to person, place, and time

## 2024-09-03 NOTE — HISTORY OF PRESENT ILLNESS
[FreeTextEntry1] : Patient returns to clinic for continued wound care. Denies having new ulcerations or wounds to the AMY foot but does complain of stiffness to the L 3rd digit and swelling to the L forefoot. Continues to apply aquaphor to feet daily and places gauze inbetween toes to prevent moisture build up.  Patient would like to discuss left foot surgical intervention for bunion and correction of floating toe left foot  Patient obtain x-rays last clinical visit  Denies any acute injuries  9/3 - RTc for continued nail care. Reports left 5th toe wound that was bleeding 2 days ago. Denies fcnvsob.

## 2024-09-24 ENCOUNTER — APPOINTMENT (OUTPATIENT)
Dept: PODIATRY | Facility: CLINIC | Age: 54
End: 2024-09-24

## 2024-10-09 ENCOUNTER — APPOINTMENT (OUTPATIENT)
Dept: VASCULAR SURGERY | Facility: CLINIC | Age: 54
End: 2024-10-09
Payer: MEDICARE

## 2024-10-09 VITALS — WEIGHT: 230 LBS | HEIGHT: 77 IN | BODY MASS INDEX: 27.16 KG/M2

## 2024-10-09 DIAGNOSIS — E11.59 TYPE 2 DIABETES MELLITUS WITH OTHER CIRCULATORY COMPLICATIONS: ICD-10-CM

## 2024-10-09 PROCEDURE — 99212 OFFICE O/P EST SF 10 MIN: CPT

## 2024-10-29 ENCOUNTER — INPATIENT (INPATIENT)
Facility: HOSPITAL | Age: 54
LOS: 0 days | Discharge: HOME | DRG: 287 | End: 2024-10-30
Attending: STUDENT IN AN ORGANIZED HEALTH CARE EDUCATION/TRAINING PROGRAM | Admitting: STUDENT IN AN ORGANIZED HEALTH CARE EDUCATION/TRAINING PROGRAM
Payer: MEDICARE

## 2024-10-29 ENCOUNTER — TRANSCRIPTION ENCOUNTER (OUTPATIENT)
Age: 54
End: 2024-10-29

## 2024-10-29 VITALS
DIASTOLIC BLOOD PRESSURE: 73 MMHG | SYSTOLIC BLOOD PRESSURE: 111 MMHG | RESPIRATION RATE: 18 BRPM | TEMPERATURE: 98 F | HEART RATE: 60 BPM | OXYGEN SATURATION: 100 %

## 2024-10-29 DIAGNOSIS — Z98.890 OTHER SPECIFIED POSTPROCEDURAL STATES: Chronic | ICD-10-CM

## 2024-10-29 DIAGNOSIS — R55 SYNCOPE AND COLLAPSE: ICD-10-CM

## 2024-10-29 DIAGNOSIS — Z96.0 PRESENCE OF UROGENITAL IMPLANTS: Chronic | ICD-10-CM

## 2024-10-29 LAB
ALBUMIN SERPL ELPH-MCNC: 4.3 G/DL — SIGNIFICANT CHANGE UP (ref 3.5–5.2)
ALP SERPL-CCNC: 95 U/L — SIGNIFICANT CHANGE UP (ref 30–115)
ALT FLD-CCNC: 27 U/L — SIGNIFICANT CHANGE UP (ref 0–41)
ANION GAP SERPL CALC-SCNC: 8 MMOL/L — SIGNIFICANT CHANGE UP (ref 7–14)
AST SERPL-CCNC: 55 U/L — HIGH (ref 0–41)
BASOPHILS # BLD AUTO: 0.03 K/UL — SIGNIFICANT CHANGE UP (ref 0–0.2)
BASOPHILS NFR BLD AUTO: 0.6 % — SIGNIFICANT CHANGE UP (ref 0–1)
BILIRUB SERPL-MCNC: 0.4 MG/DL — SIGNIFICANT CHANGE UP (ref 0.2–1.2)
BUN SERPL-MCNC: 30 MG/DL — HIGH (ref 10–20)
CALCIUM SERPL-MCNC: 9.1 MG/DL — SIGNIFICANT CHANGE UP (ref 8.4–10.5)
CHLORIDE SERPL-SCNC: 101 MMOL/L — SIGNIFICANT CHANGE UP (ref 98–110)
CO2 SERPL-SCNC: 27 MMOL/L — SIGNIFICANT CHANGE UP (ref 17–32)
CREAT SERPL-MCNC: 2 MG/DL — HIGH (ref 0.7–1.5)
EGFR: 39 ML/MIN/1.73M2 — LOW
EOSINOPHIL # BLD AUTO: 0.1 K/UL — SIGNIFICANT CHANGE UP (ref 0–0.7)
EOSINOPHIL NFR BLD AUTO: 2.1 % — SIGNIFICANT CHANGE UP (ref 0–8)
GLUCOSE BLDC GLUCOMTR-MCNC: 131 MG/DL — HIGH (ref 70–99)
GLUCOSE SERPL-MCNC: 165 MG/DL — HIGH (ref 70–99)
HCT VFR BLD CALC: 43.2 % — SIGNIFICANT CHANGE UP (ref 42–52)
HGB BLD-MCNC: 13.1 G/DL — LOW (ref 14–18)
IMM GRANULOCYTES NFR BLD AUTO: 0.2 % — SIGNIFICANT CHANGE UP (ref 0.1–0.3)
LYMPHOCYTES # BLD AUTO: 1.32 K/UL — SIGNIFICANT CHANGE UP (ref 1.2–3.4)
LYMPHOCYTES # BLD AUTO: 27.5 % — SIGNIFICANT CHANGE UP (ref 20.5–51.1)
MCHC RBC-ENTMCNC: 24.3 PG — LOW (ref 27–31)
MCHC RBC-ENTMCNC: 30.3 G/DL — LOW (ref 32–37)
MCV RBC AUTO: 80 FL — SIGNIFICANT CHANGE UP (ref 80–94)
MONOCYTES # BLD AUTO: 0.43 K/UL — SIGNIFICANT CHANGE UP (ref 0.1–0.6)
MONOCYTES NFR BLD AUTO: 9 % — SIGNIFICANT CHANGE UP (ref 1.7–9.3)
NEUTROPHILS # BLD AUTO: 2.91 K/UL — SIGNIFICANT CHANGE UP (ref 1.4–6.5)
NEUTROPHILS NFR BLD AUTO: 60.6 % — SIGNIFICANT CHANGE UP (ref 42.2–75.2)
NRBC # BLD: 0 /100 WBCS — SIGNIFICANT CHANGE UP (ref 0–0)
PLATELET # BLD AUTO: 219 K/UL — SIGNIFICANT CHANGE UP (ref 130–400)
PMV BLD: 10.4 FL — SIGNIFICANT CHANGE UP (ref 7.4–10.4)
POTASSIUM SERPL-MCNC: SIGNIFICANT CHANGE UP MMOL/L (ref 3.5–5)
POTASSIUM SERPL-SCNC: SIGNIFICANT CHANGE UP MMOL/L (ref 3.5–5)
PROT SERPL-MCNC: 7.4 G/DL — SIGNIFICANT CHANGE UP (ref 6–8)
RBC # BLD: 5.4 M/UL — SIGNIFICANT CHANGE UP (ref 4.7–6.1)
RBC # FLD: 13.3 % — SIGNIFICANT CHANGE UP (ref 11.5–14.5)
SODIUM SERPL-SCNC: 136 MMOL/L — SIGNIFICANT CHANGE UP (ref 135–146)
TROPONIN T, HIGH SENSITIVITY RESULT: 29 NG/L — HIGH (ref 6–21)
WBC # BLD: 4.8 K/UL — SIGNIFICANT CHANGE UP (ref 4.8–10.8)
WBC # FLD AUTO: 4.8 K/UL — SIGNIFICANT CHANGE UP (ref 4.8–10.8)

## 2024-10-29 PROCEDURE — 83735 ASSAY OF MAGNESIUM: CPT

## 2024-10-29 PROCEDURE — 93308 TTE F-UP OR LMTD: CPT | Mod: 26

## 2024-10-29 PROCEDURE — 93005 ELECTROCARDIOGRAM TRACING: CPT

## 2024-10-29 PROCEDURE — 71045 X-RAY EXAM CHEST 1 VIEW: CPT | Mod: 26

## 2024-10-29 PROCEDURE — 99222 1ST HOSP IP/OBS MODERATE 55: CPT

## 2024-10-29 PROCEDURE — C1887: CPT

## 2024-10-29 PROCEDURE — 80061 LIPID PANEL: CPT

## 2024-10-29 PROCEDURE — C1769: CPT

## 2024-10-29 PROCEDURE — 84439 ASSAY OF FREE THYROXINE: CPT

## 2024-10-29 PROCEDURE — 85025 COMPLETE CBC W/AUTO DIFF WBC: CPT

## 2024-10-29 PROCEDURE — C1894: CPT

## 2024-10-29 PROCEDURE — 93458 L HRT ARTERY/VENTRICLE ANGIO: CPT | Mod: 26

## 2024-10-29 PROCEDURE — 80048 BASIC METABOLIC PNL TOTAL CA: CPT

## 2024-10-29 PROCEDURE — 83036 HEMOGLOBIN GLYCOSYLATED A1C: CPT

## 2024-10-29 PROCEDURE — 82962 GLUCOSE BLOOD TEST: CPT

## 2024-10-29 PROCEDURE — 36415 COLL VENOUS BLD VENIPUNCTURE: CPT

## 2024-10-29 PROCEDURE — 93308 TTE F-UP OR LMTD: CPT

## 2024-10-29 PROCEDURE — 93306 TTE W/DOPPLER COMPLETE: CPT

## 2024-10-29 PROCEDURE — 99291 CRITICAL CARE FIRST HOUR: CPT

## 2024-10-29 PROCEDURE — 84443 ASSAY THYROID STIM HORMONE: CPT

## 2024-10-29 PROCEDURE — 93458 L HRT ARTERY/VENTRICLE ANGIO: CPT

## 2024-10-29 RX ORDER — ASPIRIN/MAG CARB/ALUMINUM AMIN 325 MG
81 TABLET ORAL DAILY
Refills: 0 | Status: DISCONTINUED | OUTPATIENT
Start: 2024-10-30 | End: 2024-10-30

## 2024-10-29 RX ORDER — LOSARTAN POTASSIUM 25 MG/1
50 TABLET ORAL DAILY
Refills: 0 | Status: DISCONTINUED | OUTPATIENT
Start: 2024-10-30 | End: 2024-10-30

## 2024-10-29 RX ORDER — MELATONIN 5 MG
5 TABLET ORAL AT BEDTIME
Refills: 0 | Status: DISCONTINUED | OUTPATIENT
Start: 2024-10-29 | End: 2024-10-30

## 2024-10-29 RX ORDER — SODIUM CHLORIDE 9 MG/ML
600 INJECTION, SOLUTION INTRAMUSCULAR; INTRAVENOUS; SUBCUTANEOUS
Refills: 0 | Status: DISCONTINUED | OUTPATIENT
Start: 2024-10-29 | End: 2024-10-30

## 2024-10-29 RX ORDER — GABAPENTIN 300 MG/1
300 CAPSULE ORAL DAILY
Refills: 0 | Status: DISCONTINUED | OUTPATIENT
Start: 2024-10-29 | End: 2024-10-30

## 2024-10-29 RX ORDER — HEPARIN SODIUM 10000 [USP'U]/ML
5000 INJECTION INTRAVENOUS; SUBCUTANEOUS EVERY 12 HOURS
Refills: 0 | Status: DISCONTINUED | OUTPATIENT
Start: 2024-10-29 | End: 2024-10-30

## 2024-10-29 RX ADMIN — Medication 80 MILLIGRAM(S): at 21:11

## 2024-10-29 RX ADMIN — Medication 5 MILLIGRAM(S): at 22:02

## 2024-10-29 RX ADMIN — HEPARIN SODIUM 5000 UNIT(S): 10000 INJECTION INTRAVENOUS; SUBCUTANEOUS at 18:17

## 2024-10-29 RX ADMIN — SODIUM CHLORIDE 100 MILLILITER(S): 9 INJECTION, SOLUTION INTRAMUSCULAR; INTRAVENOUS; SUBCUTANEOUS at 13:42

## 2024-10-29 NOTE — H&P CARDIOLOGY - NS ATTEND AMEND GEN_ALL_CORE FT
Patient presented for syncope in the setting of a neck injection. Initial ECG with ischemic findings. Urgent LHC demonstrated 80% stenosis in OM1 and moderate atherosclerosis, with plan for medical management. Will further investigate reason for syncope.

## 2024-10-29 NOTE — DISCHARGE NOTE PROVIDER - NSDCMRMEDTOKEN_GEN_ALL_CORE_FT
gabapentin 300 mg oral capsule: 1 cap(s) orally once a day as needed for tingling  losartan 50 mg oral tablet: 1 tab(s) orally once a day  losartan-hydrochlorothiazide 50 mg-12.5 mg oral tablet: 1 tab(s) orally once a day  metFORMIN 750 mg oral tablet, extended release: 1 tab(s) orally once a day   aspirin 81 mg oral delayed release tablet: 1 tab(s) orally once a day  atenolol 50 mg oral tablet: 1 tab(s) orally once a day  atorvastatin 40 mg oral tablet: 1 tab(s) orally once a day (at bedtime)  gabapentin 300 mg oral capsule: 1 cap(s) orally once a day As needed tingling  losartan-hydrochlorothiazide 50 mg-12.5 mg oral tablet: 1 tab(s) orally once a day  metFORMIN 750 mg oral tablet, extended release: 1 tab(s) orally once a day

## 2024-10-29 NOTE — PATIENT PROFILE ADULT - HAS THE PATIENT RECEIVED THE INFLUENZA VACCINE THIS SEASON?
Spoke with patient's nurse, Jacob Chu and patient would like to do his CT scan tomorrow morning due to him being tired and inability to drink the oral contrast tonight. Tech provided oral contrast & jug to RN and instructed her to just mix the contrast with water and have patient drink it when he's able to. Instructed to call CT @[id number] after patient has finished drinking the contrast in order to set up a time for patient to come over for his scan. no...

## 2024-10-29 NOTE — ED PROVIDER NOTE - ATTENDING CONTRIBUTION TO CARE
I have reviewed triage notes and vital signs available at this time.  I have reviewed lab results, if any, available at this time.  I have reviewed EKGs, if any, available at this time.  I have reviewed radiology results and radiographs/scans, if any, available at this time.      I have personally seen, evaluated and participated in this patient's care and find this patient's history and physical examination are consistent with the chart documentation, unless noted below.  ---  Patient presented to ED status post syncope. Evaluated immediately upon arrival.  ED EKG, independent interpretation by me Dr. Lupis Meneses: normal sinus rhythm, ST elevation consistent with acute occlusion MI.  Patient has no ACS complaints.  No chest pain, shortness of breath, back pain, any other complaints.  Cardiology consulted immediately via teams communication.  Compared to previous EKG, ST elevations are new.  Bedside focused transthoracic cardiac ultrasound examination was performed by me, Dr. Lupis Meneses -no pericardial effusion, no regional wall abnormality, normal EF    Cardiology evaluated patient at bedside.  Even though patient has no ACS symptoms, concern for new EKG findings.  Cardiology recommended coronary catheterization. Patient transferred to cath lab with cardiology escort.    Inpatient team to follow-up results of labs and further evaluation and workup.

## 2024-10-29 NOTE — DISCHARGE NOTE PROVIDER - PROVIDER TOKENS
PROVIDER:[TOKEN:[[id number]],FOLLOWUP:[2 weeks]] PROVIDER:[TOKEN:[[id number]],FOLLOWUP:[1 month]]

## 2024-10-29 NOTE — ED PROVIDER NOTE - OBJECTIVE STATEMENT
54-year-old male past medical history of DM, HTN, HLD, coming with complaints of syncope.  Patient was at physical therapy for his back pain when they gave him an unknown neck injection for the first time, 20 minutes later he suddenly passed out, does not believe he hit his head but does not recall.  Currently feels back at baseline.  Denies any preceding symptoms including CP, SOB, diaphoresis, or nausea.  No recent illnesses.

## 2024-10-29 NOTE — DISCHARGE NOTE PROVIDER - ATTENDING DISCHARGE PHYSICAL EXAMINATION:
Right radial access site is clean, dry, and intact with no hematoma or tenderness.    Patient presented with syncope, approximately 20 minutes after an injection of the left trapezius muscle, likely vasovagal syncope. [facility] with 80% lesion of OM1, plan for medical management. Since patient has no symptoms, his antihypertensive regimen was not changed. Atorvastatin was increased. On TTE, LVEF normal with possibility of bicuspid aortic valve, will reevaluate as an outpatient.

## 2024-10-29 NOTE — CHART NOTE - NSCHARTNOTEFT_GEN_A_CORE
PRE-OP DIAGNOSIS:    NSTEMI    PROCEDURE:     [x] Coronary Angiogram     [x] LHC     [] LVG     [] RHC     [] Intervention (see below)         PHYSICIAN:  Dr Esparza    ASSISTANT: Dr CAYDEN Gibson     PROCEDURE DESCRIPTION:     Consent:      [x] Patient     [] Family Member     []  Used        Anesthesia:     [] General     [x] Sedation     [x] Local        Access & Closure:     [x] 6 Fr Right Radial Artery (D stat closure)     [] Fr Femoral Artery     [] Fr Femoral Vein     [] Fr Brachial Vein       IV Contrast: 35 mL        Intervention: None      Implants: None       FINDINGS:     Coronary Dominance: Right      LM: No significant atherosclerosis    LAD: Moderate atherosclerosis  D1: Moderate atherosclerosis    CX: Moderate atherosclerosis  OM1: 80% eccentric stenosis    RCA: Moderate atherosclerosis  RPDA: Minor luminal irregularities            ESTIMATED BLOOD LOSS: < 10 mL        CONDITION:     [x] Good     [] Fair     [] Critical        SPECIMEN REMOVED: N/A       POST-OP DIAGNOSIS:      [x] 1 Vessel Coronary Artery Disease. OM1 80% eccentric stenosis recommend medical therapy.        PLAN OF CARE:     [x] Return to In-patient bed     [x] Medications: Aspirin 81 mg qd and high intensity statins.     [x] IV Fluids: IV NS @ 100 cc/hr for 6 hours

## 2024-10-29 NOTE — DISCHARGE NOTE PROVIDER - HOSPITAL COURSE
Mr. Souza is a 54 year old male with PMHx of DM, gallstones, Penile disorder, and HTN who presents after a syncopal episode. Patient was at physical therapy for his back pain when they gave him an unknown neck injection for the first time, 20 minutes later he suddenly passed out, does not believe he hit his head but does not recall.  Patient presented to ED and while in ED showed EKG changes of ST elevation consistent with acute MI.  Cardiology evaluated patient and Troponin elevated at 29 and with patients presenting symptoms, he presents to the cardiology department for emergent LHC with possible intervention. Case was then discussed with interventional cardiologist on call and STEMI cancelled.    Patient was taken to the cath lab and underwent a LHC on 10/29 which revealed the following:     LM: No significant atherosclerosis  LAD: Moderate atherosclerosis  D1: Moderate atherosclerosis  CX: Moderate atherosclerosis  OM1: 80% eccentric stenosis  RCA: Moderate atherosclerosis  RPDA: Minor luminal irregularities    Patient to be treated medically with ASA 81 mg daily and high intensity statin    POD # 1 no events overnight. Right radial dressing C/D/I with no s/s of hematoma present. Patient cleared to be discharged home.      Mr. Souza is a 54 year old male with PMHx of DM, gallstones, Penile disorder, and HTN who presents after a syncopal episode. Patient was at physical therapy for his back pain when they gave him an unknown neck injection for the first time, 20 minutes later he suddenly passed out, does not believe he hit his head but does not recall.  Patient presented to ED and while in ED showed EKG changes of ST elevation consistent with acute MI.  Cardiology evaluated patient and Troponin elevated at 29 and with patients presenting symptoms, he presents to the cardiology department for emergent LHC with possible intervention. Case was then discussed with interventional cardiologist on call and STEMI cancelled.    Patient was taken to the cath lab and underwent a LHC on 10/29 which revealed the following:     LM: No significant atherosclerosis  LAD: Moderate atherosclerosis  D1: Moderate atherosclerosis  CX: Moderate atherosclerosis  OM1: 80% eccentric stenosis  RCA: Moderate atherosclerosis  RPDA: Minor luminal irregularities    TTE 10/30/24:   Summary:   1. Left ventricular ejection fraction, by visual estimation, is 55 to   60%.   2. Normal global left ventricular systolic function.   3. Normal left atrial size.   4. Normal right atrial size.   5. Trace mitral valve regurgitation.   6. Aortic valve was not well visualized. It is mildly thickened,   possibly bicuspid.   7. No evidence of aortic stenosis.   8. There is no evidence of pericardial effusion.      Patient to be treated medically with ASA 81 mg daily and Atorvastatin 40mg.     POD # 1 no events overnight. Right radial dressing C/D/I with no s/s of hematoma present. Patient cleared to be discharged home.      Mr. Souza is a 54 year old male with PMHx of DM, gallstones, Penile disorder, and HTN who presents after a syncopal episode. Patient was at physical therapy for his back pain when they gave him an unknown neck injection for the first time, 20 minutes later he suddenly passed out, does not believe he hit his head but does not recall.  Patient presented to ED and while in ED showed EKG changes of ST elevation consistent with acute MI.  Cardiology evaluated patient and Troponin elevated at 29 and with patients presenting symptoms, he presents to the cardiology department for emergent LHC with possible intervention. Case was then discussed with interventional cardiologist on call and STEMI cancelled.    Patient was taken to the cath lab and underwent a LHC on 10/29 which revealed the following:     LM: No significant atherosclerosis  LAD: Moderate atherosclerosis  D1: Moderate atherosclerosis  CX: Moderate atherosclerosis  OM1: 80% eccentric stenosis  RCA: Moderate atherosclerosis  RPDA: Minor luminal irregularities    TTE 10/30/24:   Summary:   1. Left ventricular ejection fraction, by visual estimation, is 55 to   60%.   2. Normal global left ventricular systolic function.   3. Normal left atrial size.   4. Normal right atrial size.   5. Trace mitral valve regurgitation.   6. Aortic valve was not well visualized. It is mildly thickened,   possibly bicuspid.   7. No evidence of aortic stenosis.   8. There is no evidence of pericardial effusion.      Patient to be treated medically with ASA 81 mg daily and Atorvastatin 40mg. Pt was counseled on the importance of medication compliance multiple times.     POD # 1 no events overnight. Right radial dressing C/D/I with no s/s of hematoma present. Patient cleared to be discharged home.      Mr. Souza is a 54 year old male with PMHx of DM, gallstones, Penile disorder, and HTN who presents after a syncopal episode. Patient was at physical therapy for his back pain when they gave him an unknown neck injection for the first time, 20 minutes later he suddenly passed out, does not believe he hit his head but does not recall.  Patient presented to ED and while in ED showed EKG changes of ST elevation consistent with acute MI.  Cardiology evaluated patient and Troponin elevated at 29 and with patients presenting symptoms, he presents to the cardiology department for emergent LHC with possible intervention. Case was then discussed with interventional cardiologist on call and STEMI cancelled.    Patient was taken to the cath lab and underwent a LHC on 10/29 which revealed the following:     LM: No significant atherosclerosis  LAD: Moderate atherosclerosis  D1: Moderate atherosclerosis  CX: Moderate atherosclerosis  OM1: 80% eccentric stenosis  RCA: Moderate atherosclerosis  RPDA: Minor luminal irregularities    TTE 10/30/24:   Summary:   1. Left ventricular ejection fraction, by visual estimation, is 55 to 60%.   2. Normal global left ventricular systolic function.   3. Normal left atrial size.   4. Normal right atrial size.   5. Trace mitral valve regurgitation.   6. Aortic valve was not well visualized. It is mildly thickened, possibly bicuspid.   7. No evidence of aortic stenosis.   8. There is no evidence of pericardial effusion.      Patient to be treated medically with ASA 81 mg daily and Atorvastatin 40mg. Pt was counseled on the importance of medication compliance multiple times.     POD # 1 no events overnight. Right radial dressing C/D/I with no s/s of hematoma present. Patient cleared to be discharged home.

## 2024-10-29 NOTE — DISCHARGE NOTE PROVIDER - NSDCCPCAREPLAN_GEN_ALL_CORE_FT
PRINCIPAL DISCHARGE DIAGNOSIS  Diagnosis: Syncope  Assessment and Plan of Treatment:       SECONDARY DISCHARGE DIAGNOSES  Diagnosis: ST elevation on ECG  Assessment and Plan of Treatment:      PRINCIPAL DISCHARGE DIAGNOSIS  Diagnosis: Syncope  Assessment and Plan of Treatment: Please take your home meds, aspirin 81mg daily, and atorvastatin 40mg daily.      SECONDARY DISCHARGE DIAGNOSES  Diagnosis: ST elevation on ECG  Assessment and Plan of Treatment:

## 2024-10-29 NOTE — ED PROVIDER NOTE - CLINICAL SUMMARY MEDICAL DECISION MAKING FREE TEXT BOX
Patient presented to ED status post syncope. Evaluated immediately upon arrival.  ED EKG, independent interpretation by me Dr. Lupis Meneses: normal sinus rhythm, ST elevation consistent with acute occlusion MI.  Patient has no ACS complaints.  No chest pain, shortness of breath, back pain, any other complaints.  Cardiology consulted immediately via teams communication.  Compared to previous EKG, ST elevations are new.  Bedside focused transthoracic cardiac ultrasound examination was performed by me, Dr. Lupis Meneses -no pericardial effusion, no regional wall abnormality, normal EF    Cardiology evaluated patient at bedside.  Even though patient has no ACS symptoms, concern for new EKG findings.  Cardiology recommended coronary catheterization. Patient transferred to cath lab with cardiology escort.    Inpatient team to follow-up results of labs and further evaluation and workup.

## 2024-10-29 NOTE — DISCHARGE NOTE PROVIDER - NSDCCPTREATMENT_GEN_ALL_CORE_FT
PRINCIPAL PROCEDURE  Procedure: Left heart cardiac cath  Findings and Treatment: Medications:  - Please continue to take ASA 81 mg daily   - Soreness or tenderness at the site is possible, it will diminish over time. You may take Tylenol every 4-6 hours as needed. Nothing stronger should be required.  Activity:  - Do not drive or operate heavy machinery for 24 hours.  - For wrist access, avoid using affected arm for 24 hours after removal of dressing and avoid heavy lifting for 7 days.  Hygiene:  - After 24 hours, you may shower and remove the dressing from the site.   - Do not tub bathe for one week.   - Do not rub or apply lotion, cream, powder to the affected site. Leave it open to air.   Diet:   - You may resume your regular diet.   - Drink extra fluid unless othrwise advised.   Special Instructions:  - Bruising or black and blue at the puncture site is possible.  - If there is bleeding from the puncture site (groin or wrist) apply direct, firm pressure on the site and call 911.  - Any sudden swelling, redness, fever, discharge or severe pain, call your physician or call the cath lab.  - If you notice any scab formation in the area avoid touching the site and allow it to heal.  - If Numbness or "pins and needle" sensation occurs in the affected arm, hand, leg; or if the affected site becomes cool to touch or pale that persists for an extended period of time, call your physician immediately to be checked.   - If you developed chest pain, not relieved by your usual routine medication, fainting, lethargy, weakness, report to the nearest emergency room.   - Inform your Dentist or Surgeon if you are taking Aspirin or any antiplatelet medications. Report any bleeding in your urine or stool.   Follow-up:  Please follow up with your Cardiologist in 1-2 weeks after discharge. Please call and make an appointment.        PRINCIPAL PROCEDURE  Procedure: Left heart cardiac cath  Findings and Treatment:   Medications:  - Please continue to take ASA 81 mg daily   - Soreness or tenderness at the site is possible, it will diminish over time. You may take Tylenol every 4-6 hours as needed. Nothing stronger should be required.  Activity:  - Do not drive or operate heavy machinery for 24 hours.  - For wrist access, avoid using affected arm for 24 hours after removal of dressing and avoid heavy lifting for 7 days.  Hygiene:  - After 24 hours, you may shower and remove the dressing from the site.   - Do not tub bathe for one week.   - Do not rub or apply lotion, cream, powder to the affected site. Leave it open to air.   Diet:   - You may resume your regular diet.   - Drink extra fluid unless othrwise advised.   Special Instructions:  - Bruising or black and blue at the puncture site is possible.  - If there is bleeding from the puncture site (groin or wrist) apply direct, firm pressure on the site and call 911.  - Any sudden swelling, redness, fever, discharge or severe pain, call your physician or call the cath lab.  - If you notice any scab formation in the area avoid touching the site and allow it to heal.  - If Numbness or "pins and needle" sensation occurs in the affected arm, hand, leg; or if the affected site becomes cool to touch or pale that persists for an extended period of time, call your physician immediately to be checked.   - If you developed chest pain, not relieved by your usual routine medication, fainting, lethargy, weakness, report to the nearest emergency room.   - Inform your Dentist or Surgeon if you are taking Aspirin or any antiplatelet medications. Report any bleeding in your urine or stool.   Follow-up:  Please follow up with your Cardiologist in 1-2 weeks after discharge. Please call and make an appointment.

## 2024-10-29 NOTE — DISCHARGE NOTE PROVIDER - CARE PROVIDERS DIRECT ADDRESSES
[email or web address]

## 2024-10-29 NOTE — DISCHARGE NOTE PROVIDER - CARE PROVIDER_API CALL
Eris Esparza  Interventional Cardiology  [address] [place] [address], [address]  [place] [address]  Phone: [phone]  Fax: [phone]  Follow Up Time: 2 weeks   Yari Colon  Cardiovascular Disease  [address] [place] [id number]  Phone: [phone]  Fax: [phone]  Follow Up Time: 1 month

## 2024-10-29 NOTE — ED PROVIDER NOTE - PHYSICAL EXAMINATION
CONSTITUTIONAL: NAD  SKIN: Warm dry Some surgical scarring noted on chest (ghinoplasty)  HEAD: NCAT  EYES: NL inspection  ENT: MMM  CARD: RRR  RESP: CTAB  ABD: Soft, non tender, no rebound or guarding   EXT: no pedal edema, Multiple toes amputated but appear normal to patient  NEURO: Grossly unremarkable  PSYCH: Cooperative, appropriate. CONSTITUTIONAL: NAD  SKIN: Warm dry Some surgical scarring noted on chest (ghinoplasty)  HEAD: NCAT  EYES: NL inspection  ENT: dry lips  CARD: RRR  RESP: CTAB  ABD: Soft, non tender, no rebound or guarding   EXT: no pedal edema, Multiple toes amputated but appear normal to patient  NEURO: Grossly unremarkable  PSYCH: Cooperative, appropriate.

## 2024-10-29 NOTE — PATIENT PROFILE ADULT - FALL HARM RISK - HARM RISK INTERVENTIONS
Assistance with ambulation/Assistance OOB with selected safe patient handling equipment/Communicate Risk of Fall with Harm to all staff/Discuss with provider need for PT consult/Monitor gait and stability/Provide patient with walking aids - walker, cane, crutches/Reinforce activity limits and safety measures with patient and family/Sit up slowly, dangle for a short time, stand at bedside before walking/Tailored Fall Risk Interventions/Use of alarms - bed, chair and/or voice tab/Visual Cue: Yellow wristband and red socks/Bed in lowest position, wheels locked, appropriate side rails in place/Call bell, personal items and telephone in reach/Instruct patient to call for assistance before getting out of bed or chair/Non-slip footwear when patient is out of bed/[place] to call system/Physically safe environment - no spills, clutter or unnecessary equipment/Purposeful Proactive Rounding/Room/bathroom lighting operational, light cord in reach

## 2024-10-29 NOTE — H&P CARDIOLOGY - HISTORY OF PRESENT ILLNESS
Patient is a 54y Male with PMH of DM, gallstones, Penile disorder, HTN                                                                       PSH of foot surgery, eye surgery                                                                       Family history:  mother-DM    Patient was at physical therapy for his back pain when they gave him an unknown neck injection for the first time, 20 minutes later he suddenly passed out, does not believe he hit his head but does not recall.  Patient presented to ED and while in ED showed EKG changes of ST elevation consistent with acute MI.  Cardiology evaluated patient and Troponin elevated at 29 and with patients presenting symptoms, he presents to the cardiology department for emergent LHC with possible intervention.         Pre cath note:  indication:  [ ] STEMI                [ ] NSTEMI                 [x ] Acute coronary syndrome                   [ x]Unstable Angina   [ ] high risk  [ ] intermediate risk  [ ] low risk                   [ ] Stable Angina     non-invasive testing:                          Date:                     result: [ ] high risk  [ ] intermediate risk  [ ] low risk    Anti- Anginal medications:                    [ ] not used                       [ ] used                   [ ] not used but strong indication not to use    Ejection Fraction                   [ ] <29            [ ] 30-39%   [ ] 40-49%     [ ]>50%    CHF                   [ ] active (within last 14 days on meds   [ ] Chronic (on meds but no exacerbation)    COPD                   [ ] mild (on chronic bronchodilators)  [ ] moderate (on chronic steroid therapy)      [ ] severe (indication for home O2 or PACO2 >50)    Other risk factors:                     [ ] Previous MI                     [ ] CVA/ stroke                    [ ] carotid stent/ CEA                    [ ] PVD/PAD- (arterial aneurysm, non-palpable pulses, tortuous vessel with inability to insert catheter, infra-renal dissection, renal or subclavian artery stenosis)                    [x ] diabetic                    [ ] previous CABG                    [ ] Renal Failure     Bleeding Risk: 1%                          13.1   4.80  )-----------( 219      ( 29 Oct 2024 11:10 )             43.2     10-29    136  |  101  |  30[H]  ----------------------------<  165[H]  HEMOLYZED   |  27  |  2.0[H]    Ca    9.1      29 Oct 2024 11:10    TPro  7.4  /  Alb  4.3  /  TBili  0.4  /  DBili  x   /  AST  55[H]  /  ALT  27  /  AlkPhos  95  10-29      RIGHT RADIAL ARTERY EVALUATION:  NEMO TEST: [] Negative          [x] Positive  BARBEAU TEST: [x] Class A           [] Class B           [] Class C            [] Class D      	    EF: n/a    EKG: < from: 12 Lead ECG (10.29.24 @ 10:49) >    Diagnosis Line Normal sinus rhythm  ST elevation consider lateral injury or acute infarct  Abnormal ECG          Fluids:             (x )  ml bolus x 1 hr prior to cardiac cath followed by                           ( ) 75cc/hr until procedure                           ( ) 50cc/hr until procedure                          ( ) no fluids d/t

## 2024-10-29 NOTE — CHART NOTE - NSCHARTNOTEFT_GEN_A_CORE
Code STEMI was activated for this patient to which I responded immediately. Pt was evaluated in the ER and EKG was reviewed. Pt with PMHx of DM and HTN, presented to the hospital with chest pain post syncope after receiving trigger point injection. EKG showed  ST cranages in leads I & AvL.   Patient has no active chest pain.   Case discussed with interventional cardiologist on call and STEMI cancelled.

## 2024-10-29 NOTE — PATIENT PROFILE ADULT - FUNCTIONAL ASSESSMENT - BASIC MOBILITY 6.
4-calculated by average/Not able to assess (calculate score using [facility] averaging method)

## 2024-10-30 ENCOUNTER — RESULT REVIEW (OUTPATIENT)
Age: 54
End: 2024-10-30

## 2024-10-30 ENCOUNTER — TRANSCRIPTION ENCOUNTER (OUTPATIENT)
Age: 54
End: 2024-10-30

## 2024-10-30 VITALS
HEART RATE: 68 BPM | TEMPERATURE: 98 F | DIASTOLIC BLOOD PRESSURE: 74 MMHG | OXYGEN SATURATION: 100 % | SYSTOLIC BLOOD PRESSURE: 134 MMHG | RESPIRATION RATE: 19 BRPM

## 2024-10-30 LAB
A1C WITH ESTIMATED AVERAGE GLUCOSE RESULT: 5.6 % — SIGNIFICANT CHANGE UP (ref 4–5.6)
ANION GAP SERPL CALC-SCNC: 11 MMOL/L — SIGNIFICANT CHANGE UP (ref 7–14)
BASOPHILS # BLD AUTO: 0.02 K/UL — SIGNIFICANT CHANGE UP (ref 0–0.2)
BASOPHILS NFR BLD AUTO: 0.4 % — SIGNIFICANT CHANGE UP (ref 0–1)
BUN SERPL-MCNC: 30 MG/DL — HIGH (ref 10–20)
CALCIUM SERPL-MCNC: 8.6 MG/DL — SIGNIFICANT CHANGE UP (ref 8.4–10.5)
CHLORIDE SERPL-SCNC: 106 MMOL/L — SIGNIFICANT CHANGE UP (ref 98–110)
CHOLEST SERPL-MCNC: 134 MG/DL — SIGNIFICANT CHANGE UP
CO2 SERPL-SCNC: 25 MMOL/L — SIGNIFICANT CHANGE UP (ref 17–32)
CREAT SERPL-MCNC: 1.9 MG/DL — HIGH (ref 0.7–1.5)
EGFR: 41 ML/MIN/1.73M2 — LOW
EOSINOPHIL # BLD AUTO: 0.12 K/UL — SIGNIFICANT CHANGE UP (ref 0–0.7)
EOSINOPHIL NFR BLD AUTO: 2.5 % — SIGNIFICANT CHANGE UP (ref 0–8)
ESTIMATED AVERAGE GLUCOSE: 114 MG/DL — SIGNIFICANT CHANGE UP (ref 68–114)
GLUCOSE BLDC GLUCOMTR-MCNC: 118 MG/DL — HIGH (ref 70–99)
GLUCOSE BLDC GLUCOMTR-MCNC: 215 MG/DL — HIGH (ref 70–99)
GLUCOSE SERPL-MCNC: 113 MG/DL — HIGH (ref 70–99)
HCT VFR BLD CALC: 38.9 % — LOW (ref 42–52)
HDLC SERPL-MCNC: 31 MG/DL — LOW
HGB BLD-MCNC: 11.7 G/DL — LOW (ref 14–18)
IMM GRANULOCYTES NFR BLD AUTO: 0.2 % — SIGNIFICANT CHANGE UP (ref 0.1–0.3)
LIPID PNL WITH DIRECT LDL SERPL: 84 MG/DL — SIGNIFICANT CHANGE UP
LYMPHOCYTES # BLD AUTO: 1.44 K/UL — SIGNIFICANT CHANGE UP (ref 1.2–3.4)
LYMPHOCYTES # BLD AUTO: 29.9 % — SIGNIFICANT CHANGE UP (ref 20.5–51.1)
MAGNESIUM SERPL-MCNC: 2.3 MG/DL — SIGNIFICANT CHANGE UP (ref 1.8–2.4)
MCHC RBC-ENTMCNC: 23.9 PG — LOW (ref 27–31)
MCHC RBC-ENTMCNC: 30.1 G/DL — LOW (ref 32–37)
MCV RBC AUTO: 79.6 FL — LOW (ref 80–94)
MONOCYTES # BLD AUTO: 0.53 K/UL — SIGNIFICANT CHANGE UP (ref 0.1–0.6)
MONOCYTES NFR BLD AUTO: 11 % — HIGH (ref 1.7–9.3)
NEUTROPHILS # BLD AUTO: 2.69 K/UL — SIGNIFICANT CHANGE UP (ref 1.4–6.5)
NEUTROPHILS NFR BLD AUTO: 56 % — SIGNIFICANT CHANGE UP (ref 42.2–75.2)
NON HDL CHOLESTEROL: 103 MG/DL — SIGNIFICANT CHANGE UP
NRBC # BLD: 0 /100 WBCS — SIGNIFICANT CHANGE UP (ref 0–0)
PLATELET # BLD AUTO: 205 K/UL — SIGNIFICANT CHANGE UP (ref 130–400)
PMV BLD: 10.7 FL — HIGH (ref 7.4–10.4)
POTASSIUM SERPL-MCNC: 4.3 MMOL/L — SIGNIFICANT CHANGE UP (ref 3.5–5)
POTASSIUM SERPL-SCNC: 4.3 MMOL/L — SIGNIFICANT CHANGE UP (ref 3.5–5)
RBC # BLD: 4.89 M/UL — SIGNIFICANT CHANGE UP (ref 4.7–6.1)
RBC # FLD: 13.2 % — SIGNIFICANT CHANGE UP (ref 11.5–14.5)
SODIUM SERPL-SCNC: 142 MMOL/L — SIGNIFICANT CHANGE UP (ref 135–146)
T4 FREE SERPL-MCNC: 1 NG/DL — SIGNIFICANT CHANGE UP (ref 0.9–1.8)
TRIGL SERPL-MCNC: 95 MG/DL — SIGNIFICANT CHANGE UP
TSH SERPL-MCNC: 1.08 UIU/ML — SIGNIFICANT CHANGE UP (ref 0.27–4.2)
WBC # BLD: 4.81 K/UL — SIGNIFICANT CHANGE UP (ref 4.8–10.8)
WBC # FLD AUTO: 4.81 K/UL — SIGNIFICANT CHANGE UP (ref 4.8–10.8)

## 2024-10-30 PROCEDURE — 93010 ELECTROCARDIOGRAM REPORT: CPT

## 2024-10-30 PROCEDURE — 99239 HOSP IP/OBS DSCHRG MGMT >30: CPT

## 2024-10-30 PROCEDURE — 93306 TTE W/DOPPLER COMPLETE: CPT | Mod: 26

## 2024-10-30 RX ORDER — ASPIRIN/MAG CARB/ALUMINUM AMIN 325 MG
1 TABLET ORAL
Qty: 30 | Refills: 1
Start: 2024-10-30 | End: 2024-12-28

## 2024-10-30 RX ORDER — ASPIRIN/MAG CARB/ALUMINUM AMIN 325 MG
1 TABLET ORAL
Qty: 0 | Refills: 0 | DISCHARGE
Start: 2024-10-30

## 2024-10-30 RX ORDER — ATENOLOL 100 MG
1 TABLET ORAL
Qty: 0 | Refills: 0 | DISCHARGE
Start: 2024-10-30

## 2024-10-30 RX ORDER — GABAPENTIN 300 MG/1
1 CAPSULE ORAL
Qty: 0 | Refills: 0 | DISCHARGE
Start: 2024-10-30

## 2024-10-30 RX ORDER — ATENOLOL 100 MG
50 TABLET ORAL DAILY
Refills: 0 | Status: DISCONTINUED | OUTPATIENT
Start: 2024-10-30 | End: 2024-10-30

## 2024-10-30 RX ADMIN — HEPARIN SODIUM 5000 UNIT(S): 10000 INJECTION INTRAVENOUS; SUBCUTANEOUS at 06:04

## 2024-10-30 RX ADMIN — Medication 81 MILLIGRAM(S): at 11:40

## 2024-10-30 RX ADMIN — LOSARTAN POTASSIUM 50 MILLIGRAM(S): 25 TABLET ORAL at 06:05

## 2024-10-30 NOTE — DISCHARGE NOTE NURSING/CASE MANAGEMENT/SOCIAL WORK - PATIENT PORTAL LINK FT
You can access the FollowMyHealth Patient Portal offered by [facility] by registering at the following website: [email or web address]. By joining [facility]’s FollowMyHealth portal, you will also be able to view your health information using other applications (apps) compatible with our system.

## 2024-10-30 NOTE — DISCHARGE NOTE NURSING/CASE MANAGEMENT/SOCIAL WORK - NSDCPEFALRISK_GEN_ALL_CORE
For information on Fall & Injury Prevention, visit: [email or web address] OR  [email or web address] OR  https://www.cdc.gov/steadi/patient.html

## 2024-10-30 NOTE — DISCHARGE NOTE NURSING/CASE MANAGEMENT/SOCIAL WORK - FINANCIAL ASSISTANCE
[facility] provides services at a reduced cost to those who are determined to be eligible through [facility]’s financial assistance program. Information regarding [facility]’s financial assistance program can be found by going to [email or web address] or by calling [phone].

## 2024-11-04 ENCOUNTER — NON-APPOINTMENT (OUTPATIENT)
Age: 54
End: 2024-11-04

## 2024-11-08 DIAGNOSIS — R55 SYNCOPE AND COLLAPSE: ICD-10-CM

## 2024-11-08 DIAGNOSIS — E11.9 TYPE 2 DIABETES MELLITUS WITHOUT COMPLICATIONS: ICD-10-CM

## 2024-11-08 DIAGNOSIS — Z83.3 FAMILY HISTORY OF DIABETES MELLITUS: ICD-10-CM

## 2024-11-08 DIAGNOSIS — I25.10 ATHEROSCLEROTIC HEART DISEASE OF NATIVE CORONARY ARTERY WITHOUT ANGINA PECTORIS: ICD-10-CM

## 2024-11-08 DIAGNOSIS — I10 ESSENTIAL (PRIMARY) HYPERTENSION: ICD-10-CM

## 2024-11-08 DIAGNOSIS — Z79.82 LONG TERM (CURRENT) USE OF ASPIRIN: ICD-10-CM

## 2024-11-08 DIAGNOSIS — Z79.84 LONG TERM (CURRENT) USE OF ORAL HYPOGLYCEMIC DRUGS: ICD-10-CM

## 2024-11-08 DIAGNOSIS — E78.5 HYPERLIPIDEMIA, UNSPECIFIED: ICD-10-CM

## 2024-12-30 ENCOUNTER — EMERGENCY (EMERGENCY)
Facility: HOSPITAL | Age: 54
LOS: 0 days | Discharge: HOME | End: 2024-12-30
Attending: EMERGENCY MEDICINE
Payer: MEDICARE

## 2024-12-30 VITALS
OXYGEN SATURATION: 100 % | SYSTOLIC BLOOD PRESSURE: 167 MMHG | HEIGHT: 77 IN | DIASTOLIC BLOOD PRESSURE: 95 MMHG | RESPIRATION RATE: 18 BRPM | HEART RATE: 77 BPM | WEIGHT: 229.94 LBS | TEMPERATURE: 98 F

## 2024-12-30 DIAGNOSIS — Z79.82 LONG TERM (CURRENT) USE OF ASPIRIN: ICD-10-CM

## 2024-12-30 DIAGNOSIS — E78.5 HYPERLIPIDEMIA, UNSPECIFIED: ICD-10-CM

## 2024-12-30 DIAGNOSIS — E11.621 TYPE 2 DIABETES MELLITUS WITH FOOT ULCER: ICD-10-CM

## 2024-12-30 DIAGNOSIS — E11.51 TYPE 2 DIABETES MELLITUS WITH DIABETIC PERIPHERAL ANGIOPATHY WITHOUT GANGRENE: ICD-10-CM

## 2024-12-30 DIAGNOSIS — L97.529 NON-PRESSURE CHRONIC ULCER OF OTHER PART OF LEFT FOOT WITH UNSPECIFIED SEVERITY: ICD-10-CM

## 2024-12-30 DIAGNOSIS — Z98.890 OTHER SPECIFIED POSTPROCEDURAL STATES: Chronic | ICD-10-CM

## 2024-12-30 DIAGNOSIS — Z89.422 ACQUIRED ABSENCE OF OTHER LEFT TOE(S): ICD-10-CM

## 2024-12-30 DIAGNOSIS — I25.10 ATHEROSCLEROTIC HEART DISEASE OF NATIVE CORONARY ARTERY WITHOUT ANGINA PECTORIS: ICD-10-CM

## 2024-12-30 DIAGNOSIS — I10 ESSENTIAL (PRIMARY) HYPERTENSION: ICD-10-CM

## 2024-12-30 DIAGNOSIS — Z96.0 PRESENCE OF UROGENITAL IMPLANTS: Chronic | ICD-10-CM

## 2024-12-30 PROCEDURE — 99284 EMERGENCY DEPT VISIT MOD MDM: CPT | Mod: 25

## 2024-12-30 PROCEDURE — 73630 X-RAY EXAM OF FOOT: CPT | Mod: 26,LT

## 2024-12-30 PROCEDURE — 99284 EMERGENCY DEPT VISIT MOD MDM: CPT

## 2024-12-30 PROCEDURE — 73630 X-RAY EXAM OF FOOT: CPT | Mod: LT

## 2024-12-30 PROCEDURE — 99283 EMERGENCY DEPT VISIT LOW MDM: CPT

## 2024-12-30 NOTE — CONSULT NOTE ADULT - ATTENDING COMMENTS
Agree with above note  left foot local care no need for admission/antibiotics  will follow up in clinic for further treatment

## 2024-12-30 NOTE — ED PROVIDER NOTE - PATIENT PORTAL LINK FT
You can access the FollowMyHealth Patient Portal offered by [facility] by registering at the following website: [email or web address]. By joining [employer]’s FollowMyHealth portal, you will also be able to view your health information using other applications (apps) compatible with our system.

## 2024-12-30 NOTE — ED PROVIDER NOTE - OBJECTIVE STATEMENT
Patient is a 54-year-old male with past medical history of hypertension, hyperlipidemia, CAD on aspirin, PVD, diabetes presents evaluation of new toe ulceration. He denies any other complaints. Follows with dr rivera.

## 2024-12-30 NOTE — ED PROVIDER NOTE - CLINICAL SUMMARY MEDICAL DECISION MAKING FREE TEXT BOX
patient with left foot wound with clear drainage for 1 day.  Patient seen by podiatry recommends topical antibiotics local wound care.

## 2024-12-30 NOTE — ED PROVIDER NOTE - NSFOLLOWUPINSTRUCTIONS_ED_ALL_ED_FT
You have been diagnosed with foot ulcers related to diabetes. Diabetes is a disease that makes it very hard to control your blood sugar. One dangerous complication of diabetes is a higher risk of developing serious foot problems. Wounds, even minor ones, can easily become infected when you have diabetes. These infections can become life-threatening if they aren't treated. Infections that settle in the bones can also spread throughout your foot and leg, destroying bone as they travel.    Your healthcare provider wants you to practice good diabetic foot care. This can help make sure that hot spots, small cracks, or sores are treated before they get infected. If infection is already present, medicines will be prescribed. Please make sure to follow your antibiotic therapy as indicated.    Seek immediate medical attention if you have any of the following:    -Fever above 101 F (38.3 C)  -Redness, swelling, or pain in the foot that doesn't go away  -Numbness or tingling in any part of your foot  -Chills, light-headedness, or fainting  -Odor from wounds or swollen areas

## 2024-12-30 NOTE — ED PROVIDER NOTE - CARE PROVIDER_API CALL
Shad Fisher  Podiatric Medicine and Surgery  [address], [address]  [place] [address]  Phone: [phone]  Fax: [phone]  Follow Up Time:

## 2024-12-30 NOTE — ED ADULT NURSE NOTE - NSFALLUNIVINTERV_ED_ALL_ED
Bed/Stretcher in lowest position, wheels locked, appropriate side rails in place/Call bell, personal items and telephone in reach/Instruct patient to call for assistance before getting out of bed/chair/stretcher/Non-slip footwear applied when patient is off stretcher/[place] to call system/Physically safe environment - no spills, clutter or unnecessary equipment/Purposeful proactive rounding/Room/bathroom lighting operational, light cord in reach

## 2024-12-30 NOTE — ED PROVIDER NOTE - PHYSICAL EXAMINATION
As Follows:  CONST: Well appearing in NAD  EYES: PERRL, EOMI, Sclera and conjunctiva clear.   CARD: Normal rate and rhythm  RESP: No distress or accessory breathing  MS: Nontender left toe with calluses around digits, s/p toe amputation, no active drainage.  SKIN: Warm, dry, no acute rashes. MMM  NEURO: Alert and Oriented, No focal deficits. Strength and sensation intact. Ambulates with cane.

## 2025-01-02 ENCOUNTER — OUTPATIENT (OUTPATIENT)
Dept: OUTPATIENT SERVICES | Facility: HOSPITAL | Age: 55
LOS: 1 days | End: 2025-01-02
Payer: MEDICARE

## 2025-01-02 ENCOUNTER — APPOINTMENT (OUTPATIENT)
Dept: PODIATRY | Facility: CLINIC | Age: 55
End: 2025-01-02
Payer: MEDICARE

## 2025-01-02 DIAGNOSIS — G89.29 PAIN IN LEFT FOOT: ICD-10-CM

## 2025-01-02 DIAGNOSIS — Z98.890 OTHER SPECIFIED POSTPROCEDURAL STATES: Chronic | ICD-10-CM

## 2025-01-02 DIAGNOSIS — R23.4 CHANGES IN SKIN TEXTURE: ICD-10-CM

## 2025-01-02 DIAGNOSIS — Z00.00 ENCOUNTER FOR GENERAL ADULT MEDICAL EXAMINATION WITHOUT ABNORMAL FINDINGS: ICD-10-CM

## 2025-01-02 DIAGNOSIS — Z96.0 PRESENCE OF UROGENITAL IMPLANTS: Chronic | ICD-10-CM

## 2025-01-02 DIAGNOSIS — E11.59 TYPE 2 DIABETES MELLITUS WITH OTHER CIRCULATORY COMPLICATIONS: ICD-10-CM

## 2025-01-02 DIAGNOSIS — M79.672 PAIN IN LEFT FOOT: ICD-10-CM

## 2025-01-02 DIAGNOSIS — M21.612 BUNION OF LEFT FOOT: ICD-10-CM

## 2025-01-02 PROCEDURE — 99213 OFFICE O/P EST LOW 20 MIN: CPT

## 2025-01-06 DIAGNOSIS — E11.59 TYPE 2 DIABETES MELLITUS WITH OTHER CIRCULATORY COMPLICATIONS: ICD-10-CM

## 2025-01-06 DIAGNOSIS — L97.509 NON-PRESSURE CHRONIC ULCER OF OTHER PART OF UNSPECIFIED FOOT WITH UNSPECIFIED SEVERITY: ICD-10-CM

## 2025-01-06 DIAGNOSIS — M21.612 BUNION OF LEFT FOOT: ICD-10-CM

## 2025-01-06 DIAGNOSIS — M79.672 PAIN IN LEFT FOOT: ICD-10-CM
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
## 2025-01-16 ENCOUNTER — APPOINTMENT (OUTPATIENT)
Dept: PODIATRY | Facility: CLINIC | Age: 55
End: 2025-01-16

## 2025-01-16 ENCOUNTER — EMERGENCY (EMERGENCY)
Facility: HOSPITAL | Age: 55
LOS: 0 days | Discharge: HOME | End: 2025-01-16
Attending: EMERGENCY MEDICINE
Payer: MEDICARE

## 2025-01-16 VITALS
RESPIRATION RATE: 17 BRPM | OXYGEN SATURATION: 100 % | HEART RATE: 70 BPM | HEIGHT: 77 IN | DIASTOLIC BLOOD PRESSURE: 101 MMHG | WEIGHT: 229.94 LBS | SYSTOLIC BLOOD PRESSURE: 184 MMHG | TEMPERATURE: 98 F

## 2025-01-16 DIAGNOSIS — Z98.890 OTHER SPECIFIED POSTPROCEDURAL STATES: Chronic | ICD-10-CM

## 2025-01-16 DIAGNOSIS — Z96.0 PRESENCE OF UROGENITAL IMPLANTS: Chronic | ICD-10-CM

## 2025-01-16 LAB
ALBUMIN SERPL ELPH-MCNC: 4.6 G/DL — SIGNIFICANT CHANGE UP (ref 3.5–5.2)
ALP SERPL-CCNC: 116 U/L — HIGH (ref 30–115)
ALT FLD-CCNC: 29 U/L — SIGNIFICANT CHANGE UP (ref 0–41)
ANION GAP SERPL CALC-SCNC: 11 MMOL/L — SIGNIFICANT CHANGE UP (ref 7–14)
AST SERPL-CCNC: 26 U/L — SIGNIFICANT CHANGE UP (ref 0–41)
BASOPHILS # BLD AUTO: 0.02 K/UL — SIGNIFICANT CHANGE UP (ref 0–0.2)
BASOPHILS NFR BLD AUTO: 0.3 % — SIGNIFICANT CHANGE UP (ref 0–1)
BILIRUB SERPL-MCNC: 0.7 MG/DL — SIGNIFICANT CHANGE UP (ref 0.2–1.2)
BUN SERPL-MCNC: 22 MG/DL — HIGH (ref 10–20)
CALCIUM SERPL-MCNC: 9.9 MG/DL — SIGNIFICANT CHANGE UP (ref 8.4–10.5)
CHLORIDE SERPL-SCNC: 98 MMOL/L — SIGNIFICANT CHANGE UP (ref 98–110)
CO2 SERPL-SCNC: 28 MMOL/L — SIGNIFICANT CHANGE UP (ref 17–32)
CREAT SERPL-MCNC: 1.5 MG/DL — SIGNIFICANT CHANGE UP (ref 0.7–1.5)
EGFR: 55 ML/MIN/1.73M2 — LOW
EOSINOPHIL # BLD AUTO: 0.02 K/UL — SIGNIFICANT CHANGE UP (ref 0–0.7)
EOSINOPHIL NFR BLD AUTO: 0.3 % — SIGNIFICANT CHANGE UP (ref 0–8)
GLUCOSE SERPL-MCNC: 146 MG/DL — HIGH (ref 70–99)
HCT VFR BLD CALC: 43.8 % — SIGNIFICANT CHANGE UP (ref 42–52)
HGB BLD-MCNC: 13 G/DL — LOW (ref 14–18)
IMM GRANULOCYTES NFR BLD AUTO: 0.3 % — SIGNIFICANT CHANGE UP (ref 0.1–0.3)
LIDOCAIN IGE QN: 15 U/L — SIGNIFICANT CHANGE UP (ref 7–60)
LYMPHOCYTES # BLD AUTO: 1.04 K/UL — LOW (ref 1.2–3.4)
LYMPHOCYTES # BLD AUTO: 15.7 % — LOW (ref 20.5–51.1)
MCHC RBC-ENTMCNC: 23.5 PG — LOW (ref 27–31)
MCHC RBC-ENTMCNC: 29.7 G/DL — LOW (ref 32–37)
MCV RBC AUTO: 79.2 FL — LOW (ref 80–94)
MONOCYTES # BLD AUTO: 0.35 K/UL — SIGNIFICANT CHANGE UP (ref 0.1–0.6)
MONOCYTES NFR BLD AUTO: 5.3 % — SIGNIFICANT CHANGE UP (ref 1.7–9.3)
NEUTROPHILS # BLD AUTO: 5.18 K/UL — SIGNIFICANT CHANGE UP (ref 1.4–6.5)
NEUTROPHILS NFR BLD AUTO: 78.1 % — HIGH (ref 42.2–75.2)
NRBC # BLD: 0 /100 WBCS — SIGNIFICANT CHANGE UP (ref 0–0)
PLATELET # BLD AUTO: 246 K/UL — SIGNIFICANT CHANGE UP (ref 130–400)
PMV BLD: 10 FL — SIGNIFICANT CHANGE UP (ref 7.4–10.4)
POTASSIUM SERPL-MCNC: 4.8 MMOL/L — SIGNIFICANT CHANGE UP (ref 3.5–5)
POTASSIUM SERPL-SCNC: 4.8 MMOL/L — SIGNIFICANT CHANGE UP (ref 3.5–5)
PROT SERPL-MCNC: 7.4 G/DL — SIGNIFICANT CHANGE UP (ref 6–8)
RBC # BLD: 5.53 M/UL — SIGNIFICANT CHANGE UP (ref 4.7–6.1)
RBC # FLD: 13.1 % — SIGNIFICANT CHANGE UP (ref 11.5–14.5)
SODIUM SERPL-SCNC: 137 MMOL/L — SIGNIFICANT CHANGE UP (ref 135–146)
WBC # BLD: 6.63 K/UL — SIGNIFICANT CHANGE UP (ref 4.8–10.8)
WBC # FLD AUTO: 6.63 K/UL — SIGNIFICANT CHANGE UP (ref 4.8–10.8)

## 2025-01-16 PROCEDURE — 96375 TX/PRO/DX INJ NEW DRUG ADDON: CPT

## 2025-01-16 PROCEDURE — 99284 EMERGENCY DEPT VISIT MOD MDM: CPT | Mod: 25

## 2025-01-16 PROCEDURE — 76705 ECHO EXAM OF ABDOMEN: CPT

## 2025-01-16 PROCEDURE — 83690 ASSAY OF LIPASE: CPT

## 2025-01-16 PROCEDURE — 76705 ECHO EXAM OF ABDOMEN: CPT | Mod: 26

## 2025-01-16 PROCEDURE — 99285 EMERGENCY DEPT VISIT HI MDM: CPT

## 2025-01-16 PROCEDURE — 36415 COLL VENOUS BLD VENIPUNCTURE: CPT

## 2025-01-16 PROCEDURE — 85025 COMPLETE CBC W/AUTO DIFF WBC: CPT

## 2025-01-16 PROCEDURE — 96374 THER/PROPH/DIAG INJ IV PUSH: CPT

## 2025-01-16 PROCEDURE — 80053 COMPREHEN METABOLIC PANEL: CPT

## 2025-01-16 RX ORDER — ONDANSETRON 4 MG/1
4 TABLET ORAL ONCE
Refills: 0 | Status: COMPLETED | OUTPATIENT
Start: 2025-01-16 | End: 2025-01-16

## 2025-01-16 RX ORDER — FAMOTIDINE 20 MG/1
20 TABLET, FILM COATED ORAL ONCE
Refills: 0 | Status: COMPLETED | OUTPATIENT
Start: 2025-01-16 | End: 2025-01-16

## 2025-01-16 RX ORDER — SODIUM CHLORIDE 9 MG/ML
1000 INJECTION, SOLUTION INTRAVENOUS ONCE
Refills: 0 | Status: COMPLETED | OUTPATIENT
Start: 2025-01-16 | End: 2025-01-16

## 2025-01-16 RX ADMIN — ONDANSETRON 4 MILLIGRAM(S): 4 TABLET ORAL at 10:33

## 2025-01-16 RX ADMIN — FAMOTIDINE 20 MILLIGRAM(S): 20 TABLET, FILM COATED ORAL at 10:33

## 2025-01-16 RX ADMIN — SODIUM CHLORIDE 1000 MILLILITER(S): 9 INJECTION, SOLUTION INTRAVENOUS at 10:33

## 2025-01-16 NOTE — ED PROVIDER NOTE - PATIENT PORTAL LINK FT
You can access the FollowMyHealth Patient Portal offered by [facility] by registering at the following website: [email or web address]. By joining [employer]’s FollowMyHealth portal, you will also be able to view your health information using other applications (apps) compatible with our system.

## 2025-01-16 NOTE — ED PROVIDER NOTE - PHYSICAL EXAMINATION
VITAL SIGNS: I have reviewed nursing notes and confirm.  CONSTITUTIONAL: well-appearing, non-toxic, NAD  SKIN: Warm dry, normal skin turgor, no acute rash, no bruising  HEAD: NCAT  EYES: EOMI, PERRLA, no scleral icterus, normal conjunctiva  ENT: Moist mucous membranes, OR clear. Normal pharynx  NECK: Supple; non tender. Full ROM. No cervical LAD  CARD: RRR, no murmurs, rubs or gallops  RESP: clear to ausculation b/l.  No rales, rhonchi, or wheezing. No increased WOB.  ABD: soft, + BS, epigastric tenderness, non-distended, no rebound or guarding. No CVA tenderness  EXT: Full ROM, no bony tenderness, pulses intact in bilateral UE and LE, no pedal edema, no calf tenderness  NEURO: normal motor. normal sensory. Normal gait.  PSYCH: Cooperative, appropriate. AAOx3.

## 2025-01-16 NOTE — ED ADULT NURSE NOTE - NSFALLUNIVINTERV_ED_ALL_ED
Bed/Stretcher in lowest position, wheels locked, appropriate side rails in place/Call bell, personal items and telephone in reach/Instruct patient to call for assistance before getting out of bed/chair/stretcher/Non-slip footwear applied when patient is off stretcher/[place] to call system/Physically safe environment - no spills, clutter or unnecessary equipment/Purposeful proactive rounding/Room/bathroom lighting operational, light cord in reach

## 2025-01-16 NOTE — ED PROVIDER NOTE - CLINICAL SUMMARY MEDICAL DECISION MAKING FREE TEXT BOX
Statement Selected 54-year-old man, history of hypertension, hyperlipidemia, CAD, PVD, diabetes, gallstones, presents with right upper quadrant pain 2/2 a "gallbladder attack" that began this morning.  Patient had sudden onset of nausea, couple episodes of vomiting.  No fever, chills.  Similar episodes in the past, but has had gallstones for many years and just did not feel comfortable with surgery to remove them.  Vital signs, exam as noted, patient is nontoxic-appearing.  Lungs clear, CV S1-S2, abdomen mild epigastric tenderness, without guarding or rebound.  Labs okay, normal LFTs.  POCUS right upper quadrant sono with gallstones but no cholecystitis.  As patient is feeling better, will discharge with close GEN surge follow-up for surgical planning, explained that symptoms may recur or worsen with fatty foods.  Patient understands and will return for persistent or worsening symptoms.

## 2025-01-16 NOTE — ED ADULT NURSE NOTE - OBJECTIVE STATEMENT
Pt presents to the ED c/o abdominal pain, patient states hes having a gallbladder attack, patient puked 5-6 times today.

## 2025-01-16 NOTE — ED PROVIDER NOTE - NSFOLLOWUPINSTRUCTIONS_ED_ALL_ED_FT
Our Emergency Department Referral Coordinators will be reaching out to you in the next 24-48 hours from 9:00am to 5:00pm to schedule a follow up appointment. Please expect a phone call from the hospital in that time frame. If you do not receive a call or if you have any questions or concerns, you can reach them at   [phone].    Cholelithiasis  Body outline showing the digestive system with a close-up of the gallbladder with gallstones in it.  Cholelithiasis happens when gallstones form in the gallbladder. The gallbladder stores bile. Bile is a fluid that helps digest fats. Bile can harden and form into gallstones. If they cause a blockage, they can cause pain (gallbladder attack).    What are the causes?  This condition may be caused by:  Too much bilirubin in the bile. This happens if you have sickle cell anemia.  Too much of a fat-like substance (cholesterol) in your bile.  Not enough bile salts in your bile. These salts help the body absorb and digest fats.  The gallbladder not emptying fully or often enough. This is common in pregnant women.  What increases the risk?  The following factors may make you more likely to develop this condition:  Being older than age 40.  Eating a lot of fried foods, fat, and refined carbs (refined carbohydrates).  Being female.  Being pregnant many times.  Using medicines with female hormones in them for a long time.  Losing weight fast.  Having gallstones in your family.  Having health problems, such as diabetes, obesity, Crohn's disease, or liver disease.  What are the signs or symptoms?  Often, there may be gallstones but no symptoms. These gallstones are called silent gallstones. If a gallstone causes a blockage, you may get sudden pain. The pain:  Can be in the upper right part of your belly (abdomen).  Normally comes at night or after you eat.  Can last an hour or more.  Can spread to your right shoulder, back, or chest.  Can feel like discomfort, burning, or fullness in the upper part of your belly (indigestion).  If the blockage lasts more than a few hours, you can get an infection or swelling. You may:  Vomit or feel like you may vomit (nauseous).  Feel bloated.  Have belly pain for 5 hours or more.  Feel tender in your belly, often in the upper right part and under your ribs.  Have a fever or chills.  Have skin or the white parts of your eyes turn yellow (jaundice).  Have dark pee (urine) or pale poop (stool).  How is this treated?  Treatment for this condition depends on how bad you feel. If you have symptoms, you may need:  Home care, if symptoms are not very bad.  Do not eat for 12–24 hours. Drink only water and clear liquids.  After 1 or 2 days, start to eat simple or clear foods. Try broth and crackers.  You may need medicines for pain or stomach upset or both.  If you have an infection, you will need antibiotics.  A hospital stay, if you have very bad pain or a very bad infection.  Surgery to remove your gallbladder. You may need this if:  Gallstones keep coming back.  You have very bad symptoms.  Medicines to break up gallstones. Medicines may be used for 6–12 months.  A procedure to find and take out gallstones or to break up gallstones.  Follow these instructions at home:  Medicines    Take over-the-counter and prescription medicines only as told by your doctor.  If you were prescribed antibiotics, take them as told by your doctor. Do not stop taking them even if you start to feel better.  Ask your doctor if you should avoid driving or using machines while you are taking your medicine.  Eating and drinking    Drink enough fluid to keep your pee pale yellow. Drink water or clear fluids. This is important when you have pain.  Eat healthy foods. Choose:  Fewer fatty foods, such as fried foods.  Fewer refined carbs. Avoid breads and grains that are highly processed, such as white bread and white rice. Choose whole grains, such as whole-wheat bread and brown rice.  More fiber. Almonds, fresh fruit, and beans are healthy sources.  General instructions    Keep a healthy weight.  Keep all follow-up visits. You may need to see a specialist or a surgeon.  Where to find more information  National [place] of Diabetes and Digestive and Kidney Diseases: niddk.nih.gov  Contact a doctor if:  You have sudden pain in the upper right part of your belly. Pain might spread to your right shoulder, back, or chest.  Your pain lasts more than 2 hours.  You have been diagnosed with gallstones that have no symptoms and you get:  Belly pain.  Discomfort, burning, or fullness in the upper part of your abdomen.  You keep feeling like you may vomit.  You have dark pee or pale poop.  Get help right away if:  You have pain in your abdomen, that:  Lasts more than 5 hours.  Keeps getting worse.  You have a fever or chills.  You can't stop vomiting.  Your skin or the white parts of your eyes turn yellow.  This information is not intended to replace advice given to you by your health care provider. Make sure you discuss any questions you have with your health care provider.

## 2025-01-16 NOTE — ED PROVIDER NOTE - OBJECTIVE STATEMENT
54-year-old male with past medical history of hypertension, hyperlipidemia, CAD on aspirin, PVD, diabetes and known gallstones, penile implant, appendectomy who presents for RUQ abdominal pain. Reports he has a "gallbladder attack" since this morning.  States he feels nauseous and had multiple episodes of NBNB emesis.  No associated fevers, chills, chest pain, shortness breath, palpitation, headache, diarrhea, constipation, dysuria, hematuria, weakness, numbness, trauma.  States he has had episodes like this in the past.  Denies alcohol use, substance use, tobacco use.

## 2025-01-16 NOTE — ED PROVIDER NOTE - CARE PROVIDER_API CALL
Kitty Vela  Surgical Critical Care  [address] [place] [address], [address]  [place] [address]  Phone: [phone]  Follow Up Time: Routine

## 2025-01-17 NOTE — CHART NOTE - NSCHARTNOTEFT_GEN_A_CORE
Appt scheduled 1/24/25 9:30AM w/ Dr. Peguero @ [address], 3rd floor (general surgery referral) CT 1/17.

## 2025-01-28 NOTE — ED ADULT NURSE NOTE - NSFALLRSKHARMRISK_ED_ALL_ED
"Called pt to reschedule 1/31/25 appointment to a Thursday per management request. \" Tamy only sees medical patients (ones who haven't had surgery) on a Thursday.\"    LVM.   "
LVM to call back to reschedule.   
no

## 2025-01-29 ENCOUNTER — APPOINTMENT (OUTPATIENT)
Dept: SURGERY | Facility: CLINIC | Age: 55
End: 2025-01-29
Payer: MEDICARE

## 2025-01-29 VITALS
DIASTOLIC BLOOD PRESSURE: 90 MMHG | HEART RATE: 71 BPM | SYSTOLIC BLOOD PRESSURE: 152 MMHG | OXYGEN SATURATION: 99 % | BODY MASS INDEX: 27.75 KG/M2 | HEIGHT: 77 IN | WEIGHT: 235 LBS | TEMPERATURE: 97 F

## 2025-01-29 DIAGNOSIS — K80.20 CALCULUS OF GALLBLADDER W/OUT CHOLECYSTITIS W/OUT OBSTRUCTION: ICD-10-CM

## 2025-01-29 PROCEDURE — 99204 OFFICE O/P NEW MOD 45 MIN: CPT

## 2025-02-04 ENCOUNTER — OUTPATIENT (OUTPATIENT)
Dept: OUTPATIENT SERVICES | Facility: HOSPITAL | Age: 55
LOS: 1 days | End: 2025-02-04

## 2025-02-04 ENCOUNTER — APPOINTMENT (OUTPATIENT)
Dept: PODIATRY | Facility: CLINIC | Age: 55
End: 2025-02-04

## 2025-02-04 DIAGNOSIS — Z98.890 OTHER SPECIFIED POSTPROCEDURAL STATES: Chronic | ICD-10-CM

## 2025-02-04 DIAGNOSIS — Z00.00 ENCOUNTER FOR GENERAL ADULT MEDICAL EXAMINATION WITHOUT ABNORMAL FINDINGS: ICD-10-CM

## 2025-02-04 DIAGNOSIS — Z96.0 PRESENCE OF UROGENITAL IMPLANTS: Chronic | ICD-10-CM

## 2025-02-04 PROCEDURE — 99213 OFFICE O/P EST LOW 20 MIN: CPT

## 2025-02-04 PROCEDURE — G2211 COMPLEX E/M VISIT ADD ON: CPT

## 2025-02-18 ENCOUNTER — OUTPATIENT (OUTPATIENT)
Dept: OUTPATIENT SERVICES | Facility: HOSPITAL | Age: 55
LOS: 1 days | End: 2025-02-18
Payer: MEDICARE

## 2025-02-18 ENCOUNTER — RESULT REVIEW (OUTPATIENT)
Age: 55
End: 2025-02-18

## 2025-02-18 ENCOUNTER — APPOINTMENT (OUTPATIENT)
Dept: PODIATRY | Facility: CLINIC | Age: 55
End: 2025-02-18
Payer: MEDICARE
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
## 2025-02-18 DIAGNOSIS — E11.42 TYPE 2 DIABETES MELLITUS WITH DIABETIC POLYNEUROPATHY: ICD-10-CM

## 2025-02-18 DIAGNOSIS — Z98.890 OTHER SPECIFIED POSTPROCEDURAL STATES: Chronic | ICD-10-CM

## 2025-02-18 DIAGNOSIS — L97.519 NON-PRESSURE CHRONIC ULCER OF OTHER PART OF RIGHT FOOT WITH UNSPECIFIED SEVERITY: ICD-10-CM

## 2025-02-18 DIAGNOSIS — Z96.0 PRESENCE OF UROGENITAL IMPLANTS: Chronic | ICD-10-CM

## 2025-02-18 DIAGNOSIS — L97.529 NON-PRESSURE CHRONIC ULCER OF OTHER PART OF LEFT FOOT WITH UNSPECIFIED SEVERITY: ICD-10-CM
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
## 2025-02-18 DIAGNOSIS — Z00.00 ENCOUNTER FOR GENERAL ADULT MEDICAL EXAMINATION WITHOUT ABNORMAL FINDINGS: ICD-10-CM

## 2025-02-18 PROCEDURE — 11042 DBRDMT SUBQ TIS 1ST 20SQCM/<: CPT

## 2025-02-18 PROCEDURE — 99213 OFFICE O/P EST LOW 20 MIN: CPT | Mod: 25

## 2025-02-18 PROCEDURE — 73630 X-RAY EXAM OF FOOT: CPT | Mod: LT

## 2025-02-18 PROCEDURE — 73630 X-RAY EXAM OF FOOT: CPT | Mod: 26,LT

## 2025-02-19 DIAGNOSIS — L97.529 NON-PRESSURE CHRONIC ULCER OF OTHER PART OF LEFT FOOT WITH UNSPECIFIED SEVERITY: ICD-10-CM

## 2025-02-26 ENCOUNTER — APPOINTMENT (OUTPATIENT)
Dept: SURGERY | Facility: CLINIC | Age: 55
End: 2025-02-26

## 2025-03-03 ENCOUNTER — APPOINTMENT (OUTPATIENT)
Dept: PODIATRY | Facility: CLINIC | Age: 55
End: 2025-03-03

## 2025-03-03 ENCOUNTER — OUTPATIENT (OUTPATIENT)
Dept: OUTPATIENT SERVICES | Facility: HOSPITAL | Age: 55
LOS: 1 days | End: 2025-03-03
Payer: MEDICARE

## 2025-03-03 DIAGNOSIS — L97.529 NON-PRESSURE CHRONIC ULCER OF OTHER PART OF LEFT FOOT WITH UNSPECIFIED SEVERITY: ICD-10-CM

## 2025-03-03 DIAGNOSIS — G89.29 PAIN IN LEFT FOOT: ICD-10-CM

## 2025-03-03 DIAGNOSIS — Z98.890 OTHER SPECIFIED POSTPROCEDURAL STATES: Chronic | ICD-10-CM

## 2025-03-03 DIAGNOSIS — M79.672 PAIN IN LEFT FOOT: ICD-10-CM

## 2025-03-03 DIAGNOSIS — E11.59 TYPE 2 DIABETES MELLITUS WITH OTHER CIRCULATORY COMPLICATIONS: ICD-10-CM

## 2025-03-03 DIAGNOSIS — Z00.00 ENCOUNTER FOR GENERAL ADULT MEDICAL EXAMINATION WITHOUT ABNORMAL FINDINGS: ICD-10-CM

## 2025-03-03 DIAGNOSIS — Z96.0 PRESENCE OF UROGENITAL IMPLANTS: Chronic | ICD-10-CM

## 2025-03-03 DIAGNOSIS — L97.519 NON-PRESSURE CHRONIC ULCER OF OTHER PART OF RIGHT FOOT WITH UNSPECIFIED SEVERITY: ICD-10-CM

## 2025-03-03 PROCEDURE — 99213 OFFICE O/P EST LOW 20 MIN: CPT

## 2025-03-04 ENCOUNTER — NON-APPOINTMENT (OUTPATIENT)
Age: 55
End: 2025-03-04

## 2025-03-10 ENCOUNTER — OUTPATIENT (OUTPATIENT)
Dept: OUTPATIENT SERVICES | Facility: HOSPITAL | Age: 55
LOS: 1 days | End: 2025-03-10
Payer: MEDICARE

## 2025-03-10 ENCOUNTER — RESULT REVIEW (OUTPATIENT)
Age: 55
End: 2025-03-10

## 2025-03-10 DIAGNOSIS — Z98.890 OTHER SPECIFIED POSTPROCEDURAL STATES: Chronic | ICD-10-CM

## 2025-03-10 DIAGNOSIS — L97.529 NON-PRESSURE CHRONIC ULCER OF OTHER PART OF LEFT FOOT WITH UNSPECIFIED SEVERITY: ICD-10-CM

## 2025-03-10 DIAGNOSIS — Z00.8 ENCOUNTER FOR OTHER GENERAL EXAMINATION: ICD-10-CM

## 2025-03-10 DIAGNOSIS — Z96.0 PRESENCE OF UROGENITAL IMPLANTS: Chronic | ICD-10-CM

## 2025-03-10 PROCEDURE — 73720 MRI LWR EXTREMITY W/O&W/DYE: CPT | Mod: LT

## 2025-03-10 PROCEDURE — A9579: CPT

## 2025-03-10 PROCEDURE — 73720 MRI LWR EXTREMITY W/O&W/DYE: CPT | Mod: 26,LT

## 2025-03-11 DIAGNOSIS — L97.529 NON-PRESSURE CHRONIC ULCER OF OTHER PART OF LEFT FOOT WITH UNSPECIFIED SEVERITY: ICD-10-CM

## 2025-03-14 DIAGNOSIS — L97.519 NON-PRESSURE CHRONIC ULCER OF OTHER PART OF RIGHT FOOT WITH UNSPECIFIED SEVERITY: ICD-10-CM
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
## 2025-03-14 DIAGNOSIS — E11.59 TYPE 2 DIABETES MELLITUS WITH OTHER CIRCULATORY COMPLICATIONS: ICD-10-CM
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
## 2025-03-14 DIAGNOSIS — L97.529 NON-PRESSURE CHRONIC ULCER OF OTHER PART OF LEFT FOOT WITH UNSPECIFIED SEVERITY: ICD-10-CM

## 2025-03-14 DIAGNOSIS — M79.672 PAIN IN LEFT FOOT: ICD-10-CM

## 2025-03-27 NOTE — PATIENT PROFILE ADULT - LIVING ENVIRONMENT
No protocol for requested medication.    Medication: Naltrexone-buPROPion HCl ER 8-90 MG TABLET SR 12 HR   Last office visit date: 1/17/25  Pharmacy: [facility] - [facility] [id number]    Order pended, routed to clinician for review.     Next appt 4/30/25   no

## 2025-05-07 ENCOUNTER — OUTPATIENT (OUTPATIENT)
Dept: OUTPATIENT SERVICES | Facility: HOSPITAL | Age: 55
LOS: 1 days | Discharge: HOME | End: 2025-05-07
Payer: MEDICARE

## 2025-05-07 VITALS
SYSTOLIC BLOOD PRESSURE: 168 MMHG | HEART RATE: 90 BPM | DIASTOLIC BLOOD PRESSURE: 102 MMHG | WEIGHT: 229.94 LBS | HEIGHT: 77 IN | RESPIRATION RATE: 16 BRPM | TEMPERATURE: 97 F | OXYGEN SATURATION: 100 %

## 2025-05-07 VITALS — RESPIRATION RATE: 18 BRPM | HEART RATE: 69 BPM | SYSTOLIC BLOOD PRESSURE: 173 MMHG | DIASTOLIC BLOOD PRESSURE: 96 MMHG

## 2025-05-07 DIAGNOSIS — Z98.890 OTHER SPECIFIED POSTPROCEDURAL STATES: Chronic | ICD-10-CM

## 2025-05-07 DIAGNOSIS — Z96.0 PRESENCE OF UROGENITAL IMPLANTS: Chronic | ICD-10-CM

## 2025-05-07 LAB — GLUCOSE BLDC GLUCOMTR-MCNC: 135 MG/DL — HIGH (ref 70–99)

## 2025-05-07 PROCEDURE — 82962 GLUCOSE BLOOD TEST: CPT

## 2025-05-07 PROCEDURE — V2632: CPT

## 2025-05-09 DIAGNOSIS — Z79.84 LONG TERM (CURRENT) USE OF ORAL HYPOGLYCEMIC DRUGS: ICD-10-CM

## 2025-05-09 DIAGNOSIS — I10 ESSENTIAL (PRIMARY) HYPERTENSION: ICD-10-CM

## 2025-05-09 DIAGNOSIS — H25.812 COMBINED FORMS OF AGE-RELATED CATARACT, LEFT EYE: ICD-10-CM

## 2025-05-09 DIAGNOSIS — Z79.82 LONG TERM (CURRENT) USE OF ASPIRIN: ICD-10-CM

## 2025-05-09 DIAGNOSIS — E11.36 TYPE 2 DIABETES MELLITUS WITH DIABETIC CATARACT: ICD-10-CM

## 2025-05-15 ENCOUNTER — OUTPATIENT (OUTPATIENT)
Dept: OUTPATIENT SERVICES | Facility: HOSPITAL | Age: 55
LOS: 1 days | End: 2025-05-15

## 2025-05-15 DIAGNOSIS — Z01.20 ENCOUNTER FOR DENTAL EXAMINATION AND CLEANING WITHOUT ABNORMAL FINDINGS: ICD-10-CM

## 2025-05-15 DIAGNOSIS — Z98.890 OTHER SPECIFIED POSTPROCEDURAL STATES: Chronic | ICD-10-CM

## 2025-05-15 DIAGNOSIS — Z96.0 PRESENCE OF UROGENITAL IMPLANTS: Chronic | ICD-10-CM

## 2025-05-15 PROCEDURE — D0230: CPT

## 2025-05-15 PROCEDURE — D0140: CPT

## 2025-05-15 PROCEDURE — D0274: CPT

## 2025-05-15 PROCEDURE — D1351: CPT

## 2025-05-15 PROCEDURE — D9230: CPT

## 2025-05-15 PROCEDURE — D0330: CPT

## 2025-05-15 PROCEDURE — D2392: CPT

## 2025-05-19 DIAGNOSIS — Z01.21 ENCOUNTER FOR DENTAL EXAMINATION AND CLEANING WITH ABNORMAL FINDINGS: ICD-10-CM

## 2025-05-28 ENCOUNTER — OUTPATIENT (OUTPATIENT)
Dept: OUTPATIENT SERVICES | Facility: HOSPITAL | Age: 55
LOS: 1 days | End: 2025-05-28
Payer: COMMERCIAL

## 2025-05-28 DIAGNOSIS — K02.9 DENTAL CARIES, UNSPECIFIED: ICD-10-CM

## 2025-05-28 DIAGNOSIS — Z96.0 PRESENCE OF UROGENITAL IMPLANTS: Chronic | ICD-10-CM

## 2025-05-28 DIAGNOSIS — Z98.890 OTHER SPECIFIED POSTPROCEDURAL STATES: Chronic | ICD-10-CM

## 2025-05-28 PROCEDURE — D0140: CPT

## 2025-05-30 DIAGNOSIS — K02.9 DENTAL CARIES, UNSPECIFIED: ICD-10-CM

## 2025-06-02 NOTE — ED ADULT NURSE NOTE - CHIEF COMPLAINT QUOTE
Detail Level: Detailed
Add 1585x Cpt? (Do Not Bill If You Billed For The Procedure Placing The Sutures. This Is An Add-On Code That Must Be Billed With An E/M Visit Code): No
rash/ dry skin to right ankle x 3 weeks

## 2025-06-04 ENCOUNTER — OUTPATIENT (OUTPATIENT)
Dept: OUTPATIENT SERVICES | Facility: HOSPITAL | Age: 55
LOS: 1 days | End: 2025-06-04
Payer: MEDICAID

## 2025-06-04 DIAGNOSIS — K01.1 IMPACTED TEETH: ICD-10-CM

## 2025-06-04 DIAGNOSIS — Z98.890 OTHER SPECIFIED POSTPROCEDURAL STATES: Chronic | ICD-10-CM

## 2025-06-04 DIAGNOSIS — Z96.0 PRESENCE OF UROGENITAL IMPLANTS: Chronic | ICD-10-CM

## 2025-06-04 PROCEDURE — D0150: CPT

## 2025-06-05 DIAGNOSIS — K02.9 DENTAL CARIES, UNSPECIFIED: ICD-10-CM

## 2025-06-11 ENCOUNTER — OUTPATIENT (OUTPATIENT)
Dept: OUTPATIENT SERVICES | Facility: HOSPITAL | Age: 55
LOS: 1 days | End: 2025-06-11

## 2025-06-11 DIAGNOSIS — Z98.890 OTHER SPECIFIED POSTPROCEDURAL STATES: Chronic | ICD-10-CM

## 2025-06-11 DIAGNOSIS — K01.1 IMPACTED TEETH: ICD-10-CM

## 2025-06-11 DIAGNOSIS — Z96.0 PRESENCE OF UROGENITAL IMPLANTS: Chronic | ICD-10-CM

## 2025-06-11 PROCEDURE — D7140: CPT

## 2025-06-12 DIAGNOSIS — K02.9 DENTAL CARIES, UNSPECIFIED: ICD-10-CM

## 2025-06-30 ENCOUNTER — OUTPATIENT (OUTPATIENT)
Dept: OUTPATIENT SERVICES | Facility: HOSPITAL | Age: 55
LOS: 1 days | End: 2025-06-30

## 2025-06-30 DIAGNOSIS — Z98.890 OTHER SPECIFIED POSTPROCEDURAL STATES: Chronic | ICD-10-CM

## 2025-06-30 DIAGNOSIS — Z01.20 ENCOUNTER FOR DENTAL EXAMINATION AND CLEANING WITHOUT ABNORMAL FINDINGS: ICD-10-CM

## 2025-06-30 DIAGNOSIS — Z96.0 PRESENCE OF UROGENITAL IMPLANTS: Chronic | ICD-10-CM

## 2025-06-30 PROCEDURE — D1110: CPT

## 2025-07-02 DIAGNOSIS — Z01.21 ENCOUNTER FOR DENTAL EXAMINATION AND CLEANING WITH ABNORMAL FINDINGS: ICD-10-CM

## 2025-07-09 ENCOUNTER — OUTPATIENT (OUTPATIENT)
Dept: OUTPATIENT SERVICES | Facility: HOSPITAL | Age: 55
LOS: 1 days | Discharge: HOME | End: 2025-07-09
Payer: MEDICARE

## 2025-07-09 VITALS
WEIGHT: 229.94 LBS | OXYGEN SATURATION: 100 % | RESPIRATION RATE: 17 BRPM | TEMPERATURE: 99 F | DIASTOLIC BLOOD PRESSURE: 94 MMHG | SYSTOLIC BLOOD PRESSURE: 180 MMHG | HEIGHT: 77 IN | HEART RATE: 70 BPM

## 2025-07-09 VITALS — HEART RATE: 59 BPM | RESPIRATION RATE: 16 BRPM | DIASTOLIC BLOOD PRESSURE: 87 MMHG | SYSTOLIC BLOOD PRESSURE: 154 MMHG

## 2025-07-09 DIAGNOSIS — Z98.890 OTHER SPECIFIED POSTPROCEDURAL STATES: Chronic | ICD-10-CM

## 2025-07-09 DIAGNOSIS — Z96.0 PRESENCE OF UROGENITAL IMPLANTS: Chronic | ICD-10-CM

## 2025-07-09 DIAGNOSIS — H25.11 AGE-RELATED NUCLEAR CATARACT, RIGHT EYE: ICD-10-CM

## 2025-07-09 LAB — GLUCOSE BLDC GLUCOMTR-MCNC: 104 MG/DL — HIGH (ref 70–99)

## 2025-07-09 PROCEDURE — 82962 GLUCOSE BLOOD TEST: CPT

## 2025-07-09 PROCEDURE — V2632: CPT

## 2025-07-09 NOTE — ASU PATIENT PROFILE, ADULT - NSICDXPASTSURGICALHX_GEN_ALL_CORE_FT
PAST SURGICAL HISTORY:  H/O foot surgery toe amputation left second toe, right second toe bone removed    History of eye surgery left iol    History of penile implant     History of surgery b/l gynecomastia surgery, appendectomy

## 2025-07-09 NOTE — ASU PATIENT PROFILE, ADULT - ABILITY TO HEAR (WITH HEARING AID OR HEARING APPLIANCE IF NORMALLY USED):
Date of Service: 05/13/2023    ATTENDING PHYSICIAN:    Tanmay Velasquez MD.    REFERRING PHYSICIAN:    Tanmay Velasquez MD.    ADMITTING DIAGNOSIS:    Fall.    Consult requested for agitation, delirium, needs to avoid IV Haldol and IV Lorazepam.     History was obtained from review of chart, discussion with RN and also discussion with the patient's daughter who is the primary decision maker.    HISTORY OF PRESENT ILLNESS:    A 89-year-old female with a history of dementia, hypertension, COPD, not on home oxygen, scoliosis, spinal stenosis.  She is very hard of hearing, but when I was speaking to her through her left ear she could understand most of what I said to her. She thinks that she is at [facility] right now.  She knows that she is here because of a \"bladder infection.\" She tells me \"it is 04/2023.\" She indicates that she did have a bout of depression a year ago.  She does not recall the details of it though. She states that she has had periods of anxiety and she does not know why and cannot elaborate any further. She tells me that she usually sleeps very well until this last week.  She states this week Tuesday, Wednesday and Friday she could not sleep. She could not explain to me why she could not sleep. She tells me that she has to use the bathroom 5 times.  She indicates that her appetite has been down.  She has a sitter at her bedside and her sitter indicates that she was seeing things and seeing people that were not there.  She had been fidgeting earlier, but at the time that I evaluated her she was just lying in bed, comfortable, calm and pleasant.  She does tell me her appetite has been down.  Her daughter states that she is concerned that the patient may have injured her left hip when she fell.  X-rays were done in the ER and those were negative.  She also is concerned that the patient does not know how to feed herself. When her granddaughters were visiting she had the tray there  and she did not seem to know to eat her meal.  The patient tells me that her appetite is better today.  Her daughter also is concerned that the patient does not know how to use the call light, though she does have staff available to her right now.  There are no issues with suicidality, no homicidality. The patient may have been paranoid at times and she tells me that she currently she feels very safe here and that where she was at last night she did not feel safe.  She currently has Seroquel ordered 50 mg at bedtime. That was started on admission.  She also has Hydroxyzine 10 mg ordered as needed, just ordered today as last night she was very agitated and could not sleep.  She did have an EKG done earlier this morning and it did show premature ventricular complexes.  The corrected QT interval was 526.    SUBSTANCE HISTORY:    She tells me she does not smoke cigarettes.  She rarely uses alcohol.  No illicit drug use.    PAST PSYCHIATRIC HISTORY:    She is on Bupropion for depression, Bupropion  mg daily.    PAST MEDICAL HISTORY:    She has a history of COPD, hypertension, gastroesophageal reflux disease, glaucoma, prediabetic, scoliosis, spinal stenosis.  No history of seizures. No traumatic brain injuries.  She had issues with urinary retention as well during this hospitalization.  She currently has a urinary tract infection.    CURRENT MEDICATIONS:    Tylenol as needed.   Maalox.   Amlodipine.    Atropine as needed.    Brimonidine.  Bupropion.  Dorzolamide eye solution.   Famotidine.  Heparin.  Hydralazine.  Hydroxyzine.   Atrovent inhaler.    Xalatan eye solution.   Losartan.    ALLERGIES:    Aspirin.  Penicillin.   Cefaclor.   Lanolin.   Levaquin.   Sorbitan.  Sulfa.    FAMILY HISTORY:    She tells me that she has 1 sister, 2 daughters and 1 son and there is no psychiatric history the family. The daughter states that is not true.  She only has 2 daughters.  It should be noted that when I asked the patient  how many children she has she did initially tell me that she has 2 children. When I asked her if they are girls or boys she then told me she has 2 daughters and 1 son.    SOCIAL HISTORY:    She tells me she has been  for 12 years and that she lives in [place] and that she does not work.  She is a homemaker.  Her daughter states that she recently had knee surgery so has not been able to come by to visit her but her granddaughter has been by to visit her. The daughter is her healthcare decision maker.    MENTAL STATUS EXAMINATION:   Appearance: A 89-year-old female who is thin, fatigued, lying in bed.  She is pleasant, cooperative with interview.  Psychomotor state:  No abnormalities noted.  Speech is coherent.  She is very hard of hearing though.  Mood: Currently calm.  Affect is euthymic.  Thought process: Goal directed, illogical at times.  Thought content:  No suicidal or homicidal ideations.  Perception:  No current auditory or visual hallucinations except per her caregiver at her bedside earlier in the day she was hallucinating, seeing things, seeing people, 2 men that we are not there.  Concentration is impaired.  Attention is impaired.  Insight and judgment are impaired.    DIAGNOSES:  AXIS I:  Delirium.  History of dementia. History of depression.  AXIS II:  Deferred.  AXIS III:  Urinary tract infection, chronic obstructive pulmonary disease, scoliosis, spinal stenosis, glaucoma, hypertension, abnormal EKG.  AXIS IV: Health issues.  AXIS V:  30-40.    ASSESSMENT:    A 89-year-old female who has a history of dementia per chart, history of depression in the past.  She states that she recalls feeling depressed in the past and she was on Bupropion for depression apparently based on review of chart. She was started on Seroquel during this admission because of some agitation, difficulty sleeping last night, even with her Seroquel she did not sleep.  She was agitated.  She has been hallucinating at times.   She is more confused at times.  She is not suicidal.  She is not homicidal.  She gets restless at times. Appears to have delirium secondary to her urinary tract infection.    RECOMMENDATIONS:  Due to her cardiac status and urinary retention I would advise stopping the Quetiapine and the Hydroxyzine. Instead we will use Lorazepam 0.75 mg at bedtime.  Her respiratory status currently appears to be stable.  We will use an extra 0.5 mg every 6 hours as needed for anxiety or agitation.  I did discuss the risks of worsening memory, falls with the Lorazepam with her daughter and her daughter understands that this is temporary in order to help with her sleep and based on her current other medical status it is the safest option at this time.    Thank you very much for allowing me to participate in the care of this patient.      Dictated By: Jessy Crabtree MD  Signing Provider: MD DANIKA Pantoja/ulises ([id number])   DD: 05/13/2023 2:41:21 PM TD: 05/13/2023 3:13:17 PM       Adequate: hears normal conversation without difficulty

## 2025-07-09 NOTE — ASU PATIENT PROFILE, ADULT - MEDICATIONS BROUGHT TO HOSPITAL, PROFILE
no Quality 226: Preventive Care And Screening: Tobacco Use: Screening And Cessation Intervention: Patient screened for tobacco use and is an ex/non-smoker Detail Level: Detailed Quality 431: Preventive Care And Screening: Unhealthy Alcohol Use - Screening: Patient not identified as an unhealthy alcohol user when screened for unhealthy alcohol use using a systematic screening method Quality 110: Preventive Care And Screening: Influenza Immunization: Influenza immunization was not ordered or administered, reason not given

## 2025-07-09 NOTE — ASU PATIENT PROFILE, ADULT - NSICDXPASTMEDICALHX_GEN_ALL_CORE_FT
PAST MEDICAL HISTORY:  Diabetes     Gallstones     History of medical problems retinopathy, pvd, sciatica, neuropathy    History of penile disorder     Hypercholesteremia     Hypertension

## 2025-07-09 NOTE — ASU PREOP CHECKLIST - ALLERGY BAND ON
[FreeTextEntry1] : currently pt does not have hearing aids reviewed audio 5/24 w profound sn loss retry w aids reviewed w pt daughter option of cochlear implant no known allergies

## 2025-07-09 NOTE — ASU PATIENT PROFILE, ADULT - FALL HARM RISK - HARM RISK INTERVENTIONS

## 2025-07-14 DIAGNOSIS — I10 ESSENTIAL (PRIMARY) HYPERTENSION: ICD-10-CM

## 2025-07-14 DIAGNOSIS — E11.9 TYPE 2 DIABETES MELLITUS WITHOUT COMPLICATIONS: ICD-10-CM

## 2025-07-14 DIAGNOSIS — H25.811 COMBINED FORMS OF AGE-RELATED CATARACT, RIGHT EYE: ICD-10-CM

## 2025-07-14 DIAGNOSIS — Z79.82 LONG TERM (CURRENT) USE OF ASPIRIN: ICD-10-CM

## 2025-07-14 DIAGNOSIS — Z79.84 LONG TERM (CURRENT) USE OF ORAL HYPOGLYCEMIC DRUGS: ICD-10-CM

## 2025-07-16 ENCOUNTER — OUTPATIENT (OUTPATIENT)
Dept: OUTPATIENT SERVICES | Facility: HOSPITAL | Age: 55
LOS: 1 days | End: 2025-07-16
Payer: COMMERCIAL

## 2025-07-16 DIAGNOSIS — Z98.890 OTHER SPECIFIED POSTPROCEDURAL STATES: Chronic | ICD-10-CM

## 2025-07-16 DIAGNOSIS — Z96.0 PRESENCE OF UROGENITAL IMPLANTS: Chronic | ICD-10-CM

## 2025-07-16 DIAGNOSIS — K01.1 IMPACTED TEETH: ICD-10-CM

## 2025-07-16 PROBLEM — E78.00 PURE HYPERCHOLESTEROLEMIA, UNSPECIFIED: Chronic | Status: ACTIVE | Noted: 2025-07-09

## 2025-07-16 PROBLEM — Z87.898 PERSONAL HISTORY OF OTHER SPECIFIED CONDITIONS: Chronic | Status: ACTIVE | Noted: 2025-07-09

## 2025-07-16 PROCEDURE — D2150: CPT

## 2025-07-24 DIAGNOSIS — K02.9 DENTAL CARIES, UNSPECIFIED: ICD-10-CM

## 2025-07-30 ENCOUNTER — APPOINTMENT (OUTPATIENT)
Dept: VASCULAR SURGERY | Facility: CLINIC | Age: 55
End: 2025-07-30
Payer: MEDICARE

## 2025-07-30 ENCOUNTER — APPOINTMENT (OUTPATIENT)
Dept: VASCULAR SURGERY | Facility: CLINIC | Age: 55
End: 2025-07-30

## 2025-07-30 VITALS — DIASTOLIC BLOOD PRESSURE: 86 MMHG | SYSTOLIC BLOOD PRESSURE: 149 MMHG

## 2025-07-30 VITALS — HEIGHT: 77 IN | BODY MASS INDEX: 27.16 KG/M2 | WEIGHT: 230 LBS

## 2025-07-30 DIAGNOSIS — I70.245 ATHEROSCLEROSIS OF NATIVE ARTERIES OF LEFT LEG WITH ULCERATION OF OTHER PART OF FOOT: ICD-10-CM

## 2025-07-30 PROCEDURE — 99213 OFFICE O/P EST LOW 20 MIN: CPT

## 2025-07-30 PROCEDURE — 93925 LOWER EXTREMITY STUDY: CPT
